# Patient Record
Sex: MALE | Race: WHITE | NOT HISPANIC OR LATINO | Employment: FULL TIME | URBAN - METROPOLITAN AREA
[De-identification: names, ages, dates, MRNs, and addresses within clinical notes are randomized per-mention and may not be internally consistent; named-entity substitution may affect disease eponyms.]

---

## 2017-01-24 ENCOUNTER — ALLSCRIPTS OFFICE VISIT (OUTPATIENT)
Dept: OTHER | Facility: OTHER | Age: 42
End: 2017-01-24

## 2017-01-30 ENCOUNTER — GENERIC CONVERSION - ENCOUNTER (OUTPATIENT)
Dept: OTHER | Facility: OTHER | Age: 42
End: 2017-01-30

## 2017-03-13 ENCOUNTER — ALLSCRIPTS OFFICE VISIT (OUTPATIENT)
Dept: OTHER | Facility: OTHER | Age: 42
End: 2017-03-13

## 2017-03-13 LAB — S PYO AG THROAT QL: NEGATIVE

## 2017-05-11 ENCOUNTER — ALLSCRIPTS OFFICE VISIT (OUTPATIENT)
Dept: OTHER | Facility: OTHER | Age: 42
End: 2017-05-11

## 2017-05-15 ENCOUNTER — ALLSCRIPTS OFFICE VISIT (OUTPATIENT)
Dept: OTHER | Facility: OTHER | Age: 42
End: 2017-05-15

## 2017-05-31 ENCOUNTER — GENERIC CONVERSION - ENCOUNTER (OUTPATIENT)
Dept: OTHER | Facility: OTHER | Age: 42
End: 2017-05-31

## 2017-05-31 LAB
A/G RATIO (HISTORICAL): 1.8 (CALC) (ref 1–2.5)
ALBUMIN SERPL BCP-MCNC: 4.4 G/DL (ref 3.6–5.1)
ALP SERPL-CCNC: 94 U/L (ref 40–115)
ALT SERPL W P-5'-P-CCNC: 12 U/L (ref 9–46)
AST SERPL W P-5'-P-CCNC: 15 U/L (ref 10–40)
BASOPHILS # BLD AUTO: 0.4 %
BASOPHILS # BLD AUTO: 32 CELLS/UL (ref 0–200)
BILIRUB SERPL-MCNC: 0.5 MG/DL (ref 0.2–1.2)
BUN SERPL-MCNC: 19 MG/DL (ref 7–25)
BUN/CREA RATIO (HISTORICAL): ABNORMAL (CALC) (ref 6–22)
CALCIUM (ADJUSTED FOR ALBUMIN) (HISTORICAL): 9.2 MG/DL (CALC) (ref 8.6–10.2)
CALCIUM SERPL-MCNC: 9.2 MG/DL (ref 8.6–10.3)
CHLORIDE SERPL-SCNC: 105 MMOL/L (ref 98–110)
CHOLEST SERPL-MCNC: 218 MG/DL (ref 125–200)
CHOLEST/HDLC SERPL: 5.1 (CALC)
CO2 SERPL-SCNC: 32 MMOL/L (ref 20–31)
CREAT SERPL-MCNC: 0.99 MG/DL (ref 0.6–1.35)
DEPRECATED RDW RBC AUTO: 13.6 % (ref 11–15)
EGFR AFRICAN AMERICAN (HISTORICAL): 108 ML/MIN/1.73M2
EGFR-AMERICAN CALC (HISTORICAL): 94 ML/MIN/1.73M2
EOSINOPHIL # BLD AUTO: 332 CELLS/UL (ref 15–500)
EOSINOPHIL # BLD AUTO: 4.1 %
GAMMA GLOBULIN (HISTORICAL): 2.4 G/DL (CALC) (ref 1.9–3.7)
GLUCOSE (HISTORICAL): 90 MG/DL (ref 65–99)
HCT VFR BLD AUTO: 46.4 % (ref 38.5–50)
HDLC SERPL-MCNC: 43 MG/DL
HGB BLD-MCNC: 15.4 G/DL (ref 13.2–17.1)
LDL CHOLESTEROL (HISTORICAL): 117 MG/DL (CALC)
LYMPHOCYTES # BLD AUTO: 2106 CELLS/UL (ref 850–3900)
LYMPHOCYTES # BLD AUTO: 26 %
MCH RBC QN AUTO: 30.5 PG (ref 27–33)
MCHC RBC AUTO-ENTMCNC: 33.2 G/DL (ref 32–36)
MCV RBC AUTO: 91.8 FL (ref 80–100)
MONOCYTES # BLD AUTO: 575 CELLS/UL (ref 200–950)
MONOCYTES (HISTORICAL): 7.1 %
NEUTROPHILS # BLD AUTO: 5054 CELLS/UL (ref 1500–7800)
NEUTROPHILS # BLD AUTO: 62.4 %
NON-HDL-CHOL (CHOL-HDL) (HISTORICAL): 175 MG/DL (CALC)
PLATELET # BLD AUTO: 208 THOUSAND/UL (ref 140–400)
PMV BLD AUTO: 11.3 FL (ref 7.5–12.5)
POTASSIUM SERPL-SCNC: 4.9 MMOL/L (ref 3.5–5.3)
RBC # BLD AUTO: 5.06 MILLION/UL (ref 4.2–5.8)
SODIUM SERPL-SCNC: 142 MMOL/L (ref 135–146)
TOTAL PROTEIN (HISTORICAL): 6.8 G/DL (ref 6.1–8.1)
TRIGL SERPL-MCNC: 291 MG/DL
WBC # BLD AUTO: 8.1 THOUSAND/UL (ref 3.8–10.8)

## 2017-09-11 ENCOUNTER — CLINICAL SUPPORT (OUTPATIENT)
Dept: OTHER | Facility: OTHER | Age: 42
End: 2017-09-11

## 2017-09-13 ENCOUNTER — GENERIC CONVERSION - ENCOUNTER (OUTPATIENT)
Dept: OTHER | Facility: OTHER | Age: 42
End: 2017-09-13

## 2017-09-20 ENCOUNTER — ALLSCRIPTS OFFICE VISIT (OUTPATIENT)
Dept: RADIOLOGY | Facility: CLINIC | Age: 42
End: 2017-09-20
Payer: COMMERCIAL

## 2017-09-22 ENCOUNTER — GENERIC CONVERSION - ENCOUNTER (OUTPATIENT)
Dept: OTHER | Facility: OTHER | Age: 42
End: 2017-09-22

## 2017-10-11 ENCOUNTER — GENERIC CONVERSION - ENCOUNTER (OUTPATIENT)
Dept: OTHER | Facility: OTHER | Age: 42
End: 2017-10-11

## 2017-10-11 DIAGNOSIS — M54.2 CERVICALGIA: ICD-10-CM

## 2017-10-11 DIAGNOSIS — M48.02 SPINAL STENOSIS OF CERVICAL REGION: ICD-10-CM

## 2017-10-30 ENCOUNTER — APPOINTMENT (OUTPATIENT)
Dept: PHYSICAL THERAPY | Facility: REHABILITATION | Age: 42
End: 2017-10-30
Payer: COMMERCIAL

## 2017-10-30 DIAGNOSIS — M54.2 CERVICALGIA: ICD-10-CM

## 2017-10-30 DIAGNOSIS — M48.02 SPINAL STENOSIS OF CERVICAL REGION: ICD-10-CM

## 2017-10-30 PROCEDURE — G8994 SUB PT/OT GOAL STATUS: HCPCS

## 2017-10-30 PROCEDURE — 97161 PT EVAL LOW COMPLEX 20 MIN: CPT

## 2017-10-30 PROCEDURE — 97140 MANUAL THERAPY 1/> REGIONS: CPT

## 2017-10-30 PROCEDURE — G8993 SUB PT/OT CURRENT STATUS: HCPCS

## 2017-11-02 ENCOUNTER — APPOINTMENT (OUTPATIENT)
Dept: PHYSICAL THERAPY | Facility: OTHER | Age: 42
End: 2017-11-02
Payer: COMMERCIAL

## 2017-11-02 PROCEDURE — 97112 NEUROMUSCULAR REEDUCATION: CPT

## 2017-11-02 PROCEDURE — 97110 THERAPEUTIC EXERCISES: CPT

## 2017-11-02 PROCEDURE — 97140 MANUAL THERAPY 1/> REGIONS: CPT

## 2017-11-06 ENCOUNTER — APPOINTMENT (OUTPATIENT)
Dept: PHYSICAL THERAPY | Facility: REHABILITATION | Age: 42
End: 2017-11-06
Payer: COMMERCIAL

## 2017-11-06 PROCEDURE — 97112 NEUROMUSCULAR REEDUCATION: CPT

## 2017-11-06 PROCEDURE — 97110 THERAPEUTIC EXERCISES: CPT

## 2017-11-06 PROCEDURE — 97140 MANUAL THERAPY 1/> REGIONS: CPT

## 2017-11-06 PROCEDURE — 97012 MECHANICAL TRACTION THERAPY: CPT

## 2017-11-09 ENCOUNTER — APPOINTMENT (OUTPATIENT)
Dept: PHYSICAL THERAPY | Facility: OTHER | Age: 42
End: 2017-11-09
Payer: COMMERCIAL

## 2017-11-09 PROCEDURE — 97140 MANUAL THERAPY 1/> REGIONS: CPT

## 2017-11-09 PROCEDURE — 97110 THERAPEUTIC EXERCISES: CPT

## 2017-11-09 PROCEDURE — 97112 NEUROMUSCULAR REEDUCATION: CPT

## 2017-11-13 ENCOUNTER — APPOINTMENT (OUTPATIENT)
Dept: PHYSICAL THERAPY | Facility: REHABILITATION | Age: 42
End: 2017-11-13
Payer: COMMERCIAL

## 2017-11-13 PROCEDURE — 97140 MANUAL THERAPY 1/> REGIONS: CPT

## 2017-11-16 ENCOUNTER — APPOINTMENT (OUTPATIENT)
Dept: PHYSICAL THERAPY | Facility: OTHER | Age: 42
End: 2017-11-16
Payer: COMMERCIAL

## 2017-11-16 PROCEDURE — 97112 NEUROMUSCULAR REEDUCATION: CPT

## 2017-11-16 PROCEDURE — 97140 MANUAL THERAPY 1/> REGIONS: CPT

## 2017-11-16 PROCEDURE — 97110 THERAPEUTIC EXERCISES: CPT

## 2017-11-20 ENCOUNTER — APPOINTMENT (OUTPATIENT)
Dept: PHYSICAL THERAPY | Facility: OTHER | Age: 42
End: 2017-11-20
Payer: COMMERCIAL

## 2017-11-27 ENCOUNTER — APPOINTMENT (OUTPATIENT)
Dept: PHYSICAL THERAPY | Facility: REHABILITATION | Age: 42
End: 2017-11-27
Payer: COMMERCIAL

## 2017-11-27 PROCEDURE — 97140 MANUAL THERAPY 1/> REGIONS: CPT

## 2017-11-27 PROCEDURE — 97110 THERAPEUTIC EXERCISES: CPT

## 2017-11-30 ENCOUNTER — APPOINTMENT (OUTPATIENT)
Dept: PHYSICAL THERAPY | Facility: OTHER | Age: 42
End: 2017-11-30
Payer: COMMERCIAL

## 2017-11-30 PROCEDURE — 97112 NEUROMUSCULAR REEDUCATION: CPT

## 2017-11-30 PROCEDURE — 97110 THERAPEUTIC EXERCISES: CPT

## 2017-11-30 PROCEDURE — 97140 MANUAL THERAPY 1/> REGIONS: CPT

## 2017-12-11 ENCOUNTER — ALLSCRIPTS OFFICE VISIT (OUTPATIENT)
Dept: OTHER | Facility: OTHER | Age: 42
End: 2017-12-11

## 2017-12-14 ENCOUNTER — APPOINTMENT (OUTPATIENT)
Dept: PHYSICAL THERAPY | Facility: REHABILITATION | Age: 42
End: 2017-12-14
Payer: COMMERCIAL

## 2017-12-18 ENCOUNTER — APPOINTMENT (OUTPATIENT)
Dept: PHYSICAL THERAPY | Facility: REHABILITATION | Age: 42
End: 2017-12-18
Payer: COMMERCIAL

## 2017-12-18 PROCEDURE — 97110 THERAPEUTIC EXERCISES: CPT

## 2017-12-18 PROCEDURE — 97140 MANUAL THERAPY 1/> REGIONS: CPT

## 2017-12-21 ENCOUNTER — APPOINTMENT (OUTPATIENT)
Dept: PHYSICAL THERAPY | Facility: OTHER | Age: 42
End: 2017-12-21
Payer: COMMERCIAL

## 2018-01-08 ENCOUNTER — APPOINTMENT (OUTPATIENT)
Dept: PHYSICAL THERAPY | Facility: REHABILITATION | Age: 43
End: 2018-01-08
Payer: COMMERCIAL

## 2018-01-10 NOTE — MISCELLANEOUS
Message   Recorded as Task   Date: 09/12/2017 10:16 AM, Created By: Jennifer Jackson   Task Name: Call Back   Assigned To: [facility] sched,Team   Regarding Patient: Trevor Morillo, Status: In Progress   Comment:    Clair Patino - 12 Sep 2017 10:16 AM     TASK CREATED  [facility] Call center- Bran Lozano from [facility] called stating that Rx for Storm Reasoner is not covered under patients insurance   also stated that the Pharmacy is non par w/the patients insurance  Stated that there is a generic brand chlorzoxazon that is covered by the patients insurance  Also stated -[phone] would have to fill the Rx   any questions [phone]   Ophelia Spring - 12 Sep 2017 10:20 AM     TASK EDITED  Can you please reorder generic to new pharmacy? Thanks! Zeke Maribel - 12 Sep 2017 3:28 PM     TASK REPLIED TO: Previously Assigned To Zeke López                      Prescription sent to pharmacy   Leslie Bowling - 12 Sep 2017 3:38 PM     TASK EDITED  [facility] on home/cell # for pt to C/B, C/B # and office hrs provided  Clair Patino - 13 Sep 2017 2:06 PM     TASK EDITED  [address] [facility] Call center- patient returned call   c/b [phone]   Christine Corona - 13 Sep 2017 3:16 PM     TASK EDITED  s/w pt, advised of above  Pt verbalized understanding and appreciation  Pt states he does not think he will have a  for his 9:47 pm appt on 9/20  Requesting a sooner appt time  Advised pt, will forward this request to the   Expect a cb  Pt verbalized understanding and appreciation  Erick Wagner - 13 Sep 2017 3:58 PM     TASK IN PROGRESS   Erick Wagner - 13 Sep 2017 3:58 PM     TASK EDITED  Called pt, rescheduled pt for earlier appt in day, now arriving at 8:45am         Active Problems    1  Cervical radiculopathy (723 4) (M54 12)   2  Cervical stenosis of spine (723 0) (M48 02)   3  Cervicalgia (723 1) (M54 2)   4  Chronic bilateral low back pain without sciatica (724 2,338 29) (M54 5,G89 29)   5   Chronic right-sided thoracic back pain (724 1,338 29) (M54 6,G89 29)   6  Cigarette nicotine dependence without complication (352 7) (X15 976)   7  Environmental and seasonal allergies (477 8) (J30 89)   8  GERD without esophagitis (530 81) (K21 9)   9  Hyperlipidemia (272 4) (E78 5)   10  Mild hypercholesterolemia (272 0) (E78 00)   11  Myofascial pain syndrome (729 1) (M79 1)   12  Need for diphtheria-tetanus-pertussis (Tdap) vaccine, adult/adolescent (V06 1) (Z23)   13  Need for influenza vaccination (V04 81) (Z23)   14  Screening cholesterol level (V77 91) (Z13 220)   15  Seasonal allergies (477 9) (J30 2)   16  Sinus headache (784 0) (R51)    Current Meds   1  Chlorzoxazone 500 MG Oral Tablet; TAKE 1 TABLET EVERY 6 TO 8 HOURS AS   NEEDED; Therapy: 74Nep5437 to (Evaluate:96Yjw1752)  Requested for: 27Xho4700; Last   Rx:96Ryl4094 Ordered   2  Diclofenac Potassium 50 MG Oral Tablet; Take 1 tablet bid with meals when necessary; Therapy: 06WDF3512 to (Evaluate:89Yrf5024)  Requested for: 85Qgq5658; Last   Rx:86Fvh0206 Ordered   3  Pantoprazole Sodium 40 MG Oral Tablet Delayed Release; take 1 tablet by mouth once   daily; Therapy: 23Miu9379 to (Evaluate:78Bsy1689)  Requested for: 47OTG6277; Last   Rx:07Jfe4177 Ordered   4  ZyrTEC Allergy 10 MG Oral Tablet; take 1 tablet daily prn; Therapy: 35KHM8078 to (Evaluate:08Cot5149); Last Rx:40Vwm1065 Ordered    Allergies    1   No Known Drug Allergies    Signatures   Electronically signed by : Shahana Duarte, ; Sep 13 2017  3:58PM EST                       (Author)

## 2018-01-10 NOTE — MISCELLANEOUS
Message   Recorded as Task   Date: 07/21/2016 04:25 PM, Created By: Wes Morales   Task Name: Follow Up   Assigned To: [facility],Team   Regarding Patient: Irvin Hutton, Status: In Progress   Comment:    Brad Waite - 21 Jul 2016 4:25 PM     TASK CREATED  Please inform patient of his xray results of thoracic spine  Unremarkable thoracic spine   Danish Jo - 22 Jul 2016 9:24 AM     TASK EDITED  Attempted to contact pt  LMOM informing pt of above  Advised pt to call back with any further questions/concern  Ok to leave message on machine per release of info consent  Active Problems    1  Cervicalgia (723 1) (M54 2)   2  Chronic bilateral low back pain without sciatica (724 2,338 29) (M54 5,G89 29)   3  Chronic right-sided thoracic back pain (724 1,338 29) (M54 6,G89 29)   4  Cigarette nicotine dependence without complication (398 9) (E03 501)   5  GERD without esophagitis (530 81) (K21 9)   6  Mild hypercholesterolemia (272 0) (E78 0)   7  Myofascial pain syndrome (729 1) (M79 1)   8  Screening cholesterol level (V77 91) (Z13 220)   9  Seasonal allergies (477 9) (J30 2)    Current Meds   1  Nasacort Allergy 24HR 55 MCG/ACT Nasal Aerosol; 2 SPRAYS IN EACH NOSTRIL   ONCE DAILY PRN; Therapy: 93Ulu0811 to Recorded   2  Pantoprazole Sodium 40 MG Oral Tablet Delayed Release; take 1 tablet by mouth once   daily; Therapy: 10Gye4573 to (Tuyet Side)  Requested for: 17UZF5715; Last   Rx:67Exj7745 Ordered    Allergies    1   No Known Drug Allergies    Signatures   Electronically signed by : Judy Martinez, ; Jul 22 2016  9:25AM EST                       (Author)

## 2018-01-11 ENCOUNTER — APPOINTMENT (OUTPATIENT)
Dept: PHYSICAL THERAPY | Facility: REHABILITATION | Age: 43
End: 2018-01-11
Payer: COMMERCIAL

## 2018-01-11 PROCEDURE — 97140 MANUAL THERAPY 1/> REGIONS: CPT

## 2018-01-11 PROCEDURE — 97112 NEUROMUSCULAR REEDUCATION: CPT

## 2018-01-11 PROCEDURE — 97110 THERAPEUTIC EXERCISES: CPT

## 2018-01-11 NOTE — RESULT NOTES
Message   Recorded as Task   Date: 09/22/2017 12:49 PM, Created By: System   Task Name: Financial Auth   Assigned To: Bushra Degree   Regarding Patient: Glenda Bain, Status: In Progress   Comment:    System - 22 Sep 2017 12:49 PM     MRI CERVICAL SPINE WO CONTRAST requires Financial Whitney Stai - 28 Sep 5096 2:89 AM     TASK EDITED  Patient request Reji Maximo - 28 Sep 5226 7:80 AM     TASK EDITED  Mailed script to patient   [facility] - 29 Sep 8467 67:72 AM     TASK EDITED  Submitted request through Rad MD - Pending ref # [id number] - Clinicals faxed     Shannon Carcamo - 24 Oct 2017 8:29 AM     TASK EDITED  Addressed in different task  -MRI not approved unless pt has had PT or Chiro  -PT script mailed to pt

## 2018-01-11 NOTE — RESULT NOTES
Verified Results  (Q) CBC (INCLUDES DIFF/PLT) (REFL) [id number] 11:23AM Too Izaguirre   REPORT COMMENT:  REQ ON HOLD  FASTING:YES AN UPDATE OR CORRECTION HAS BEEN MADE TO NAME     Test Name Result Flag Reference   WHITE BLOOD CELL COUNT 8 1 Thousand/uL  3 8-10 8   RED BLOOD CELL COUNT 5 06 Million/uL  4 20-5 80   HEMOGLOBIN 15 4 g/dL  13 2-17 1   HEMATOCRIT 46 4 %  38 5-50 0   MCV 91 8 fL  80 0-100 0   MCH 30 5 pg  27 0-33 0   MCHC 33 2 g/dL  32 0-36 0   RDW 13 6 %  11 0-15 0   PLATELET COUNT 973 Thousand/uL  140-400   MPV 11 3 fL  7 5-12 5   ABSOLUTE NEUTROPHILS 5054 cells/uL  9454-5244   ABSOLUTE LYMPHOCYTES 2106 cells/uL  850-3900   ABSOLUTE MONOCYTES 575 cells/uL  200-950   ABSOLUTE EOSINOPHILS 332 cells/uL     ABSOLUTE BASOPHILS 32 cells/uL  0-200   NEUTROPHILS 62 4 %     LYMPHOCYTES 26 0 %     MONOCYTES 7 1 %     EOSINOPHILS 4 1 %     BASOPHILS 0 4 %       (Q) COMPREHENSIVE METABOLIC PNL W/ADJUSTED CALCIUM 77KTR3400 11:23AM Too Izaguirre     Test Name Result Flag Reference   GLUCOSE 90 mg/dL  65-99   Fasting reference interval   UREA NITROGEN (BUN) 19 mg/dL  7-25   CREATININE 0 99 mg/dL  0 60-1 35   eGFR NON-AFR   AMERICAN 94 mL/min/1 73m2  > OR = 60   eGFR AFRICAN AMERICAN 108 mL/min/1 73m2  > OR = 60   BUN/CREATININE RATIO   3-96   NOT APPLICABLE (calc)   SODIUM 142 mmol/L  135-146   POTASSIUM 4 9 mmol/L  3 5-5 3   CHLORIDE 105 mmol/L     CARBON DIOXIDE 32 mmol/L H 20-31   CALCIUM 9 2 mg/dL  8 6-10 3   CALCIUM (ADJUSTED FOR$ALBUMIN) 9 2 mg/dL (calc)  8 6-10 2   PROTEIN, TOTAL 6 8 g/dL  6 1-8 1   ALBUMIN 4 4 g/dL  3 6-5 1   GLOBULIN 2 4 g/dL (calc)  1 9-3 7   ALBUMIN/GLOBULIN RATIO 1 8 (calc)  1 0-2 5   BILIRUBIN, TOTAL 0 5 mg/dL  0 2-1 2   ALKALINE PHOSPHATASE 94 U/L     AST 15 U/L  10-40   ALT 12 U/L  9-46     (1) LIPID PANEL, FASTING 61IWG8294 11:23AM Too Izaguirre     Test Name Result Flag Reference   CHOLESTEROL, TOTAL 218 mg/dL H 125-200   HDL CHOLESTEROL 43 mg/dL  > OR = 40   TRIGLICERIDES 882 mg/dL H <150   LDL-CHOLESTEROL 117 mg/dL (calc)  <130   Desirable range <100 mg/dL for patients with CHD or  diabetes and <70 mg/dL for diabetic patients with  known heart disease  CHOL/HDLC RATIO 5 1 (calc) H < OR = 5 0   NON HDL CHOLESTEROL 175 mg/dL (calc) H    Target for non-HDL cholesterol is 30 mg/dL higher than   LDL cholesterol target  Plan  Hyperlipidemia    · (1) LIPID PANEL, FASTING; Status:Active;  Requested for:27Nov2017;

## 2018-01-12 VITALS
WEIGHT: 171.13 LBS | HEIGHT: 69 IN | TEMPERATURE: 97.5 F | HEART RATE: 78 BPM | RESPIRATION RATE: 16 BRPM | BODY MASS INDEX: 25.35 KG/M2 | SYSTOLIC BLOOD PRESSURE: 122 MMHG | DIASTOLIC BLOOD PRESSURE: 76 MMHG

## 2018-01-12 NOTE — PROGRESS NOTES
Assessment    1  Myofascial pain syndrome (729 1) (M79 1)   2  Cervicalgia (723 1) (M54 2)   3  Cervical stenosis of spine (723 0) (M48 02)   4  Cervical radiculopathy (723 4) (M54 12)    Plan  Cervical radiculopathy    · Diclofenac Potassium 50 MG Oral Tablet; Take 1 tablet bid with meals when  necessary   Rx By: Keily Gordon; Dispense: 30 Days ; #:60 Tablet; Refill: 2; For: Cervical radiculopathy; KELVIN = N; Verified Transmission to [facility]ARMACY #[id number] Last Updated By: System, [employer]; 9/11/2017 2:33:00 PM  Cervical radiculopathy, Cervical stenosis of spine, Cervicalgia    · Injection Diagnostic/Therapeutic Not Neurolytic Epidural/Subarachnoid Cervical or  Thoracic; Status:Complete;   Done: 96OAX6443 02:24PM   Perform:[facility]; Order Comments:SHEILA with Dr Jennifer Hernandez; Due:64Vkc7278; Ordered; For:Cervical radiculopathy, Cervical stenosis of spine, Cervicalgia; Ordered By:Mariaa Adame;   · 1 Shelly Padilla MD, Yary Pinzon  (Neurosurgery) Co-Management  *please evaluate this  patient for neck pain with left upper extremity radiculopathy  Status: Canceled   Ordered; For: Cervical radiculopathy, Cervical stenosis of spine, Cervicalgia; Ordered By: Keily Gordon Performed:  Due: 10NSA8696  Care Summary provided  : Yes  Cervical stenosis of spine, Cervicalgia    · 1 - Juan Francisco Moreira MD, [facility] CIN[facility] Neurosurgery Co-Management  *Please evaluate this patient for  possible surgery neck pain with left upper extremity radiculopathy  Status: Active   Requested for: 29Wwt3281   Ordered; For: Cervical stenosis of spine, Cervicalgia; Ordered By: Keily Gordon Performed:  Due: 85PPK9842  Care Summary provided  : Yes      This is a 50-year-old male patient presents the office for follow-up visit today  The patient is currently being treated for myofascial pain syndrome, cervicalgia, cervical radiculopathy and cervical stenosis of the spine   The patient is status post cervical epidural steroid injection #2 on 12/7/17 by Dr Jennifer Hernandez and reports that he had approximately 7 months of pain relief from this injection  The patient reports that the first injection was longer lasting than the second and states that he would like to repeat this injection at this time, however, he ended up having to pay $1300 out of pocket for his second injection because his insurance did not cover it  The patient reports that he would also like a surgical consultation at this time to be evaluated for any kind of surgical intervention may relieve his cervicalgia and cervical radiculopathy in the left upper extremity  The patient's radicular symptoms into his left upper extremity present in no specific dermatomal fashion  He also reports that his neck pain will radiate into his bilateral trapezii muscles  The patient reports that he was taking diclofenac 50 mg twice a day when necessary for his pain symptoms, however, he has not needed this medication because he had such significant pain relief from this injection  The patient reports that he did use the Lorzone 750 mg 3 times a day when necessary samples for his myofascial pain and muscle spasm and reports that this medication was extremely effective in reducing his myofascial pain  The plan for this patient is as follows:  1  I will order this patient a repeat cervical epidural steroid injection to address the inflammatory component of this patient's pain symptoms  I encouraged this patient to contact his insurance company with the procedure codes to ensure that this will be covered by his insurance plan  Complete risks and benefits including bleeding, infection, tissue reaction, nerve injury and allergic reaction were discussed  The approach was demonstrated using models and literature was provided  Verbal and written consent was obtained      2  I will renew this patient's diclofenac 50 mg twice a day when necessary for his pain symptoms and encourage this patient take this medication with food and to avoid all other NSAIDs on this medication  3  I will order this patient Lorzone 750 mg 3 times a day when necessary for his myofascial pain and muscle spasm  I did provide this patient with some samples while he was in the office today as well  4  I will provide this patient with a neurosurgery referral to speak with Dr Benigno Redding regarding his cervicalgia and left upper extremity cervical radiculopathy  5  The patient should continue with his home exercise program     6  I will follow-up with this patient in 8 weeks  Discussion/Summary  Patient is able to Self-Care  The treatment plan was reviewed with the patient/guardian  The patient/guardian understands and agrees with the treatment plan   The patient was counseled regarding instructions for management, risk factor reductions, prognosis, patient and family education, impressions, risks and benefits of treatment options and importance of compliance with treatment  total time of encounter was 25 minutes  Chief Complaint    1  Back Pain  Neck and left arm pain  Right shoulder pain  History of Present Illness  This is a 65-year-old male patient presents the office for follow-up visit today  The patient is currently being treated for myofascial pain syndrome, cervicalgia, cervical radiculopathy and cervical stenosis of the spine  The patient is status post cervical epidural steroid injection #2 on 12/7/17 by Dr Deb Lemos and reports that he had approximately 7 months of pain relief from this injection  The patient reports that the first injection was longer lasting than the second and states that he would like to repeat this injection at this time, however, he ended up having to pay $1300 out of pocket for his second injection because his insurance did not cover it   The patient reports that he would also like a surgical consultation at this time to be evaluated for any kind of surgical intervention may relieve his cervicalgia and cervical radiculopathy in the left upper extremity  The patient's radicular symptoms into his left upper extremity present in no specific dermatomal fashion  He also reports that his neck pain will radiate into his bilateral trapezii muscles  The patient reports that he was taking diclofenac 50 mg twice a day when necessary for his pain symptoms, however, he has not needed this medication because he had such significant pain relief from this injection  The patient reports that he did use the Lorzone 750 mg 3 times a day when necessary samples for his myofascial pain and muscle spasm and reports that this medication was extremely effective in reducing his myofascial pain  The patient reports that his pain is worse since his previous visit and rates his pain a 4-5/10 on the numerical pain rating scale  The patient reports that his pain when he is done working at his job at [employer] is a 10/10 on the numerical pain rating scale  The patient reports that his pain is worse in the evening and night and that his pain is constant and describes the pain as dull aching and sharp-like in nature  The patient reports that the amount of pain relief that he is obtaining now with this current medication regimen treatment plan is not enough to make a real difference in his life  The patient reports that 20% of his pain is being relieved with his current medication regimen treatment plan  He denies any bowel or bladder incontinence or saddle anesthesia  I have personally reviewed and/or updated the patient's past medical history, past surgical history, family history, social history, allergies, and vital signs today  Other than as stated above, the patient denies any interval changes in medications, medical condition, mental condition, symptoms, or allergies since last office visit  Gabrielle Cheema presents with complaints of constant episodes of bilateral upper back pain, described as sharp, dull and aching, radiating to the bilateral shoulder        Review of Systems    Constitutional: no fever, no recent weight gain and no recent weight loss  Eyes: no double vision and no blurry vision  Cardiovascular: no chest pain, no palpitations and no lower extremity edema  Respiratory: no complaints of shortness of breath and no wheezing  Musculoskeletal: pain in extremity , but no difficulty walking, no muscle weakness, no joint stiffness, no joint swelling, no limb swelling` and no decreased range of motion  Neurological: no dizziness, no difficulty swallowing, no memory loss, no loss of consciousness and no seizures  Gastrointestinal: no nausea, no vomiting, no constipation and no diarrhea  Genitourinary: no difficulty initiating urine stream, no genital pain and no frequent urination  Integumentary: no complaints of skin rash  Psychiatric: no depression  Endocrine: no excessive thirst, no adrenal disease, no hypothyroidism and no hyperthyroidism  Hematologic/Lymphatic: no tendency for easy bruising and no tendency for easy bleeding  ROS reviewed  Active Problems    1  Cervical radiculopathy (723 4) (M54 12)   2  Cervical stenosis of spine (723 0) (M48 02)   3  Cervicalgia (723 1) (M54 2)   4  Chronic bilateral low back pain without sciatica (724 2,338 29) (M54 5,G89 29)   5  Chronic right-sided thoracic back pain (724 1,338 29) (M54 6,G89 29)   6  Cigarette nicotine dependence without complication (554 9) (J04 846)   7  Environmental and seasonal allergies (477 8) (J30 89)   8  GERD without esophagitis (530 81) (K21 9)   9  Hyperlipidemia (272 4) (E78 5)   10  Mild hypercholesterolemia (272 0) (E78 00)   11  Myofascial pain syndrome (729 1) (M79 1)   12  Need for diphtheria-tetanus-pertussis (Tdap) vaccine, adult/adolescent (V06 1) (Z23)   13  Need for influenza vaccination (V04 81) (Z23)   14  Screening cholesterol level (V77 91) (Z13 220)   15  Seasonal allergies (477 9) (J30 2)   16  Sinus headache (784 0) (R51)    Past Medical History    1  History of Acute pharyngitis, unspecified etiology (462) (J02 9)   2  History of Acute URI (465 9) (J06 9)   3  History of bacterial conjunctivitis (V12 49) (Z86 69)   4  History of eczema (V13 3) (Z87 2)   5  History of migraine (V12 49) (Z86 69)   6  History of streptococcal pharyngitis (V12 09) (Z87 09)    The active problems and past medical history were reviewed and updated today  Surgical History    1  History of Appendectomy    The surgical history was reviewed and updated today  Family History  Mother    1  No pertinent family history    The family history was reviewed and updated today  Social History    · Current every day smoker (305 1) (F17 200)   · No drug use   · Occasional alcohol use  The social history was reviewed and updated today  The social history was reviewed and is unchanged  Current Meds   1  Pantoprazole Sodium 40 MG Oral Tablet Delayed Release; take 1 tablet by mouth once   daily; Therapy: 67Wtp5682 to (Evaluate:35Hlb9479)  Requested for: 91VYX8406; Last   Rx:28Rsi1618 Ordered   2  ZyrTEC Allergy 10 MG Oral Tablet; take 1 tablet daily prn; Therapy: 59RII1805 to (Evaluate:40Tmh9565); Last Rx:14Txu7322 Ordered    The medication list was reviewed and updated today  Allergies    1  No Known Drug Allergies    Vitals  Vital Signs    Recorded: 11Sep2017 02:01PM   Temperature 98 6 F   Heart Rate 71   Systolic 663   Diastolic 70   Weight 216 lb    BMI Calculated 24 87   BSA Calculated 1 9   Pain Scale 5     Physical Exam    Constitutional   General appearance: Well developed, well nourished, alert, in no distress, non-toxic and no overt pain behavior  Eyes   Sclera: anicteric   HEENT   Hearing grossly intact  Neck   Neck: Supple, symmetric, trachea midline, no masses  Pulmonary   Respiratory effort: Even and unlabored  Cardiovascular   Examination of extremities: No edema or pitting edema present  Abdomen   Abdomen: Soft, non-tender, non-distended  Skin   Skin and subcutaneous tissue: Normal without rashes or lesions, well hydrated  Psychiatric   Mood and affect: Mood and affect appropriate  Neurologic   Cranial nerves: Cranial nerves II-XII grossly intact  the muscle tone was normal   Musculoskeletal   Gait and station: Normal     Cervical Spine examination demonstrates  5/5 upper extremity strength in all muscle groups bilaterally  Significant tenderness and muscle spasm noted in bilateral paraspinal muscles as well as bilateral trapezii  Positive Spurling's maneuver to the left  Results/Data  Results Free Text Form [facility]:   Results    I personally reviewed the films/images in the office today  Attending Note    Scribe Attestation Fletcher BURK CRNP am acting as a scribe in the presence of the attending physician while the attending physician examines the patient  Physician Attestation:   Alban Primrose O  personally performed the services described in this documentation as scribed in my presence, and it is both accurate and complete  Future Appointments    Date/Time Provider Specialty Site   05/15/2018 10:00 AM JOSIE Christensen   [facility]   09/20/2017 02:00 PM Schuyler Sutton DO [facility] [place] [facility]     Signatures   Electronically signed by : Cindy Duarte DO; Sep 11 2017  4:45PM EST                       (Author)

## 2018-01-12 NOTE — RESULT NOTES
Message   Recorded as Task   Date: 10/10/2017 03:46 PM, Created By: Jamie Fragoso   Task Name: Call Back   Assigned To: [facility],Team   Regarding Patient: Georgina Dove, Status: Active   Comment:    Jamie Fragoso - 10 Oct 2017 3:46 PM     TASK CREATED  Pt was refered by dr Daniel Butler to see Dr Jaquelin Pollard office but they told patient that he will need a recent MRI of the cervical with in the past 6 months  Patients ins will not approve the mri unless he hashar PT or Chiropractotic- wants to know what he should do next # [phone]   Ophelia Spring - 11 Oct 2017 8:29 AM     TASK EDITED  Do you think you could order PT for the pt? LB to advise  Thanks   Mariaa Adame - 11 Oct 2017 12:06 PM     TASK REPLIED TO: Previously Assigned To Paulina Dhillon  PT is ordered  Thanks  Ophelia Spring - 11 Oct 2017 1:15 PM     TASK EDITED  S/w the pt  and he is aware and script put into mail          Signatures   Electronically signed by : Melly Mortensen, ; Oct 11 2017  1:16PM EST                       (Author)

## 2018-01-13 VITALS
TEMPERATURE: 96.6 F | SYSTOLIC BLOOD PRESSURE: 118 MMHG | DIASTOLIC BLOOD PRESSURE: 80 MMHG | HEART RATE: 60 BPM | HEIGHT: 69 IN | BODY MASS INDEX: 25.62 KG/M2 | WEIGHT: 173 LBS | RESPIRATION RATE: 16 BRPM

## 2018-01-13 VITALS
WEIGHT: 166 LBS | TEMPERATURE: 98.6 F | HEART RATE: 71 BPM | SYSTOLIC BLOOD PRESSURE: 110 MMHG | BODY MASS INDEX: 24.87 KG/M2 | DIASTOLIC BLOOD PRESSURE: 70 MMHG

## 2018-01-13 VITALS
TEMPERATURE: 97.4 F | SYSTOLIC BLOOD PRESSURE: 120 MMHG | HEART RATE: 68 BPM | WEIGHT: 166.13 LBS | BODY MASS INDEX: 24.61 KG/M2 | HEIGHT: 69 IN | RESPIRATION RATE: 16 BRPM | DIASTOLIC BLOOD PRESSURE: 80 MMHG

## 2018-01-13 VITALS
BODY MASS INDEX: 24.89 KG/M2 | SYSTOLIC BLOOD PRESSURE: 132 MMHG | RESPIRATION RATE: 16 BRPM | WEIGHT: 168.03 LBS | TEMPERATURE: 97.6 F | HEIGHT: 69 IN | DIASTOLIC BLOOD PRESSURE: 88 MMHG | HEART RATE: 64 BPM

## 2018-01-13 NOTE — RESULT NOTES
Message   Recorded as Task   Date: 12/14/2016 07:46 AM, Created By: Hipolito Herbert   Task Name: Follow Up   Assigned To: [facility],Team   Regarding Patient: Doris Diego, Status: Active   CommentHumera Koroma - 14 Dec 4451 9:22 AM     TASK CREATED  S/P SHEILA ON 12/7/2016 W/ DR Avi Blevins - F/U SCHEDULED 1/30/17 W/ DR Chayo Chao - 14 Dec 2778 0:89 AM     TASK IN PROGRESS   Galina Navarrete - 14 Dec 1802 4:76 AM     TASK EDITED  1st Attempt     LM ON vm to CB   Hipolito Herbert - 16 Dec 7180 9:13 AM     TASK EDITED  Pt reports 75% relief - he is doing well  Pt will f/u on 1/30/17, he will call closer to the appt time if he does not need f/u     Catarino Gann - 16 Dec 2016 4:46 PM     TASK REPLIED TO: Previously Assigned To [address] [place] [address]   Electronically signed by : Carlos Tejada, ; Dec 19 2016  8:59AM EST                       (Author)

## 2018-01-14 NOTE — PROGRESS NOTES
Chief Complaint  Tdap administered L deltoid lot# [id number] Exp 11/10/2018      Active Problems    1  Acute pharyngitis, unspecified etiology (462) (J02 9)   2  Cervicalgia (723 1) (M54 2)   3  Chronic bilateral low back pain without sciatica (724 2,338 29) (M54 5,G89 29)   4  Chronic right-sided thoracic back pain (724 1,338 29) (M54 6,G89 29)   5  Cigarette nicotine dependence without complication (631 9) (G44 206)   6  GERD without esophagitis (530 81) (K21 9)   7  Mild hypercholesterolemia (272 0) (E78 0)   8  Myofascial pain syndrome (729 1) (M79 1)   9  Need for diphtheria-tetanus-pertussis (Tdap) vaccine, adult/adolescent (V06 1) (Z23)   10  Screening cholesterol level (V77 91) (Z13 220)   11  Seasonal allergies (477 9) (J30 2)    Current Meds   1  Amoxicillin 500 MG Oral Capsule; TAKE 1 CAPSULE 3 TIMES DAILY; Therapy: 11Xng8923 to (Last Rx:46Vxm6570)  Requested for: 05Ble2897 Ordered   2  Nasacort Allergy 24HR 55 MCG/ACT Nasal Aerosol; 2 SPRAYS IN EACH NOSTRIL   ONCE DAILY PRN; Therapy: 74Ypo0411 to Recorded   3  Pantoprazole Sodium 40 MG Oral Tablet Delayed Release; take 1 tablet by mouth once   daily; Therapy: 87Szn2132 to (Oneita Calamity)  Requested for: 33QMI6279; Last   Rx:28Lky2224 Ordered    Allergies    1  No Known Drug Allergies    Plan  Need for diphtheria-tetanus-pertussis (Tdap) vaccine, adult/adolescent    · Tdap (Adacel)    Future Appointments    Date/Time Provider Specialty Site   11/07/2016 09:00 AM JOSIE Jalloh   Family Medicine [address] [place] [address] Drive     Signatures   Electronically signed by : JOSIE Ruvalcaba ; Sep  1 2016  5:18PM EST                       (Author)

## 2018-01-15 ENCOUNTER — APPOINTMENT (OUTPATIENT)
Dept: PHYSICAL THERAPY | Facility: REHABILITATION | Age: 43
End: 2018-01-15
Payer: COMMERCIAL

## 2018-01-15 PROCEDURE — 97140 MANUAL THERAPY 1/> REGIONS: CPT

## 2018-01-15 PROCEDURE — 97110 THERAPEUTIC EXERCISES: CPT

## 2018-01-15 NOTE — MISCELLANEOUS
Message   Recorded as Task   Date: 09/21/2017 03:17 PM, Created By: Frida Miller   Task Name: Miscellaneous   Assigned To: Frida Miller   Regarding Patient: Jeremiah Hernández, Status: In Progress   Comment:    Sonali Shaw - 21 Sep 2017 3:17 PM     TASK CREATED  Caller: Self; Other; [phone] (Home)  Pt called to ask up if he can have and MRI--called Dr Garrison [facility] office and they are req he have one since he hasn't had on in the past 6 months  Carlos Frederickpiper - 22 Sep 2017 12:50 PM     TASK REPLIED TO: Previously Assigned To Ranulfo King                      MRI of cervical spine printed at [facility] for patient to    Frida Miller - 22 Sep 2017 1:26 PM     TASK EDITED  s/w patient once MRI is authorized he is going to either come in for the script or call us and we will fax it to where ever he is having his MRI done  Active Problems    1  Cervical radiculopathy (723 4) (M54 12)   2  Cervical stenosis of spine (723 0) (M48 02)   3  Cervicalgia (723 1) (M54 2)   4  Chronic bilateral low back pain without sciatica (724 2,338 29) (M54 5,G89 29)   5  Chronic right-sided thoracic back pain (724 1,338 29) (M54 6,G89 29)   6  Cigarette nicotine dependence without complication (721 6) (S70 426)   7  Environmental and seasonal allergies (477 8) (J30 89)   8  GERD without esophagitis (530 81) (K21 9)   9  Hyperlipidemia (272 4) (E78 5)   10  Mild hypercholesterolemia (272 0) (E78 00)   11  Myofascial pain syndrome (729 1) (M79 1)   12  Need for diphtheria-tetanus-pertussis (Tdap) vaccine, adult/adolescent (V06 1) (Z23)   13  Need for influenza vaccination (V04 81) (Z23)   14  Screening cholesterol level (V77 91) (Z13 220)   15  Seasonal allergies (477 9) (J30 2)   16  Sinus headache (784 0) (R51)    Current Meds   1  Chlorzoxazone 500 MG Oral Tablet; TAKE 1 TABLET EVERY 6 TO 8 HOURS AS   NEEDED; Therapy: 25Czm3419 to (Evaluate:12Oct2017)  Requested for: 12Sep2017; Last   Rx:12Sep2017 Ordered   2  Diclofenac Potassium 50 MG Oral Tablet; Take 1 tablet bid with meals when necessary; Therapy: 39SOQ1829 to (Evaluate:97Anr7313)  Requested for: 38Dqu2401; Last   Rx:54Ask4954 Ordered   3  Pantoprazole Sodium 40 MG Oral Tablet Delayed Release; take 1 tablet by mouth once   daily; Therapy: 52Sam5153 to (Evaluate:11Mpf2823)  Requested for: 22GED8964; Last   Rx:28Daf5149 Ordered   4  ZyrTEC Allergy 10 MG Oral Tablet; take 1 tablet daily prn; Therapy: 92IZG1655 to (Evaluate:79Gpq9910); Last Rx:79Wfe1352 Ordered    Allergies    1   No Known Drug Allergies    Signatures   Electronically signed by : Linda Marques, ; Sep 22 2017  1:27PM EST                       (Author)

## 2018-01-15 NOTE — MISCELLANEOUS
Signatures   Electronically signed by : Sierra Rogers MD; Jun 6 2016 10:12AM EST                       (Author)

## 2018-01-16 NOTE — RESULT NOTES
Message   Call patiet  Throat culture is positive for Strep  Recommend to complete antibiotic therapy with  Amoxil for 10 days total      Verified Results  (1) THROAT CULTURE (CULTURE, UPPER RESPIRATORY) 33KZT6187 12:00AM [facility] [place]     Test Name Result Flag Reference   CULTURE, THROAT  A    CULTURE, THROAT         MICRO NUMBER:      [id number]    TEST STATUS:       FINAL    SPECIMEN SOURCE:   THROAT    SPECIMEN QUALITY:  ADEQUATE    RESULT:            Light growth of Group A Streptococcus                       Beta-hemolytic Streptococci are predictably                       susceptible to penicillin and other beta-lactams  Susceptibility testing not routinely performed

## 2018-01-18 NOTE — MISCELLANEOUS
Signatures   Electronically signed by : Joseph Henriquez DO; Jan 30 2017 12:37PM EST                       (Author)

## 2018-01-23 NOTE — PROGRESS NOTES
Chief Complaint  Patient here for influenza vaccine  Active Problems    1  Cervical radiculopathy (723 4) (M54 12)   2  Cervical stenosis of spine (723 0) (M48 02)   3  Cervicalgia (723 1) (M54 2)   4  Chronic bilateral low back pain without sciatica (724 2,338 29) (M54 5,G89 29)   5  Chronic right-sided thoracic back pain (724 1,338 29) (M54 6,G89 29)   6  Cigarette nicotine dependence without complication (056 3) (V98 820)   7  Environmental and seasonal allergies (477 8) (J30 89)   8  GERD without esophagitis (530 81) (K21 9)   9  Hyperlipidemia (272 4) (E78 5)   10  Mild hypercholesterolemia (272 0) (E78 00)   11  Myofascial pain syndrome (729 1) (M79 1)   12  Need for diphtheria-tetanus-pertussis (Tdap) vaccine, adult/adolescent (V06 1) (Z23)   13  Need for influenza vaccination (V04 81) (Z23)   14  Screening cholesterol level (V77 91) (Z13 220)   15  Seasonal allergies (477 9) (J30 2)   16  Sinus headache (784 0) (R51)    Current Meds   1  Chlorzoxazone 500 MG Oral Tablet; TAKE 1 TABLET EVERY 6 TO 8 HOURS AS   NEEDED; Therapy: 40Paq9631 to (Evaluate:12Efl9633)  Requested for: 08Uhv1155; Last   Rx:02Phm5616 Ordered   2  Diclofenac Potassium 50 MG Oral Tablet; Take 1 tablet bid with meals when necessary; Therapy: 11IJS0688 to (Evaluate:57Vwz2911)  Requested for: 46Vhe4641; Last   Rx:26Cds6124 Ordered   3  Pantoprazole Sodium 40 MG Oral Tablet Delayed Release; take 1 tablet by mouth once   daily; Therapy: 97Qqk0264 to (Evaluate:78Pfn2991)  Requested for: 98MWN0202; Last   Rx:77Lll1181 Ordered   4  ZyrTEC Allergy 10 MG Oral Tablet; take 1 tablet daily prn; Therapy: 26ZMR3685 to (Evaluate:28Ygh8882); Last Rx:38Mmg8584 Ordered    Allergies    1  No Known Drug Allergies    Plan  Need for influenza vaccination    · Fluzone Quadrivalent 0 5 ML Intramuscular Suspension Prefilled Syringe    Future Appointments    Date/Time Provider Specialty Site   05/15/2018 10:00 AM JOSIE Oliver   Family Medicine [address] [place] [address]     Signatures   Electronically signed by : JOSIE Chen ; Dec 11 2017 12:27PM EST                       (Author)

## 2018-01-29 ENCOUNTER — OFFICE VISIT (OUTPATIENT)
Dept: PHYSICAL THERAPY | Facility: REHABILITATION | Age: 43
End: 2018-01-29
Payer: COMMERCIAL

## 2018-01-29 DIAGNOSIS — M54.12 CERVICAL RADICULOPATHY: Primary | ICD-10-CM

## 2018-01-29 DIAGNOSIS — M54.16 LUMBAR RADICULOPATHY: ICD-10-CM

## 2018-01-29 PROCEDURE — 97140 MANUAL THERAPY 1/> REGIONS: CPT | Performed by: PHYSICAL THERAPIST

## 2018-01-29 PROCEDURE — 97112 NEUROMUSCULAR REEDUCATION: CPT | Performed by: PHYSICAL THERAPIST

## 2018-01-29 PROCEDURE — 97110 THERAPEUTIC EXERCISES: CPT | Performed by: PHYSICAL THERAPIST

## 2018-01-29 NOTE — PROGRESS NOTES
Daily Note     Today's date: 2018  Patient name: Konrad Smith  : 1975  MRN: [id number]  Referring provider: Florecita Daly MD  Dx:   Encounter Diagnoses   Name Primary?  Cervical radiculopathy Yes    Lumbar radiculopathy                   Subjective: good tolerance to progression patient continues to be most challenged with work  He will contact MD about follow up, no N/T continues seems to be more muscular in nature recently  Objective: See treatment diary below  Precautions: none    Daily Treatment Diary  1 on1 PT and PTA entire visit   Manual  1 29 18            ISTM UT and t-s paraspinals   S/L gr 5 mobilization L-S and prone T-S grade 5 ,mobilization/  10min                                                                    Exercise Diary   18            Prayer stretch  10''10            Bridges  20            UBE  3/3            Barrel hugs  5''x10            UT stretch  10''x10            T-S ext chair  5''x10            Foam roll protocol  punches and barhugs 1min each             pball YTI  15each                                                                                                                                                                             Modalities                                                                 Assessment: Tolerated treatment well  Patient demonstrated fatigue post treatment      Plan: Progress note during next visit

## 2018-02-16 RX ORDER — DICLOFENAC POTASSIUM 50 MG/1
1 TABLET, FILM COATED ORAL
COMMUNITY
Start: 2016-11-21 | End: 2018-02-19

## 2018-02-16 RX ORDER — CHLORZOXAZONE 500 MG/1
1 TABLET ORAL
COMMUNITY
Start: 2017-09-12 | End: 2018-02-19

## 2018-02-16 RX ORDER — PANTOPRAZOLE SODIUM 40 MG/1
1 TABLET, DELAYED RELEASE ORAL DAILY
COMMUNITY
Start: 2016-04-18 | End: 2018-03-06 | Stop reason: SDUPTHER

## 2018-02-19 ENCOUNTER — CLINICAL SUPPORT (OUTPATIENT)
Dept: PAIN MEDICINE | Facility: CLINIC | Age: 43
End: 2018-02-19
Payer: COMMERCIAL

## 2018-02-19 VITALS
HEART RATE: 66 BPM | HEIGHT: 68 IN | TEMPERATURE: 98.3 F | WEIGHT: 172.4 LBS | DIASTOLIC BLOOD PRESSURE: 73 MMHG | BODY MASS INDEX: 26.13 KG/M2 | SYSTOLIC BLOOD PRESSURE: 115 MMHG

## 2018-02-19 DIAGNOSIS — M47.812 SPONDYLOSIS OF CERVICAL REGION WITHOUT MYELOPATHY OR RADICULOPATHY: ICD-10-CM

## 2018-02-19 DIAGNOSIS — M48.02 CERVICAL STENOSIS OF SPINE: ICD-10-CM

## 2018-02-19 DIAGNOSIS — M54.2 NECK PAIN: ICD-10-CM

## 2018-02-19 DIAGNOSIS — M79.18 MYOFASCIAL PAIN: ICD-10-CM

## 2018-02-19 DIAGNOSIS — M54.12 CERVICAL RADICULOPATHY: Primary | ICD-10-CM

## 2018-02-19 PROCEDURE — 99214 OFFICE O/P EST MOD 30 MIN: CPT | Performed by: ANESTHESIOLOGY

## 2018-02-19 RX ORDER — DICLOFENAC SODIUM 75 MG/1
75 TABLET, DELAYED RELEASE ORAL 2 TIMES DAILY
Qty: 60 TABLET | Refills: 2 | Status: SHIPPED | OUTPATIENT
Start: 2018-02-19 | End: 2018-04-16

## 2018-02-19 NOTE — PROGRESS NOTES
Assessment:  1  Cervical radiculopathy    2  Spondylosis of cervical region without myelopathy or radiculopathy    3  Cervical stenosis of spine    4  Neck pain    5  Myofascial pain        Plan:  43-year-old male returning for follow-up of neck pain and cervical radiculopathy secondary to cervical stenosis  The patient did have cervical epidural steroid injection x3  The 1st injection was very helpful, however the 2nd 2 injections were not  The patient was referred to Neurosurgery who required an updated MRI of his cervical spine, however his insurance will call Iron him to complete a course of physical therapy which he has been participating in since November without any improvement in his symptoms  He was taking diclofenac 50 mg b i d  p r n  and chlorzoxazone p r n  without any relief  He has discontinued the use of these  He prefers to avoid sedating medications  1   I will order an MRI of his cervical spine  2  He will schedule a consult with Neurosurgery after this is completed  3  I will try and increase in his diclofenac to 75 mg b i d  p r n   4   Will hold off on repeat cervical epidural since the last 2 or an effective  5  I will follow up the patient in 2 months    My impressions and treatment recommendations were discussed in detail with the patient who verbalized understanding and had no further questions  Discharge instructions were provided  I personally saw and examined the patient and I agree with the above discussed plan of care  No orders of the defined types were placed in this encounter      New Medications Ordered This Visit   Medications    pantoprazole (PROTONIX) 40 mg tablet     Sig: Take 1 tablet by mouth daily    Cetirizine HCl (ZYRTEC ALLERGY) 10 MG CAPS     Sig: Take 1 tablet by mouth daily as needed       History of Present Illness:    Jimmy Fuchs is a 36 y o  male with a history of cervical degenerative disc disease and spondylosis, stenosis, and radiculopathy returning for follow-up  The patient is status post cervical epidural steroid injection x3  The 1st injection was very foul full, however this 2nd to injections or not  He has been taking diclofenac and chlorzoxazone, however they were ineffective so he discontinued the use of this  A neurosurgery consult was ordered at last visit who required an updated MRI of the cervical spine, however his insurance required him to complete a course of physical therapy 1st   He has been participating in physical therapy since November without any relief in his symptoms  He denies any bladder or bowel incontinence or saddle anesthesia  The patient states that the pain mostly shoots into his trapezius and shoulder regions and into the scapular regions bilaterally  The patient rates pain as 7/10 on the pain is worse in the evening and at night  The pain is constant and described as dull, aching, and cramping  The pain is worse with bending and twisting his neck, lifting, and coughing/sneezing  The pain is alleviated with relaxation and lying down  I have personally reviewed and/or updated the patient's past medical history, past surgical history, family history, social history, current medications, allergies, and vital signs today  Other than as stated above, the patient denies any interval changes in medications, medical condition, mental condition, symptoms, or allergies since the last office visit  Review of Systems:    Review of Systems   Respiratory: Negative for shortness of breath  Cardiovascular: Negative for chest pain  Gastrointestinal: Negative for constipation, diarrhea, nausea and vomiting  Musculoskeletal: Negative for arthralgias, gait problem, joint swelling and myalgias  Skin: Negative for rash  Neurological: Negative for dizziness, seizures and weakness  All other systems reviewed and are negative  There is no problem list on file for this patient        Past Medical History:   Diagnosis Date    Eczema     Migraine     Streptococcal pharyngitis        Past Surgical History:   Procedure Laterality Date    APPENDECTOMY         No family history on file  Social History     Occupational History    Not on file  Social History Main Topics    Smoking status: Current Every Day Smoker    Smokeless tobacco: Not on file    Alcohol use Yes    Drug use: Unknown    Sexual activity: Not on file       No current outpatient prescriptions on file prior to visit  No current facility-administered medications on file prior to visit  No Known Allergies    Physical Exam:    /73   Pulse 66   Temp 98 3 °F (36 8 °C)   Ht 5' 8" (1 727 m)   Wt 78 2 kg (172 lb 6 4 oz)   BMI 26 21 kg/m²     Constitutional: normal, well developed, well nourished, alert, in no distress and non-toxic and no overt pain behavior  Eyes: anicteric  HEENT: grossly intact  Neck: supple, symmetric, trachea midline and no masses   Pulmonary:even and unlabored  Cardiovascular:No edema or pitting edema present  Skin:Normal without rashes or lesions and well hydrated  Psychiatric:Mood and affect appropriate  Neurologic:Cranial Nerves II-XII grossly intact  Musculoskeletal:normal gait  Bilateral cervical paraspinals and trapezii tender to palpation ropy in texture  Equivocal Spurling's bilaterally  Bilateral upper extremity strength 5/5 in all muscle groups      Imaging  Imaging reviewed

## 2018-02-22 ENCOUNTER — TELEPHONE (OUTPATIENT)
Dept: PAIN MEDICINE | Facility: MEDICAL CENTER | Age: 43
End: 2018-02-22

## 2018-02-22 ENCOUNTER — OFFICE VISIT (OUTPATIENT)
Dept: PHYSICAL THERAPY | Facility: REHABILITATION | Age: 43
End: 2018-02-22
Payer: COMMERCIAL

## 2018-02-22 DIAGNOSIS — M54.16 LUMBAR RADICULOPATHY: Primary | ICD-10-CM

## 2018-02-22 DIAGNOSIS — M54.12 CERVICAL RADICULOPATHY: ICD-10-CM

## 2018-02-22 PROCEDURE — 97140 MANUAL THERAPY 1/> REGIONS: CPT | Performed by: PHYSICAL THERAPIST

## 2018-02-22 PROCEDURE — 97110 THERAPEUTIC EXERCISES: CPT | Performed by: PHYSICAL THERAPIST

## 2018-02-22 PROCEDURE — 97112 NEUROMUSCULAR REEDUCATION: CPT | Performed by: PHYSICAL THERAPIST

## 2018-02-22 NOTE — TELEPHONE ENCOUNTER
[address]  Hallie Woody from [place] called stating auth for MRI is auth for the wrong location ([place])   stated someone needs to call to have it [facility] for [facility]   any questions c/b [phone]

## 2018-02-22 NOTE — PROGRESS NOTES
Daily Note     Today's date: 2018  Patient name: Trena Pantoja  : 1975  MRN: [id number]  Referring provider: Mayte Corbett MD  Dx:   Encounter Diagnoses   Name Primary?  Lumbar radiculopathy Yes    Cervical radiculopathy                   Subjective: good tolerance to progression patient continues to be most challenged with work  MRI ordered for the C-[facility] we discussed his sporadic appt with PT likley part of the lack of progress recently  Objective: See treatment diary below  Precautions: none    Daily Treatment Diary  1 on PT     Manual  1 29 18 21 22           ISTM UT and  S/L gr 5 mobilization L-S and prone T-S grade 5 ,mobilization/  10min mj           Supine T-S gr 5 mobilization   mj                                                        Exercise Diary  1  18 2 22           Prayer stretch  10''10 10''10           Bridges  20 20           UBE  3/3 3/3           Barrel hugs  5''x10 5''x10           UT stretch  10''x10 10''x10           T-S ext chair  5''x10 5''x10           Foam roll protocol  punches and barhugs 1min each  punches and barhugs 1min each            pball YTI  15each  15each            TB rows /lpd   gtb20           TB ER B/L 15x                                                                                                                                                    Modalities                                                                   Assessment: Tolerated treatment well  Patient demonstrated fatigue post treatment      Plan: Progress note during next visit

## 2018-02-23 NOTE — TELEPHONE ENCOUNTER
--Please see previous tasks-- pt needs an open MRI and auth--    S/W pt  Advised pt of the previous tasks  Advised pt a  will be calling once they got the auth  Pt verbalized understanding

## 2018-03-05 NOTE — TELEPHONE ENCOUNTER
[facility]- Patient called back stating he cannot have MRI done and wanted meds  Stated he was told to have an open MRI but was told that there is none at [facility]   wants to know what to do?  C/b [phone]

## 2018-03-06 DIAGNOSIS — K21.9 GASTROESOPHAGEAL REFLUX DISEASE WITHOUT ESOPHAGITIS: Primary | ICD-10-CM

## 2018-03-06 RX ORDER — PANTOPRAZOLE SODIUM 40 MG/1
TABLET, DELAYED RELEASE ORAL
Qty: 90 TABLET | Refills: 3 | Status: SHIPPED | OUTPATIENT
Start: 2018-03-06 | End: 2019-03-01 | Stop reason: SDUPTHER

## 2018-03-13 NOTE — TELEPHONE ENCOUNTER
Pt called back for an update on getting the authorization for an open air MRI  Pt was scheduled at [facility] tomorrow but is calling to cancel that appt  Pt is requesting a call back once he is authorized to go to an open air facility and he will call to schedule the MRI  Pt can be reached at [phone]

## 2018-04-16 ENCOUNTER — CLINICAL SUPPORT (OUTPATIENT)
Dept: PAIN MEDICINE | Facility: CLINIC | Age: 43
End: 2018-04-16
Payer: COMMERCIAL

## 2018-04-16 VITALS
BODY MASS INDEX: 27.1 KG/M2 | HEIGHT: 68 IN | TEMPERATURE: 98.3 F | HEART RATE: 65 BPM | SYSTOLIC BLOOD PRESSURE: 122 MMHG | DIASTOLIC BLOOD PRESSURE: 82 MMHG | WEIGHT: 178.8 LBS

## 2018-04-16 DIAGNOSIS — M54.2 NECK PAIN: ICD-10-CM

## 2018-04-16 DIAGNOSIS — M48.02 CERVICAL STENOSIS OF SPINE: ICD-10-CM

## 2018-04-16 DIAGNOSIS — M54.12 CERVICAL RADICULOPATHY: Primary | ICD-10-CM

## 2018-04-16 DIAGNOSIS — M47.812 SPONDYLOSIS OF CERVICAL REGION WITHOUT MYELOPATHY OR RADICULOPATHY: ICD-10-CM

## 2018-04-16 DIAGNOSIS — M79.18 MYOFASCIAL PAIN: ICD-10-CM

## 2018-04-16 PROCEDURE — 99213 OFFICE O/P EST LOW 20 MIN: CPT | Performed by: ANESTHESIOLOGY

## 2018-04-16 RX ORDER — FEXOFENADINE HCL AND PSEUDOEPHEDRINE HCI 60; 120 MG/1; MG/1
1 TABLET, EXTENDED RELEASE ORAL 2 TIMES DAILY
COMMUNITY
End: 2018-05-21 | Stop reason: ALTCHOICE

## 2018-04-16 NOTE — PROGRESS NOTES
Assessment:  1  Cervical radiculopathy    2  Spondylosis of cervical region without myelopathy or radiculopathy    3  Cervical stenosis of spine    4  Myofascial pain    5  Neck pain        Plan:  44-year-old male returning for follow-up of neck pain and cervical radiculopathy secondary to cervical stenosis  The patient did have cervical epidural steroid injection x3  The 1st injection was very helpful, however the subsequent injections were ineffective  The patient does have a component of myofascial and facet mediated pain which is contributing to his neck pain from cervical spondylosis  The patient was referred to Neurosurgery to review surgical options, however an updated MRI of the cervical spine was required  The MRI was finally approved and he was waiting for the open MRI facility to call him, however I did discuss with the patient that he needs to call the facility to schedule the appointment  The patient misunderstood our instructions  The patient was taking diclofenac 75 mg b i d  p r n  and chlorzoxazone, however the chlorzoxazone was causing too much drowsiness and the patient was recently diagnosed with Ho's esophagus  He therefore discontinued the use of NSAIDs  He would like to avoid sedating medications at this time  1   I provided the patient with a new order for an MRI of cervical spine advised him to call the open MRI facility to have the study done  2  The patient will call to schedule an appointment with neurosurgery once MRI has been completed  3  We will avoid NSAIDs secondary to Ho's esophagus  4  I did offer the patient a trial of gabapentin, however he would like to avoid sedating medications at this time  5  The patient may benefit from cervical medial branch blocks and if he has a favorable result x2 we could proceed with RFA for longer lasting relief, however we will await the results of the cervical MRI as well as Neurosurgery recommendations  6    Patient will continue with home exercise program  7  I will follow up the patient after his neuro surgical eval      My impressions and treatment recommendations were discussed in detail with the patient who verbalized understanding and had no further questions  Discharge instructions were provided  I personally saw and examined the patient and I agree with the above discussed plan of care  No orders of the defined types were placed in this encounter  New Medications Ordered This Visit   Medications    fexofenadine-pseudoephedrine (ALLEGRA-D)  MG per tablet     Sig: Take 1 tablet by mouth 2 (two) times a day       History of Present Illness:    Michelle Quezada is a 36 y o  male returning for follow-up of neck pain that radiates into his shoulder since scapular regions secondary to cervical degenerative disc disease, spondylosis, and stenosis with radiculitis  The patient did have excellent relief from his cervical epidural steroid injection  Unfortunately subsequent injections were ineffective  He denies any radiating arm pain including numbness, paresthesias, or subjective weakness  He denies any bladder or bowel incontinence or balance issues  The patient was taking diclofenac 75 mg b i d  p r n  which was giving approximately 60% relief, however he was recently diagnosed with Ho's esophagus and discontinue this medication  He was also taking chlorzoxazone, however this was causing sedation  He denies any other side effects to the medications other than those listed above  The patient rates his pain as 6/10 on the pain is worse in the evening  The pain is constant and described as dull, aching, and cramping  The pain is worse with bending and twisting his neck and lifting  The pain is alleviated with relaxation  I have personally reviewed and/or updated the patient's past medical history, past surgical history, family history, social history, current medications, allergies, and vital signs today  Other than as stated above, the patient denies any interval changes in medications, medical condition, mental condition, symptoms, or allergies since the last office visit  Review of Systems:    Review of Systems   Respiratory: Negative for shortness of breath  Cardiovascular: Negative for chest pain  Gastrointestinal: Negative for constipation, diarrhea, nausea and vomiting  Musculoskeletal: Negative for arthralgias, gait problem, joint swelling and myalgias  Skin: Negative for rash  Neurological: Negative for dizziness, seizures and weakness  All other systems reviewed and are negative  Patient Active Problem List   Diagnosis    Cervical spondylosis    Cervical stenosis of spine    Neck pain    Myofascial pain    Cervical radiculopathy       Past Medical History:   Diagnosis Date    Eczema     Migraine     Streptococcal pharyngitis        Past Surgical History:   Procedure Laterality Date    APPENDECTOMY         Family History   Problem Relation Age of Onset    No Known Problems Mother        Social History     Occupational History    Not on file  Social History Main Topics    Smoking status: Current Every Day Smoker    Smokeless tobacco: Not on file    Alcohol use Yes      Comment: occasional    Drug use: No    Sexual activity: Not on file       Current Outpatient Prescriptions on File Prior to Visit   Medication Sig    diclofenac (VOLTAREN) 75 mg EC tablet Take 1 tablet (75 mg total) by mouth 2 (two) times a day    pantoprazole (PROTONIX) 40 mg tablet TAKE 1 TABLET BY MOUTH ONCE DAILY    [DISCONTINUED] Cetirizine HCl (ZYRTEC ALLERGY) 10 MG CAPS Take 1 tablet by mouth daily as needed     No current facility-administered medications on file prior to visit          No Known Allergies    Physical Exam:    /82   Pulse 65   Temp 98 3 °F (36 8 °C)   Ht 5' 8" (1 727 m)   Wt 81 1 kg (178 lb 12 8 oz)   BMI 27 19 kg/m²     Constitutional: normal, well developed, well nourished, alert, in no distress and non-toxic and no overt pain behavior  Eyes: anicteric  HEENT: grossly intact  Neck: supple, symmetric, trachea midline and no masses   Pulmonary:even and unlabored  Cardiovascular:No edema or pitting edema present  Skin:Normal without rashes or lesions and well hydrated  Psychiatric:Mood and affect appropriate  Neurologic:Cranial Nerves II-XII grossly intact  Musculoskeletal:normal gait  Bilateral cervical paraspinals and trapezii tender to palpation and ropy in texture  Equivocal Spurling's bilaterally  Bilateral upper extremity strength 5/5 in all muscle groups      Imaging  Imaging reviewed

## 2018-05-20 PROBLEM — J30.89 ENVIRONMENTAL AND SEASONAL ALLERGIES: Status: ACTIVE | Noted: 2017-05-11

## 2018-05-20 PROBLEM — E78.5 HYPERLIPIDEMIA: Status: ACTIVE | Noted: 2017-05-31

## 2018-05-21 ENCOUNTER — OFFICE VISIT (OUTPATIENT)
Dept: FAMILY MEDICINE CLINIC | Facility: CLINIC | Age: 43
End: 2018-05-21
Payer: COMMERCIAL

## 2018-05-21 ENCOUNTER — DOCTOR'S OFFICE (OUTPATIENT)
Dept: URBAN - METROPOLITAN AREA CLINIC 136 | Facility: CLINIC | Age: 43
Setting detail: OPHTHALMOLOGY
End: 2018-05-21
Payer: COMMERCIAL

## 2018-05-21 VITALS
WEIGHT: 174.6 LBS | SYSTOLIC BLOOD PRESSURE: 110 MMHG | HEIGHT: 68 IN | HEART RATE: 60 BPM | DIASTOLIC BLOOD PRESSURE: 68 MMHG | BODY MASS INDEX: 26.46 KG/M2 | RESPIRATION RATE: 20 BRPM | TEMPERATURE: 97.7 F

## 2018-05-21 DIAGNOSIS — K21.9 GERD WITHOUT ESOPHAGITIS: Primary | ICD-10-CM

## 2018-05-21 DIAGNOSIS — H25.13: ICD-10-CM

## 2018-05-21 DIAGNOSIS — M48.02 CERVICAL STENOSIS OF SPINE: ICD-10-CM

## 2018-05-21 DIAGNOSIS — K22.70 BARRETT'S ESOPHAGUS WITHOUT DYSPLASIA: ICD-10-CM

## 2018-05-21 DIAGNOSIS — F17.210 CIGARETTE NICOTINE DEPENDENCE WITHOUT COMPLICATION: ICD-10-CM

## 2018-05-21 DIAGNOSIS — E78.2 MIXED HYPERLIPIDEMIA: ICD-10-CM

## 2018-05-21 DIAGNOSIS — J30.1 SEASONAL ALLERGIC RHINITIS DUE TO POLLEN: ICD-10-CM

## 2018-05-21 DIAGNOSIS — H40.003: ICD-10-CM

## 2018-05-21 PROCEDURE — 99214 OFFICE O/P EST MOD 30 MIN: CPT | Performed by: FAMILY MEDICINE

## 2018-05-21 PROCEDURE — 92133 CPTRZD OPH DX IMG PST SGM ON: CPT | Performed by: OPHTHALMOLOGY

## 2018-05-21 PROCEDURE — 3725F SCREEN DEPRESSION PERFORMED: CPT | Performed by: FAMILY MEDICINE

## 2018-05-21 PROCEDURE — 92004 COMPRE OPH EXAM NEW PT 1/>: CPT | Performed by: OPHTHALMOLOGY

## 2018-05-21 PROCEDURE — 76514 ECHO EXAM OF EYE THICKNESS: CPT | Performed by: OPHTHALMOLOGY

## 2018-05-21 RX ORDER — CETIRIZINE HYDROCHLORIDE 10 MG/1
10 TABLET, CHEWABLE ORAL DAILY
Qty: 30 TABLET | Refills: 3
Start: 2018-05-21 | End: 2020-07-21

## 2018-05-21 ASSESSMENT — REFRACTION_CURRENTRX
OD_OVR_VA: 20/
OS_OVR_VA: 20/
OD_OVR_VA: 20/
OD_CYLINDER: -2.00
OD_OVR_VA: 20/
OS_OVR_VA: 20/
OS_OVR_VA: 20/
OD_VPRISM_DIRECTION: SV
OS_AXIS: 090
OD_SPHERE: -0.50
OD_AXIS: 095
OS_VPRISM_DIRECTION: SV
OS_SPHERE: -0.50
OS_CYLINDER: -1.75

## 2018-05-21 ASSESSMENT — REFRACTION_MANIFEST
OD_VA2: 20/
OD_VA1: 20/
OS_VA1: 20/
OU_VA: 20/
OS_VA2: 20/
OD_VA2: 20/
OS_VA3: 20/
OS_VA2: 20/
OD_VA3: 20/
OD_VA2: 20/
OD_VA1: 20/
OS_VA3: 20/
OD_VA3: 20/
OU_VA: 20/
OS_VA1: 20/
OU_VA: 20/
OD_VA3: 20/
OS_VA2: 20/
OS_VA3: 20/
OS_VA1: 20/
OD_VA1: 20/

## 2018-05-21 ASSESSMENT — VISUAL ACUITY
OD_BCVA: 20/20
OS_BCVA: 20/20

## 2018-05-21 ASSESSMENT — PACHYMETRY
OD_CT_UM: 582
OD_CT_CORRECTION: -3
OS_CT_UM: 553
OS_CT_CORRECTION: -1

## 2018-05-21 ASSESSMENT — CONFRONTATIONAL VISUAL FIELD TEST (CVF)
OD_FINDINGS: FULL
OS_FINDINGS: FULL

## 2018-05-21 ASSESSMENT — REFRACTION_AUTOREFRACTION
OS_CYLINDER: -2.00
OS_AXIS: 095
OD_CYLINDER: -2.50
OS_SPHERE: -0.25
OD_AXIS: 094
OD_SPHERE: -0.50

## 2018-05-21 ASSESSMENT — SPHEQUIV_DERIVED
OD_SPHEQUIV: -1.75
OS_SPHEQUIV: -1.25

## 2018-05-21 NOTE — PROGRESS NOTES
Assessment/Plan:    Ho's esophagus without dysplasia  Patient has chronic GERD  He was diagnosed with Ho's esophagus this year  EGD done by Dr Aung Granados 2 months ago  Dr Aung Granados recommended to continue Pantoprazole 40 mg daily  Repeat EGD in 1 year  GERD without esophagitis  Continue Pantoprazole 40 mg daily  Avoid caffeine, fried, fatty foods  Hyperlipidemia  Discussed a low-cholesterol, low-fat diet with patient  Encouraged regular exercise  Check CMP, lipid panel  Seasonal allergies  Symptoms are stable  Continue Zyrtec 10 mg daily during allergy seasons  Cigarette nicotine dependence without complication  Counseled patient regarding smoking cessation  Strongly encouraged to stop smoking completely  Cervical stenosis of spine  Patient has chronic neck pain secondary to degenerative disc disease of the cervical spine  Follow -up with management Dr Ericka Castro  Schedule follow-up office  visit in 1 year or call sooner with any acute problems  Problem List Items Addressed This Visit        Digestive    GERD without esophagitis - Primary     Continue Pantoprazole 40 mg daily  Avoid caffeine, fried, fatty foods  Ho's esophagus without dysplasia     Patient has chronic GERD  He was diagnosed with Ho's esophagus this year  EGD done by Dr Aung Granados 2 months ago  Dr Aung Granados recommended to continue Pantoprazole 40 mg daily  Repeat EGD in 1 year  Respiratory    Seasonal allergies     Symptoms are stable  Continue Zyrtec 10 mg daily during allergy seasons  Relevant Medications    cetirizine (ZyrTEC) 10 MG chewable tablet       Other    Cervical stenosis of spine     Patient has chronic neck pain secondary to degenerative disc disease of the cervical spine  Follow -up with management Dr Ericka Castro  Cigarette nicotine dependence without complication     Counseled patient regarding smoking cessation      Strongly encouraged to stop smoking completely  Hyperlipidemia     Discussed a low-cholesterol, low-fat diet with patient  Encouraged regular exercise  Check CMP, lipid panel  Relevant Orders    Comprehensive metabolic panel    Lipid panel            Subjective:      Patient ID: Anne Davis is a 36 y o  male  HPI:    Patient is 36 year male with H/o GERD, Hyperlipidemia, seasonal allergies, cervical spinal stenosis  He presents for annual follow-up visit  Reviewed current medications, last blood work results from May 2017  GERD - patient was evaluated by gastroenterologist   Dr Amanda Tenorio  EGD done 2 months ago which showed Ho's esophagus  Dr Elizabeth Duran recommended patient to repeat EGD in 1 year, continue Pantoprazole 40 mg daily  Patient states that he does not have any heartburn when takes Pantoprazole on a daily basis  No nausea, vomiting, diarrhea, constipation  Patient tries to cut back on cigarettes  He smokes only on weekends  Patient has history of cervical spinal stenosis, cervical radiculopathy  C/o neck pain, back pain  He has been followed by pain management Dr King, last seen in February 2018  Seasonal allergies - patient takes Zyrtec 10 mg daily  Symptoms are stable  HPI     The following portions of the patient's history were reviewed and updated as appropriate: allergies, past family history, past medical history, past social history, past surgical history and problem list     Review of Systems   Constitutional: Negative for activity change, appetite change, chills, fatigue and fever  HENT: Positive for congestion (mild)  Negative for dental problem, ear pain, hearing loss, sinus pressure, tinnitus and trouble swallowing  Eyes: Negative  Respiratory: Negative for chest tightness and wheezing  Cardiovascular: Negative for chest pain, palpitations and leg swelling     Gastrointestinal: Negative for abdominal pain, blood in stool, constipation, diarrhea, nausea and vomiting  Denies heartburn   Genitourinary: Negative for difficulty urinating, dysuria, flank pain, genital sores and hematuria  Musculoskeletal: Positive for back pain and neck pain  Negative for gait problem and joint swelling  Skin: Negative for wound  Neurological: Negative for dizziness, syncope, numbness and headaches  Hematological: Negative  Psychiatric/Behavioral: Negative  Objective:      /68   Pulse 60   Temp 97 7 °F (36 5 °C) (Tympanic)   Resp 20   Ht 5' 8" (1 727 m)   Wt 79 2 kg (174 lb 9 6 oz)   BMI 26 55 kg/m²          Physical Exam   Constitutional: He appears well-developed and well-nourished  HENT:   Head: Normocephalic and atraumatic  Right Ear: External ear normal    Left Ear: External ear normal    Nose: Nose normal    Mouth/Throat: Oropharynx is clear and moist    Eyes: Conjunctivae are normal  Pupils are equal, round, and reactive to light  Neck: Neck supple  Cardiovascular: Normal rate, regular rhythm and normal heart sounds  No murmur heard  No carotid bruits BL  No BL LE edema   Pulmonary/Chest: Effort normal and breath sounds normal  He has no wheezes  He has no rales  Abdominal: Soft  Bowel sounds are normal  There is no tenderness  Musculoskeletal: Normal range of motion  He exhibits no edema, tenderness or deformity  Lymphadenopathy:     He has no cervical adenopathy  Skin: Skin is warm and dry  No rash noted  Psychiatric: He has a normal mood and affect  His behavior is normal    Vitals reviewed

## 2018-05-21 NOTE — ASSESSMENT & PLAN NOTE
Discussed a low-cholesterol, low-fat diet with patient  Encouraged regular exercise  Check CMP, lipid panel

## 2018-05-21 NOTE — ASSESSMENT & PLAN NOTE
Patient has chronic GERD  He was diagnosed with Ho's esophagus this year  EGD done by Dr Chalo Dc 2 months ago  Dr Chalo Dc recommended to continue Pantoprazole 40 mg daily  Repeat EGD in 1 year

## 2018-05-21 NOTE — ASSESSMENT & PLAN NOTE
Patient has chronic neck pain secondary to degenerative disc disease of the cervical spine  Follow -up with management Dr Ellie David

## 2018-06-14 ENCOUNTER — TELEPHONE (OUTPATIENT)
Dept: PAIN MEDICINE | Facility: MEDICAL CENTER | Age: 43
End: 2018-06-14

## 2018-06-14 DIAGNOSIS — M48.02 CERVICAL STENOSIS OF SPINE: Primary | ICD-10-CM

## 2018-06-14 DIAGNOSIS — M54.12 CERVICAL RADICULOPATHY: ICD-10-CM

## 2018-06-14 DIAGNOSIS — M48.02 NEURAL FORAMINAL STENOSIS OF CERVICAL SPINE: ICD-10-CM

## 2018-06-14 NOTE — TELEPHONE ENCOUNTER
Patient called stating he had his MRI and was told that once he had his MRI done to call back for Dr Ludin Huitron to give him a referral to Neuro   C/b [phone]

## 2018-08-08 ENCOUNTER — DOCTOR'S OFFICE (OUTPATIENT)
Dept: URBAN - METROPOLITAN AREA CLINIC 136 | Facility: CLINIC | Age: 43
Setting detail: OPHTHALMOLOGY
End: 2018-08-08
Payer: COMMERCIAL

## 2018-08-08 ENCOUNTER — RX ONLY (RX ONLY)
Age: 43
End: 2018-08-08

## 2018-08-08 DIAGNOSIS — H25.13: ICD-10-CM

## 2018-08-08 DIAGNOSIS — H40.013: ICD-10-CM

## 2018-08-08 PROCEDURE — 92250 FUNDUS PHOTOGRAPHY W/I&R: CPT | Performed by: OPHTHALMOLOGY

## 2018-08-08 PROCEDURE — 92014 COMPRE OPH EXAM EST PT 1/>: CPT | Performed by: OPHTHALMOLOGY

## 2018-08-08 PROCEDURE — 76514 ECHO EXAM OF EYE THICKNESS: CPT | Performed by: OPHTHALMOLOGY

## 2018-08-08 PROCEDURE — 92083 EXTENDED VISUAL FIELD XM: CPT | Performed by: OPHTHALMOLOGY

## 2018-08-08 ASSESSMENT — REFRACTION_MANIFEST
OU_VA: 20/
OS_VA1: 20/
OD_VA1: 20/
OD_VA2: 20/
OS_VA3: 20/
OS_VA3: 20/
OS_VA2: 20/
OU_VA: 20/
OD_VA3: 20/
OS_VA3: 20/
OD_VA2: 20/
OD_VA3: 20/
OD_VA1: 20/
OU_VA: 20/
OD_VA3: 20/
OD_VA1: 20/
OD_VA2: 20/
OS_VA2: 20/
OS_VA2: 20/
OS_VA1: 20/
OS_VA1: 20/

## 2018-08-08 ASSESSMENT — CONFRONTATIONAL VISUAL FIELD TEST (CVF)
OS_FINDINGS: FULL
OD_FINDINGS: FULL

## 2018-08-08 ASSESSMENT — REFRACTION_CURRENTRX
OS_SPHERE: -0.50
OS_OVR_VA: 20/
OS_CYLINDER: -1.75
OD_OVR_VA: 20/
OD_SPHERE: -0.50
OS_OVR_VA: 20/
OD_CYLINDER: -2.00
OD_OVR_VA: 20/
OD_OVR_VA: 20/
OD_AXIS: 095
OD_VPRISM_DIRECTION: SV
OS_AXIS: 090
OS_VPRISM_DIRECTION: SV
OS_OVR_VA: 20/

## 2018-08-08 ASSESSMENT — PACHYMETRY
OS_CT_CORRECTION: -1
OD_CT_UM: 547
OS_CT_UM: 555
OD_CT_CORRECTION: 0

## 2018-08-08 ASSESSMENT — REFRACTION_AUTOREFRACTION
OS_AXIS: 097
OD_CYLINDER: -2.00
OS_CYLINDER: -2.00
OD_AXIS: 095
OD_SPHERE: -0.50
OS_SPHERE: -0.50

## 2018-08-08 ASSESSMENT — VISUAL ACUITY
OS_BCVA: 20/20
OD_BCVA: 20/20

## 2018-08-08 ASSESSMENT — SPHEQUIV_DERIVED
OS_SPHEQUIV: -1.5
OD_SPHEQUIV: -1.5

## 2018-08-23 ENCOUNTER — TELEPHONE (OUTPATIENT)
Dept: NEUROSURGERY | Facility: CLINIC | Age: 43
End: 2018-08-23

## 2018-08-23 NOTE — TELEPHONE ENCOUNTER
LMOM in regards to apt on Monday , dr Severino Mahoney will be in the OR need to reschedule apt, left my ext to call me back       Said that if we can reschedule for tomorrow festus is booked out till oct or can reschedule with Tello Greenberg next week

## 2018-08-27 ENCOUNTER — OFFICE VISIT (OUTPATIENT)
Dept: NEUROSURGERY | Facility: CLINIC | Age: 43
End: 2018-08-27
Payer: COMMERCIAL

## 2018-08-27 VITALS
SYSTOLIC BLOOD PRESSURE: 128 MMHG | HEIGHT: 68 IN | DIASTOLIC BLOOD PRESSURE: 81 MMHG | WEIGHT: 172 LBS | BODY MASS INDEX: 26.07 KG/M2 | TEMPERATURE: 97.9 F | HEART RATE: 62 BPM | RESPIRATION RATE: 16 BRPM

## 2018-08-27 DIAGNOSIS — M48.02 NEURAL FORAMINAL STENOSIS OF CERVICAL SPINE: ICD-10-CM

## 2018-08-27 DIAGNOSIS — M48.02 CERVICAL STENOSIS OF SPINE: ICD-10-CM

## 2018-08-27 DIAGNOSIS — M54.12 CERVICAL RADICULOPATHY: ICD-10-CM

## 2018-08-27 PROCEDURE — 99203 OFFICE O/P NEW LOW 30 MIN: CPT | Performed by: NEUROLOGICAL SURGERY

## 2018-08-27 NOTE — PROGRESS NOTES
Assessment/Plan:    No problem-specific Assessment & Plan notes found for this encounter  Patient with cervical radiculopathy C7-T1 L>R, persistent sx despite PT and injections  MRI suboptimal for surgical planning  Will attempt MRI under anesthesia  Would like to f/u in 3 months prior to decision making for surgical intervention  If progressing to surgery will perform an anterior cervical diskectomy and fusion at C6-T1  If C7-T1 not possible through anterior approach will perform posterior C7-T1 laminectomy and bilateral foraminotomies  Diagnoses and all orders for this visit:    Cervical radiculopathy  -     Ambulatory referral to Neurosurgery  -     MRI cervical spine without contrast; Future    Cervical stenosis of spine  -     Ambulatory referral to Neurosurgery    Neural foraminal stenosis of cervical spine  -     Ambulatory referral to Neurosurgery  -     MRI cervical spine without contrast; Future          Subjective:      Patient ID: Johnnie Rodriguez is a 36 y o  male  36year old male with 2 5 yr hx of severe neck and LUE and R shoulder pain  Constant with intermittent flares  Had some improvement in sx of pain and hypoaesthesia with nerve root injection by Dr King at C7-T1 on the left and right  Has tried PT as well  Has improved sx but still has residual shoulder pain on the right and left as well as neck pain  No myelopathic signs  No b/b issues  He is right handed  He works for [employer] at this time  He expresses wishes to delay any surgical intervention until after December at this time  The following portions of the patient's history were reviewed and updated as appropriate: allergies, current medications, past family history, past medical history, past social history, past surgical history and problem list     Review of Systems   Constitutional: Negative for chills, fatigue and fever  HENT: Negative  Eyes: Negative for pain and visual disturbance     Respiratory: Negative for cough, shortness of breath and wheezing  Cardiovascular: Negative for chest pain and palpitations  Gastrointestinal: Negative for abdominal pain and nausea  Endocrine: Negative  Musculoskeletal: Positive for arthralgias, back pain, neck pain and neck stiffness  Negative for gait problem  Allergic/Immunologic: Negative  Neurological: Negative for dizziness, speech difficulty, weakness, numbness and headaches  Hematological: Negative  Psychiatric/Behavioral: Negative  Objective:      /81 (BP Location: Left arm, Patient Position: Sitting, Cuff Size: Standard)   Pulse 62   Temp 97 9 °F (36 6 °C) (Tympanic)   Resp 16   Ht 5' 8" (1 727 m)   Wt 78 kg (172 lb)   BMI 26 15 kg/m²          Physical Exam   Constitutional: He is oriented to person, place, and time  He appears well-developed and well-nourished  No distress  HENT:   Head: Normocephalic and atraumatic  Eyes: Conjunctivae and EOM are normal  Pupils are equal, round, and reactive to light  Neck: Normal range of motion  Neck supple  No tracheal deviation present  No thyromegaly present  Cardiovascular: Normal rate, regular rhythm and normal heart sounds  Pulmonary/Chest: Effort normal and breath sounds normal    Abdominal: Soft  Bowel sounds are normal    Musculoskeletal: Normal range of motion  Neurological: He is alert and oriented to person, place, and time  He has normal strength and normal reflexes  No cranial nerve deficit or sensory deficit  He exhibits normal muscle tone  GCS eye subscore is 4  GCS verbal subscore is 5  GCS motor subscore is 6  Skin: He is not diaphoretic

## 2018-10-25 ENCOUNTER — IMMUNIZATION (OUTPATIENT)
Dept: FAMILY MEDICINE CLINIC | Facility: CLINIC | Age: 43
End: 2018-10-25
Payer: COMMERCIAL

## 2018-10-25 DIAGNOSIS — Z23 ENCOUNTER FOR IMMUNIZATION: ICD-10-CM

## 2018-10-25 LAB
ALBUMIN SERPL-MCNC: 4.5 G/DL (ref 3.6–5.1)
ALBUMIN/GLOB SERPL: 1.9 (CALC) (ref 1–2.5)
ALP SERPL-CCNC: 87 U/L (ref 40–115)
ALT SERPL-CCNC: 27 U/L (ref 9–46)
AST SERPL-CCNC: 20 U/L (ref 10–40)
BILIRUB SERPL-MCNC: 0.9 MG/DL (ref 0.2–1.2)
BUN SERPL-MCNC: 17 MG/DL (ref 7–25)
BUN/CREAT SERPL: NORMAL (CALC) (ref 6–22)
CALCIUM SERPL-MCNC: 9.2 MG/DL (ref 8.6–10.3)
CHLORIDE SERPL-SCNC: 102 MMOL/L (ref 98–110)
CHOLEST SERPL-MCNC: 239 MG/DL
CHOLEST/HDLC SERPL: 5.3 (CALC)
CO2 SERPL-SCNC: 27 MMOL/L (ref 20–32)
CREAT SERPL-MCNC: 0.96 MG/DL (ref 0.6–1.35)
GLOBULIN SER CALC-MCNC: 2.4 G/DL (CALC) (ref 1.9–3.7)
GLUCOSE SERPL-MCNC: 82 MG/DL (ref 65–99)
HDLC SERPL-MCNC: 45 MG/DL
LDLC SERPL CALC-MCNC: 152 MG/DL (CALC)
NONHDLC SERPL-MCNC: 194 MG/DL (CALC)
POTASSIUM SERPL-SCNC: 3.6 MMOL/L (ref 3.5–5.3)
PROT SERPL-MCNC: 6.9 G/DL (ref 6.1–8.1)
SL AMB EGFR AFRICAN AMERICAN: 111 ML/MIN/1.73M2
SL AMB EGFR NON AFRICAN AMERICAN: 96 ML/MIN/1.73M2
SODIUM SERPL-SCNC: 138 MMOL/L (ref 135–146)
TRIGL SERPL-MCNC: 245 MG/DL

## 2018-10-25 PROCEDURE — 90471 IMMUNIZATION ADMIN: CPT

## 2018-10-25 PROCEDURE — 90686 IIV4 VACC NO PRSV 0.5 ML IM: CPT

## 2018-10-27 DIAGNOSIS — E78.2 MIXED HYPERLIPIDEMIA: Primary | ICD-10-CM

## 2018-11-02 ENCOUNTER — TELEPHONE (OUTPATIENT)
Dept: PAIN MEDICINE | Facility: MEDICAL CENTER | Age: 43
End: 2018-11-02

## 2018-11-02 NOTE — TELEPHONE ENCOUNTER
Pt is calling stating that he does not want to take any more medication and would like to know if Dr Chente Rios can refer him out to a doctor that prescribes medical marijuana   Pt can be reached at [phone]

## 2018-11-02 NOTE — TELEPHONE ENCOUNTER
The patient can either come in or we can mail a list of medical marijuana providers in the area to the patient

## 2018-11-05 NOTE — TELEPHONE ENCOUNTER
The patient does not need to be seen in the office for a visit    I meant, he can just come in and  the list of providers or we can mail it to him

## 2018-11-05 NOTE — TELEPHONE ENCOUNTER
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

## 2018-11-07 ENCOUNTER — TELEPHONE (OUTPATIENT)
Dept: PAIN MEDICINE | Facility: CLINIC | Age: 43
End: 2018-11-07

## 2019-01-04 ENCOUNTER — OFFICE VISIT (OUTPATIENT)
Dept: FAMILY MEDICINE CLINIC | Facility: CLINIC | Age: 44
End: 2019-01-04
Payer: COMMERCIAL

## 2019-01-04 VITALS
BODY MASS INDEX: 26.34 KG/M2 | HEART RATE: 68 BPM | DIASTOLIC BLOOD PRESSURE: 80 MMHG | RESPIRATION RATE: 16 BRPM | TEMPERATURE: 98.3 F | WEIGHT: 173.8 LBS | HEIGHT: 68 IN | SYSTOLIC BLOOD PRESSURE: 120 MMHG

## 2019-01-04 DIAGNOSIS — J01.90 ACUTE NON-RECURRENT SINUSITIS, UNSPECIFIED LOCATION: Primary | ICD-10-CM

## 2019-01-04 PROCEDURE — 99213 OFFICE O/P EST LOW 20 MIN: CPT | Performed by: NURSE PRACTITIONER

## 2019-01-04 RX ORDER — AMOXICILLIN 875 MG/1
875 TABLET, COATED ORAL 2 TIMES DAILY
Qty: 14 TABLET | Refills: 0 | Status: SHIPPED | OUTPATIENT
Start: 2019-01-04 | End: 2019-01-11

## 2019-01-04 NOTE — PROGRESS NOTES
Chief Complaint   Patient presents with    Sinus Problem     Assessment/Plan:    Acute sinusitis-  To start Amoxicillin  Increase PO fluids and rest   Warm salt water gargles, throat lozenges  Saline nasal spray or Flonase OTC prn  Declined a cough suppressant today  OTC Tylenol prn, max of 3g/24 hours  Call office if symptoms worsen or persist     Nicotine dependence- advised to completely stop smoking  He declined any assistance with this today     There are no diagnoses linked to this encounter  Subjective:      Patient ID: Shayy Fabian is a 39 y o  male  HPI     Pt presents by himself today for an acute visit   C/o nasal congestion which started 2 days ago  Feels much worse this morning   +sore throat   +sinus pressure   +dry cough, productive in the AM  No fevers or chills  No SOB or wheezing  No N/V/D, appetite is normal     Current every day smoker, smokes 1/2 ppd   Denies H/O asthma or COPD    The following portions of the patient's history were reviewed and updated as appropriate: allergies, current medications, past medical history, past social history and problem list     Review of Systems   Constitutional: Positive for fatigue  Negative for activity change, appetite change, chills, diaphoresis, fever and unexpected weight change  HENT: Positive for congestion, postnasal drip, sinus pressure and sore throat  Negative for ear discharge, ear pain, hearing loss, rhinorrhea, sinus pain, tinnitus, trouble swallowing and voice change  Eyes: Negative for discharge, itching and visual disturbance  Respiratory: Positive for cough  Negative for chest tightness, shortness of breath and wheezing  Cardiovascular: Negative for chest pain, palpitations and leg swelling  Gastrointestinal: Negative for abdominal pain, constipation, diarrhea and nausea  Musculoskeletal: Negative for arthralgias and myalgias  Skin: Negative for color change, pallor, rash and wound     Neurological: Negative for dizziness, weakness and headaches  Hematological: Negative for adenopathy  Objective:      /80   Pulse 68   Temp 98 3 °F (36 8 °C) (Oral)   Resp 16   Ht 5' 8" (1 727 m)   Wt 78 8 kg (173 lb 12 8 oz)   BMI 26 43 kg/m²          Physical Exam   Constitutional: He is oriented to person, place, and time  He appears well-developed and well-nourished  No distress  HENT:   Head: Normocephalic and atraumatic  Right Ear: Hearing, tympanic membrane, external ear and ear canal normal    Left Ear: Hearing, tympanic membrane, external ear and ear canal normal    Nose: Mucosal edema (erythematous) present  Right sinus exhibits frontal sinus tenderness  Right sinus exhibits no maxillary sinus tenderness  Left sinus exhibits frontal sinus tenderness  Left sinus exhibits no maxillary sinus tenderness  Mouth/Throat: Mucous membranes are normal  Posterior oropharyngeal erythema (PND) present  No oropharyngeal exudate  Eyes: Pupils are equal, round, and reactive to light  Conjunctivae are normal    Neck: Normal range of motion  Neck supple  Cardiovascular: Normal rate, regular rhythm and normal heart sounds  No murmur heard  Pulmonary/Chest: Effort normal and breath sounds normal  No respiratory distress  He has no wheezes  He has no rales  He exhibits no tenderness  Abdominal: Soft  Bowel sounds are normal  He exhibits no distension  There is no tenderness  Musculoskeletal: Normal range of motion  Lymphadenopathy:     He has no cervical adenopathy  Neurological: He is alert and oriented to person, place, and time  Skin: Skin is warm and dry  He is not diaphoretic  Psychiatric: He has a normal mood and affect

## 2019-02-11 ENCOUNTER — TELEPHONE (OUTPATIENT)
Dept: PAIN MEDICINE | Facility: CLINIC | Age: 44
End: 2019-02-11

## 2019-02-11 DIAGNOSIS — G89.29 CHRONIC LOW BACK PAIN, UNSPECIFIED BACK PAIN LATERALITY, WITH SCIATICA PRESENCE UNSPECIFIED: Primary | ICD-10-CM

## 2019-02-11 DIAGNOSIS — M54.5 CHRONIC LOW BACK PAIN, UNSPECIFIED BACK PAIN LATERALITY, WITH SCIATICA PRESENCE UNSPECIFIED: Primary | ICD-10-CM

## 2019-02-11 NOTE — TELEPHONE ENCOUNTER
S/w pt, he is getting an MRI ordered by Doreen King under anesthesia in order to see neuro sx and he is at the lab needing to get a BUN/Creatinine to access kidney levels prior  Asking for order to be faxed to # below

## 2019-02-11 NOTE — TELEPHONE ENCOUNTER
S/w pt, is waiting in [facility] to get blood work needed for MRI scheduled 2/27/19   Pt is requesting order to be faxed ASAP, please call pt either way    Fax # for Gregory Cifuentes [phone]

## 2019-02-12 LAB
BUN SERPL-MCNC: 17 MG/DL (ref 7–25)
CREAT SERPL-MCNC: 0.91 MG/DL (ref 0.6–1.35)
SL AMB EGFR AFRICAN AMERICAN: 118 ML/MIN/1.73M2
SL AMB EGFR NON AFRICAN AMERICAN: 102 ML/MIN/1.73M2

## 2019-02-19 ENCOUNTER — TELEPHONE (OUTPATIENT)
Dept: FAMILY MEDICINE CLINIC | Facility: CLINIC | Age: 44
End: 2019-02-19

## 2019-02-19 ENCOUNTER — TELEPHONE (OUTPATIENT)
Dept: PAIN MEDICINE | Facility: CLINIC | Age: 44
End: 2019-02-19

## 2019-02-19 ENCOUNTER — OFFICE VISIT (OUTPATIENT)
Dept: FAMILY MEDICINE CLINIC | Facility: CLINIC | Age: 44
End: 2019-02-19
Payer: COMMERCIAL

## 2019-02-19 VITALS
SYSTOLIC BLOOD PRESSURE: 120 MMHG | OXYGEN SATURATION: 98 % | BODY MASS INDEX: 27.1 KG/M2 | HEART RATE: 64 BPM | DIASTOLIC BLOOD PRESSURE: 78 MMHG | RESPIRATION RATE: 16 BRPM | TEMPERATURE: 98.1 F | HEIGHT: 68 IN | WEIGHT: 178.8 LBS

## 2019-02-19 DIAGNOSIS — M54.2 CHRONIC NECK AND BACK PAIN: ICD-10-CM

## 2019-02-19 DIAGNOSIS — F17.210 CIGARETTE NICOTINE DEPENDENCE WITHOUT COMPLICATION: ICD-10-CM

## 2019-02-19 DIAGNOSIS — M48.02 NEURAL FORAMINAL STENOSIS OF CERVICAL SPINE: ICD-10-CM

## 2019-02-19 DIAGNOSIS — K22.70 BARRETT'S ESOPHAGUS WITHOUT DYSPLASIA: ICD-10-CM

## 2019-02-19 DIAGNOSIS — M48.02 CERVICAL STENOSIS OF SPINE: ICD-10-CM

## 2019-02-19 DIAGNOSIS — G89.29 CHRONIC NECK AND BACK PAIN: ICD-10-CM

## 2019-02-19 DIAGNOSIS — M54.12 CERVICAL RADICULOPATHY: ICD-10-CM

## 2019-02-19 DIAGNOSIS — Z00.00 WELL ADULT EXAM: Primary | ICD-10-CM

## 2019-02-19 DIAGNOSIS — K21.9 GERD WITHOUT ESOPHAGITIS: ICD-10-CM

## 2019-02-19 DIAGNOSIS — M54.9 CHRONIC NECK AND BACK PAIN: ICD-10-CM

## 2019-02-19 PROBLEM — Z01.818 PREPROCEDURAL GENERAL PHYSICAL EXAMINATION: Status: ACTIVE | Noted: 2019-02-19

## 2019-02-19 PROCEDURE — 99396 PREV VISIT EST AGE 40-64: CPT | Performed by: FAMILY MEDICINE

## 2019-02-19 NOTE — TELEPHONE ENCOUNTER
Mya Cosby from [facility] contacted call center stated  patient is schedule for a physical today to clear patient for general anesthesia during an MRI  She will like to know if there is  a form they need to send back, and to see if he needs an EKG as well ?                     Call back# [phone]  Fax# [phone]

## 2019-02-19 NOTE — TELEPHONE ENCOUNTER
Patient was told he needs a physical form for a MRI being done on 02/27  Does he need to be seen? Please advise

## 2019-02-19 NOTE — TELEPHONE ENCOUNTER
Laura Call, [facility] phoned to state she is faxing the physical forms - we can complete these or they can use the H&P documented in patient's chart today  Also, they patient does not need an EKG

## 2019-02-19 NOTE — PROGRESS NOTES
Chief Complaint   Patient presents with    Physical Exam     H&P for MRI w/ General Anesthesia 2/27/19     Health Maintenance   Topic Date Due    BMI: Followup Plan  10/06/1992    Pneumococcal PPSV23 Medium Risk Adult (1 of 1 - PPSV23) 10/06/1993    Depression Screening PHQ  05/21/2019    BMI: Adult  02/19/2020    DTaP,Tdap,and Td Vaccines (2 - Td) 09/01/2026    INFLUENZA VACCINE  Completed    HEPATITIS B VACCINES  Aged Out     Assessment/Plan:    Ho's esophagus without dysplasia  Patient has chronic GERD  He was diagnosed with Ho's esophagus last year  EGD done by Dr Jim Gamble in March 2018 showed Ho's esophagus  Continue Pantoprazole 40 mg daily  Call gastroenterologist Dr Jim Gamble to schedule follow-up EGD  Cigarette nicotine dependence without complication  Strongly encouraged patient to quit smoking  Well adult exam  Patient is scheduled for MRI of the cervical spine with general anesthesia on 2/27/19  Cardiopulmonary status is stable  Patient is medically stable to proceed with MRI as scheduled  Diagnoses and all orders for this visit:    Well adult exam    Cervical stenosis of spine    Chronic neck and back pain    Neural foraminal stenosis of cervical spine    Cervical radiculopathy    GERD without esophagitis    Ho's esophagus without dysplasia    Cigarette nicotine dependence without complication    Other orders  -     NON FORMULARY; MEDICAL MARIJUANA          Subjective:      Patient ID: Dalia Mitchell is a 39 y o  male  HPI     Patient presents for physical exam, medical clearance for MRI cervical spine with general anesthesia scheduled on 2/27/19  Patient has chronic neck and back pain, cervical spinal stenosis, cervical radiculopathy  He was evaluated in 8/18 by neurosurgeon Dr Melyssa Arevalo  who requested MRI cervical spine under general anesthesia for surgical planning  Patient was seen by pain management Dr Isa Duarte last year      Patient states that he stopped  taking all pain medications due to GI side effects  Patient has GERD, Ho's esophagus  He was seen by gastroenterologist Dr Claribel Guo last year, had EGD done in 3/18 which showed Ho's esophagus  Currently taking Pantoprazole 40 mg daily  Denies abdominal pain, heartburn, constipation, diarrhea  Patient is a current smoker  He smokes 3-10 cigarettes daily  Drinks alcohol occasionally  Patient was seen by Dr Tierra Perez in [facility] who  prescribed medical marijuana  No prior H/o allergies to anesthesia drugs  Patient had blood work done on 2/11/19  BUN 17, creatinine 0 91  The following portions of the patient's history were reviewed and updated as appropriate: allergies, past family history, past medical history, past social history, past surgical history and problem list     Review of Systems   Constitutional: Negative for activity change, appetite change, chills, fatigue and fever  HENT: Negative for congestion, dental problem, ear pain, hearing loss, sore throat, tinnitus and trouble swallowing  Eyes: Negative for pain, discharge, redness, itching and visual disturbance  Respiratory: Negative for cough, chest tightness, shortness of breath and wheezing  Cardiovascular: Negative for chest pain, palpitations and leg swelling  Gastrointestinal: Negative for abdominal pain, blood in stool, constipation, diarrhea, nausea and vomiting  Denies heartburn   Genitourinary: Negative for difficulty urinating, dysuria, flank pain, frequency and hematuria  Musculoskeletal: Positive for back pain (upper back pain ) and neck pain (chronic neck pain radiating to upper back, shoulders, arms)  Negative for gait problem, joint swelling and myalgias  Neurological: Negative for dizziness, syncope, numbness and headaches  Hematological: Negative  Psychiatric/Behavioral: Negative            Objective:      /78 (BP Location: Left arm, Patient Position: Sitting, Cuff Size: Adult)   Pulse 64   Temp 98 1 °F (36 7 °C) (Tympanic)   Resp 16   Ht 5' 8" (1 727 m)   Wt 81 1 kg (178 lb 12 8 oz)   SpO2 98%   BMI 27 19 kg/m²          Physical Exam   Constitutional: He is oriented to person, place, and time  He appears well-developed and well-nourished  HENT:   Head: Normocephalic  Right Ear: External ear normal    Left Ear: External ear normal    Mouth/Throat: Oropharynx is clear and moist    Eyes: Pupils are equal, round, and reactive to light  Conjunctivae are normal    Neck: Neck supple  Cardiovascular: Normal rate, regular rhythm and normal heart sounds  No murmur heard  No carotid bruits BL  No BL LE  edema   Pulmonary/Chest: Effort normal and breath sounds normal    Abdominal: Soft  Bowel sounds are normal  There is no tenderness  Musculoskeletal:   Neck : decreased ROM with lateral rotation  No spinal or paraspinal tenderness  Lymphadenopathy:     He has no cervical adenopathy  Neurological: He is alert and oriented to person, place, and time  A cranial nerve deficit is present  Coordination normal    Skin: Skin is warm and dry  No rash noted  Psychiatric: He has a normal mood and affect  Nursing note and vitals reviewed

## 2019-02-19 NOTE — TELEPHONE ENCOUNTER
I called Dr Corina Draper office, and they will get back to us prior to the appointment today with those answers

## 2019-02-19 NOTE — TELEPHONE ENCOUNTER
Lázaro Falcon, [facility] phoned to state she is faxing the physical forms - we can complete these or they can use the H&P documented in patient's chart today  Also, they patient does not need an EKG

## 2019-02-19 NOTE — TELEPHONE ENCOUNTER
S/w procedure scheduling for MRI anesthesia, she said she is going to fax the form to us at [facility], I will call for direct fax for PCP office  She said an EKG is not needed unless PCP thinks the patient needs it

## 2019-02-19 NOTE — ASSESSMENT & PLAN NOTE
Patient has chronic GERD  He was diagnosed with Ho's esophagus last year  EGD done by Dr Ciro Lazo in March 2018 showed Ho's esophagus  Continue Pantoprazole 40 mg daily  Call gastroenterologist Dr Ciro Lazo to schedule follow-up EGD

## 2019-02-19 NOTE — ASSESSMENT & PLAN NOTE
Patient is scheduled for MRI of the cervical spine with general anesthesia on 2/27/19  Cardiopulmonary status is stable  Patient is medically stable to proceed with MRI as scheduled

## 2019-02-19 NOTE — TELEPHONE ENCOUNTER
I scheduled patient for a physical today to clear patient for general anesthesia during an MRI  Calling Dr Cummings Sin office to see if they have a form we need to send back, and to see if he needs an EKG as well

## 2019-02-25 ENCOUNTER — ANESTHESIA EVENT (OUTPATIENT)
Dept: RADIOLOGY | Facility: HOSPITAL | Age: 44
End: 2019-02-25

## 2019-02-26 PROBLEM — Z00.00 WELL ADULT EXAM: Status: ACTIVE | Noted: 2019-02-26

## 2019-02-27 ENCOUNTER — HOSPITAL ENCOUNTER (OUTPATIENT)
Dept: RADIOLOGY | Facility: HOSPITAL | Age: 44
Discharge: HOME | End: 2019-02-27
Attending: ANESTHESIOLOGY
Payer: COMMERCIAL

## 2019-02-27 ENCOUNTER — ANESTHESIA (OUTPATIENT)
Dept: RADIOLOGY | Facility: HOSPITAL | Age: 44
End: 2019-02-27

## 2019-02-27 VITALS
WEIGHT: 170 LBS | RESPIRATION RATE: 20 BRPM | HEART RATE: 62 BPM | OXYGEN SATURATION: 100 % | BODY MASS INDEX: 25.18 KG/M2 | HEIGHT: 69 IN | DIASTOLIC BLOOD PRESSURE: 79 MMHG | SYSTOLIC BLOOD PRESSURE: 122 MMHG | TEMPERATURE: 99.3 F

## 2019-02-27 DIAGNOSIS — M54.12 CERVICAL RADICULOPATHY: ICD-10-CM

## 2019-02-27 PROCEDURE — 72141 MRI NECK SPINE W/O DYE: CPT

## 2019-02-27 RX ORDER — PROPOFOL 10 MG/ML
INJECTION, EMULSION INTRAVENOUS AS NEEDED
Status: DISCONTINUED | OUTPATIENT
Start: 2019-02-27 | End: 2019-02-27 | Stop reason: SURG

## 2019-02-27 RX ORDER — ONDANSETRON 2 MG/ML
INJECTION INTRAMUSCULAR; INTRAVENOUS AS NEEDED
Status: DISCONTINUED | OUTPATIENT
Start: 2019-02-27 | End: 2019-02-27 | Stop reason: SURG

## 2019-02-27 RX ORDER — SODIUM CHLORIDE, SODIUM LACTATE, POTASSIUM CHLORIDE, CALCIUM CHLORIDE 600; 310; 30; 20 MG/100ML; MG/100ML; MG/100ML; MG/100ML
20 INJECTION, SOLUTION INTRAVENOUS CONTINUOUS
Status: DISCONTINUED | OUTPATIENT
Start: 2019-02-27 | End: 2019-02-28 | Stop reason: HOSPADM

## 2019-02-27 RX ORDER — DEXAMETHASONE SODIUM PHOSPHATE 4 MG/ML
INJECTION, SOLUTION INTRA-ARTICULAR; INTRALESIONAL; INTRAMUSCULAR; INTRAVENOUS; SOFT TISSUE AS NEEDED
Status: DISCONTINUED | OUTPATIENT
Start: 2019-02-27 | End: 2019-02-27 | Stop reason: SURG

## 2019-02-27 RX ADMIN — PROPOFOL 200 MG: 10 INJECTION, EMULSION INTRAVENOUS at 12:37

## 2019-02-27 RX ADMIN — DEXAMETHASONE SODIUM PHOSPHATE 4 MG: 4 INJECTION, SOLUTION INTRAMUSCULAR; INTRAVENOUS at 12:37

## 2019-02-27 RX ADMIN — ONDANSETRON 4 MG: 2 INJECTION INTRAMUSCULAR; INTRAVENOUS at 12:37

## 2019-02-27 RX ADMIN — SODIUM CHLORIDE, SODIUM LACTATE, POTASSIUM CHLORIDE, AND CALCIUM CHLORIDE 20 ML/HR: .6; .31; .03; .02 INJECTION, SOLUTION INTRAVENOUS at 11:58

## 2019-02-27 NOTE — ANESTHESIA PREPROCEDURE EVALUATION
Review of Systems/Medical History  Patient summary reviewed  Chart reviewed  No history of anesthetic complications     Cardiovascular  Exercise tolerance (METS): >4,  Hyperlipidemia,    Pulmonary  Smoker cigarette smoker  , Tobacco cessation counseling given ,        GI/Hepatic    GERD well controlled,             Endo/Other     GYN       Hematology   Musculoskeletal  Back pain , cervical pain,   Arthritis     Neurology    Headaches,    Psychology           Physical Exam    Airway    Mallampati score: II  TM Distance: >3 FB  Neck ROM: full     Dental   No notable dental hx     Cardiovascular      Pulmonary      Other Findings        Anesthesia Plan  ASA Score- 2     Anesthesia Type- general with ASA Monitors  Additional Monitors:   Airway Plan: LMA  Plan Factors- Patient instructed to abstain from smoking on day of procedure  Patient did not smoke on day of surgery  Induction- intravenous  Postoperative Plan-     Informed Consent- Anesthetic plan and risks discussed with patient  I personally reviewed this patient with the CRNA  Discussed and agreed on the Anesthesia Plan with the CRNA  Edmund Davis

## 2019-02-27 NOTE — PROGRESS NOTES
Pt awake and alert  VS stable  Pt has no questions at this time  Pt notified of transfer to [facility], called [facility] spoke with [place]

## 2019-02-27 NOTE — ANESTHESIA POSTPROCEDURE EVALUATION
Post-Op Assessment Note    CV Status:  Stable  Pain Score: 0    Pain management: adequate     Mental Status:  Alert and awake   Hydration Status:  Euvolemic and stable   PONV Controlled:  None   Airway Patency:  Patent   Post Op Vitals Reviewed: Yes      Staff: Anesthesiologist           BP      Temp      Pulse     Resp      SpO2

## 2019-02-27 NOTE — DISCHARGE INSTRUCTIONS
Magnetic Resonance Imaging   WHAT YOU NEED TO KNOW:   Magnetic resonance imaging (MRI) is a test that uses magnetic fields and radio waves to take pictures inside of your body  An MRI is used to see blood vessels, tissue, muscles, and bones  It can also show organs, such as your heart, lungs, or liver  An MRI can help your healthcare provider diagnose or treat a medical condition  It does not use radiation  DISCHARGE INSTRUCTIONS:   Drink liquids as directed:  Liquids will help flush the contrast dye out of your body  Ask how much liquid to drink after your MRI, and which liquids to drink  Follow up with your healthcare provider as directed:  Write down your questions so you remember to ask them during your visits  Contact your healthcare provider if:   · You have questions or concerns about your condition or care  Seek care immediately or call 911 if:  You have any signs of an allergic reaction to the contrast dye, such as:  · Trouble breathing    · Dizziness or fainting    · Swelling of your mouth or face    · Nausea or vomiting    · Sudden decrease in urination    · A rash, itching, or swollen skin  © 2017 [facility] Information is for End User's use only and may not be sold, redistributed or otherwise used for commercial purposes  All illustrations and images included in CareNotes® are the copyrighted property of A D A M [employer] Inc  or Richard Hahn  The above information is an  only  It is not intended as medical advice for individual conditions or treatments  Talk to your doctor, nurse or pharmacist before following any medical regimen to see if it is safe and effective for you

## 2019-03-01 DIAGNOSIS — K21.9 GASTROESOPHAGEAL REFLUX DISEASE WITHOUT ESOPHAGITIS: ICD-10-CM

## 2019-03-01 RX ORDER — PANTOPRAZOLE SODIUM 40 MG/1
TABLET, DELAYED RELEASE ORAL
Qty: 90 TABLET | Refills: 3 | Status: SHIPPED | OUTPATIENT
Start: 2019-03-01 | End: 2020-03-11

## 2019-03-25 ENCOUNTER — DOCTOR'S OFFICE (OUTPATIENT)
Dept: URBAN - METROPOLITAN AREA CLINIC 136 | Facility: CLINIC | Age: 44
Setting detail: OPHTHALMOLOGY
End: 2019-03-25
Payer: COMMERCIAL

## 2019-03-25 DIAGNOSIS — H25.13: ICD-10-CM

## 2019-03-25 DIAGNOSIS — H40.023: ICD-10-CM

## 2019-03-25 PROCEDURE — 92133 CPTRZD OPH DX IMG PST SGM ON: CPT | Performed by: OPHTHALMOLOGY

## 2019-03-25 PROCEDURE — 92014 COMPRE OPH EXAM EST PT 1/>: CPT | Performed by: OPHTHALMOLOGY

## 2019-03-25 ASSESSMENT — SPHEQUIV_DERIVED
OS_SPHEQUIV: -1.375
OD_SPHEQUIV: -1.375

## 2019-03-25 ASSESSMENT — CONFRONTATIONAL VISUAL FIELD TEST (CVF)
OD_FINDINGS: FULL
OS_FINDINGS: FULL

## 2019-03-25 ASSESSMENT — REFRACTION_CURRENTRX
OS_OVR_VA: 20/
OD_OVR_VA: 20/
OD_SPHERE: -0.50
OD_AXIS: 095
OD_VPRISM_DIRECTION: SV
OS_CYLINDER: -1.75
OS_SPHERE: -0.50
OS_OVR_VA: 20/
OD_OVR_VA: 20/
OD_OVR_VA: 20/
OS_OVR_VA: 20/
OS_VPRISM_DIRECTION: SV
OD_CYLINDER: -2.00
OS_AXIS: 090

## 2019-03-25 ASSESSMENT — REFRACTION_AUTOREFRACTION
OD_AXIS: 090
OS_SPHERE: -0.50
OS_AXIS: 097
OS_CYLINDER: -1.75
OD_CYLINDER: -2.25
OD_SPHERE: -0.25

## 2019-03-25 ASSESSMENT — REFRACTION_MANIFEST
OD_VA3: 20/
OD_VA1: 20/
OD_VA2: 20/
OD_VA3: 20/
OS_VA3: 20/
OS_VA2: 20/
OS_VA3: 20/
OU_VA: 20/
OS_VA1: 20/
OD_VA1: 20/
OS_VA2: 20/
OS_VA1: 20/
OD_VA2: 20/
OU_VA: 20/

## 2019-03-25 ASSESSMENT — VISUAL ACUITY
OD_BCVA: 20/20
OS_BCVA: 20/20

## 2019-04-08 NOTE — TELEPHONE ENCOUNTER
Noted and okay for PPD placement and follow-up reading.   Pt is calling because Dr King had ordered him to have an mri done and he can not get that done because of being  Claustrophobic he was calling to see if something can be prescribed to him   Please call pt back at [phone]

## 2019-04-11 ENCOUNTER — TELEPHONE (OUTPATIENT)
Dept: PAIN MEDICINE | Facility: CLINIC | Age: 44
End: 2019-04-11

## 2019-05-03 ENCOUNTER — OFFICE VISIT (OUTPATIENT)
Dept: NEUROSURGERY | Facility: CLINIC | Age: 44
End: 2019-05-03
Payer: COMMERCIAL

## 2019-05-03 VITALS
BODY MASS INDEX: 25.77 KG/M2 | RESPIRATION RATE: 16 BRPM | WEIGHT: 174 LBS | HEIGHT: 69 IN | SYSTOLIC BLOOD PRESSURE: 126 MMHG | HEART RATE: 60 BPM | DIASTOLIC BLOOD PRESSURE: 90 MMHG | TEMPERATURE: 98 F

## 2019-05-03 DIAGNOSIS — M54.12 CERVICAL RADICULOPATHY: Primary | ICD-10-CM

## 2019-05-03 PROCEDURE — 99214 OFFICE O/P EST MOD 30 MIN: CPT | Performed by: NEUROLOGICAL SURGERY

## 2019-05-03 RX ORDER — LATANOPROST 50 UG/ML
SOLUTION/ DROPS OPHTHALMIC
Refills: 3 | COMMUNITY
Start: 2019-04-15

## 2019-07-30 ENCOUNTER — DOCTOR'S OFFICE (OUTPATIENT)
Dept: URBAN - METROPOLITAN AREA CLINIC 136 | Facility: CLINIC | Age: 44
Setting detail: OPHTHALMOLOGY
End: 2019-07-30
Payer: COMMERCIAL

## 2019-07-30 DIAGNOSIS — H25.13: ICD-10-CM

## 2019-07-30 DIAGNOSIS — H40.023: ICD-10-CM

## 2019-07-30 PROCEDURE — 92133 CPTRZD OPH DX IMG PST SGM ON: CPT | Performed by: OPHTHALMOLOGY

## 2019-07-30 PROCEDURE — 92014 COMPRE OPH EXAM EST PT 1/>: CPT | Performed by: OPHTHALMOLOGY

## 2019-07-30 ASSESSMENT — REFRACTION_MANIFEST
OS_VA3: 20/
OD_VA2: 20/
OS_VA1: 20/
OS_VA1: 20/
OU_VA: 20/
OS_VA2: 20/
OU_VA: 20/
OS_VA3: 20/
OS_VA2: 20/
OD_VA3: 20/
OD_VA2: 20/
OD_VA1: 20/
OD_VA3: 20/
OD_VA1: 20/

## 2019-07-30 ASSESSMENT — REFRACTION_CURRENTRX
OD_OVR_VA: 20/
OS_OVR_VA: 20/
OD_CYLINDER: -2.00
OD_OVR_VA: 20/
OS_SPHERE: -0.50
OS_CYLINDER: -1.75
OS_OVR_VA: 20/
OD_SPHERE: -0.50
OD_AXIS: 095
OD_VPRISM_DIRECTION: SV
OS_OVR_VA: 20/
OD_OVR_VA: 20/
OS_AXIS: 090
OS_VPRISM_DIRECTION: SV

## 2019-07-30 ASSESSMENT — REFRACTION_AUTOREFRACTION
OS_SPHERE: -0.75
OD_AXIS: 091
OD_SPHERE: +0.25
OS_CYLINDER: -2.00
OD_CYLINDER: -2.75
OS_AXIS: 102

## 2019-07-30 ASSESSMENT — SPHEQUIV_DERIVED
OS_SPHEQUIV: -1.75
OD_SPHEQUIV: -1.125

## 2019-07-30 ASSESSMENT — VISUAL ACUITY
OD_BCVA: 20/20
OS_BCVA: 20/20

## 2019-07-30 ASSESSMENT — CONFRONTATIONAL VISUAL FIELD TEST (CVF)
OS_FINDINGS: FULL
OD_FINDINGS: FULL

## 2019-08-21 ENCOUNTER — OFFICE VISIT (OUTPATIENT)
Dept: FAMILY MEDICINE CLINIC | Facility: CLINIC | Age: 44
End: 2019-08-21
Payer: COMMERCIAL

## 2019-08-21 VITALS
RESPIRATION RATE: 15 BRPM | HEIGHT: 69 IN | HEART RATE: 60 BPM | WEIGHT: 174 LBS | TEMPERATURE: 98.3 F | DIASTOLIC BLOOD PRESSURE: 78 MMHG | SYSTOLIC BLOOD PRESSURE: 118 MMHG | BODY MASS INDEX: 25.77 KG/M2

## 2019-08-21 DIAGNOSIS — J06.9 ACUTE UPPER RESPIRATORY INFECTION: Primary | ICD-10-CM

## 2019-08-21 DIAGNOSIS — J20.8 ACUTE BRONCHITIS DUE TO OTHER SPECIFIED ORGANISMS: ICD-10-CM

## 2019-08-21 DIAGNOSIS — E78.2 MIXED HYPERLIPIDEMIA: ICD-10-CM

## 2019-08-21 PROCEDURE — 3008F BODY MASS INDEX DOCD: CPT | Performed by: FAMILY MEDICINE

## 2019-08-21 PROCEDURE — 99214 OFFICE O/P EST MOD 30 MIN: CPT | Performed by: FAMILY MEDICINE

## 2019-08-21 RX ORDER — DEXTROMETHORPHAN HYDROBROMIDE AND PROMETHAZINE HYDROCHLORIDE 15; 6.25 MG/5ML; MG/5ML
5 SOLUTION ORAL 4 TIMES DAILY PRN
Qty: 240 ML | Refills: 0 | Status: SHIPPED | OUTPATIENT
Start: 2019-08-21 | End: 2020-07-21

## 2019-08-21 RX ORDER — AZITHROMYCIN 250 MG/1
TABLET, FILM COATED ORAL
Qty: 6 TABLET | Refills: 0 | Status: SHIPPED | OUTPATIENT
Start: 2019-08-21 | End: 2019-08-25

## 2019-08-21 NOTE — PROGRESS NOTES
Chief Complaint   Patient presents with    Cough     Gotten worse since lastnight   Nasal Congestion     Health Maintenance   Topic Date Due    Pneumococcal Vaccine: Pediatrics (0 to 5 Years) and At-Risk Patients (6 to 59 Years) (1 of 1 - PPSV23) 10/06/1980    BMI: Followup Plan  10/06/1992    Depression Screening PHQ  05/21/2019    INFLUENZA VACCINE  10/21/2019 (Originally 7/1/2019)    BMI: Adult  05/03/2020    DTaP,Tdap,and Td Vaccines (2 - Td) 09/01/2026    Pneumococcal Vaccine: 65+ Years (1 of 2 - PCV13) 10/06/2039    HEPATITIS B VACCINES  Aged Out     Assessment/Plan:    Patient presents with acute URI, acute bronchitis  Will start Zithromax for 5 days, Promethazine DM cough syrup PRN for cough  Rcommended to increase fluid intake  Take Tylenol PRN  Call office if symptoms persist worsen  Encouraged smoking cessation  Hyperlipidemia  Recommended patient to follow a low-cholesterol, low-fat diet  Check lipid panel, CMP, TSH level next month  Schedule follow-up office visit in 9/19 to review blood test results  I have spent 25 minutes with Patient  today in which greater than 50% of this time was spent in counseling/coordination of care regarding Risks and benefits of tx options, Intructions for management, Patient and family education, Importance of tx compliance, Risk factor reductions and Impressions  Diagnoses and all orders for this visit:    Acute upper respiratory infection  -     Promethazine-DM (PHENERGAN-DM) 6 25-15 mg/5 mL oral syrup; Take 5 mL by mouth 4 (four) times a day as needed for cough    Acute bronchitis due to other specified organisms  -     azithromycin (ZITHROMAX) 250 mg tablet; Take 2 tablets today then 1 tablet daily x 4 days  -     Promethazine-DM (PHENERGAN-DM) 6 25-15 mg/5 mL oral syrup; Take 5 mL by mouth 4 (four) times a day as needed for cough    Mixed hyperlipidemia  -     Comprehensive metabolic panel; Future  -     Lipid panel;  Future  - TSH, 3rd generation with Free T4 reflex; Future          Subjective:      Patient ID: Vijay Mahoney is a 39 y o  male  HPI     Patient presents to the office c/o nasal congestion, non- productive cough, scratchy throat for the last few days  Symptoms worsened last night  Patient denies fever, chills  No chest tightness or wheezing  No prior history of asthma  Patient is a current smoker  He tries to quit smoking  Patient has H/o Hyperlipidemia  Last blood done in 10/18  Cholesterol was 239, HDL 45, triglycerides 245,   Patient would like to recheck blood test     The following portions of the patient's history were reviewed and updated as appropriate: allergies, current medications, past medical history, past social history, past surgical history and problem list     Review of Systems   Constitutional: Positive for fatigue  Negative for activity change, appetite change, chills and fever  HENT: Positive for congestion and sore throat  Negative for ear pain, hearing loss, mouth sores, nosebleeds, postnasal drip and trouble swallowing  Eyes: Negative for pain, discharge, redness, itching and visual disturbance  Respiratory: Positive for cough  Negative for chest tightness, shortness of breath and wheezing  Cardiovascular: Negative for chest pain, palpitations and leg swelling  Gastrointestinal: Negative for abdominal pain, diarrhea, nausea and vomiting  Genitourinary: Negative for difficulty urinating, dysuria, flank pain, frequency and hematuria  Musculoskeletal: Negative for arthralgias and joint swelling  Skin: Negative for rash and wound  Neurological: Positive for headaches (mild)  Negative for dizziness  Hematological: Negative            Objective:      /78 (BP Location: Left arm, Patient Position: Sitting, Cuff Size: Adult)   Pulse 60   Temp 98 3 °F (36 8 °C) (Tympanic)   Resp 15   Ht 5' 9" (1 753 m)   Wt 78 9 kg (174 lb)   BMI 25 70 kg/m² Physical Exam   Constitutional: He appears well-developed and well-nourished  HENT:   Head: Normocephalic and atraumatic  Right Ear: External ear normal    Left Ear: External ear normal    Nose: Nose normal    Mild pharyngeal erythema   Eyes: Pupils are equal, round, and reactive to light  Conjunctivae are normal    Cardiovascular: Normal rate, regular rhythm and normal heart sounds  No murmur heard  No BL LE edema   Pulmonary/Chest: Effort normal    Coarse breath sounds BL   Abdominal: Soft  Bowel sounds are normal  There is no tenderness  Musculoskeletal: Normal range of motion  He exhibits no edema, tenderness or deformity  Skin: Skin is warm and dry  No rash noted  Nursing note and vitals reviewed

## 2019-08-21 NOTE — ASSESSMENT & PLAN NOTE
Recommended patient to follow a low-cholesterol, low-fat diet  Check lipid panel, CMP, TSH level next month  Schedule follow-up office visit in 9/19 to review blood test results

## 2019-09-04 DIAGNOSIS — E78.2 MIXED HYPERLIPIDEMIA: Primary | ICD-10-CM

## 2019-09-04 LAB
ALBUMIN SERPL-MCNC: 4.5 G/DL (ref 3.6–5.1)
ALBUMIN/GLOB SERPL: 1.9 (CALC) (ref 1–2.5)
ALP SERPL-CCNC: 99 U/L (ref 40–115)
ALT SERPL-CCNC: 16 U/L (ref 9–46)
AST SERPL-CCNC: 15 U/L (ref 10–40)
BILIRUB SERPL-MCNC: 0.4 MG/DL (ref 0.2–1.2)
BUN SERPL-MCNC: 18 MG/DL (ref 7–25)
BUN/CREAT SERPL: NORMAL (CALC) (ref 6–22)
CALCIUM SERPL-MCNC: 9.4 MG/DL (ref 8.6–10.3)
CHLORIDE SERPL-SCNC: 103 MMOL/L (ref 98–110)
CHOLEST SERPL-MCNC: 212 MG/DL
CHOLEST/HDLC SERPL: 4.9 (CALC)
CO2 SERPL-SCNC: 32 MMOL/L (ref 20–32)
CREAT SERPL-MCNC: 1.08 MG/DL (ref 0.6–1.35)
GLOBULIN SER CALC-MCNC: 2.4 G/DL (CALC) (ref 1.9–3.7)
GLUCOSE SERPL-MCNC: 94 MG/DL (ref 65–99)
HDLC SERPL-MCNC: 43 MG/DL
LDLC SERPL CALC-MCNC: 131 MG/DL (CALC)
NONHDLC SERPL-MCNC: 169 MG/DL (CALC)
POTASSIUM SERPL-SCNC: 5.1 MMOL/L (ref 3.5–5.3)
PROT SERPL-MCNC: 6.9 G/DL (ref 6.1–8.1)
SL AMB EGFR AFRICAN AMERICAN: 96 ML/MIN/1.73M2
SL AMB EGFR NON AFRICAN AMERICAN: 83 ML/MIN/1.73M2
SODIUM SERPL-SCNC: 140 MMOL/L (ref 135–146)
TRIGL SERPL-MCNC: 249 MG/DL
TSH SERPL-ACNC: 1.26 MIU/L (ref 0.4–4.5)

## 2019-10-03 ENCOUNTER — CLINICAL SUPPORT (OUTPATIENT)
Dept: PAIN MEDICINE | Facility: CLINIC | Age: 44
End: 2019-10-03
Payer: COMMERCIAL

## 2019-10-03 VITALS
TEMPERATURE: 97.7 F | BODY MASS INDEX: 25.92 KG/M2 | WEIGHT: 175 LBS | HEIGHT: 69 IN | DIASTOLIC BLOOD PRESSURE: 82 MMHG | SYSTOLIC BLOOD PRESSURE: 121 MMHG | HEART RATE: 59 BPM

## 2019-10-03 DIAGNOSIS — M79.18 MYOFASCIAL PAIN SYNDROME: ICD-10-CM

## 2019-10-03 DIAGNOSIS — M47.812 SPONDYLOSIS OF CERVICAL SPINE WITHOUT MYELOPATHY: Primary | ICD-10-CM

## 2019-10-03 DIAGNOSIS — M54.2 NECK PAIN: ICD-10-CM

## 2019-10-03 DIAGNOSIS — M48.02 NEURAL FORAMINAL STENOSIS OF CERVICAL SPINE: ICD-10-CM

## 2019-10-03 PROCEDURE — 99214 OFFICE O/P EST MOD 30 MIN: CPT | Performed by: ANESTHESIOLOGY

## 2019-10-03 RX ORDER — TIZANIDINE 4 MG/1
4 TABLET ORAL EVERY 8 HOURS PRN
Qty: 90 TABLET | Refills: 1 | Status: SHIPPED | OUTPATIENT
Start: 2019-10-03 | End: 2020-07-21

## 2019-10-03 NOTE — PROGRESS NOTES
Assessment  1  Spondylosis of cervical spine without myelopathy    2  Myofascial pain syndrome    3  Neural foraminal stenosis of cervical spine    4  Neck pain        Plan  26-year-old male returning for follow-up of neck pain and cervical radiculopathy secondary to spondylosis and foraminal stenosis  The patient states that the radicular symptoms in his left arm have nearly resolved, however he is left with a significant amount of persistent neck pain which seems to be myofascial and facet mediated nature  The patient has had cervical epidural steroid injections in the past without any relief  He is currently utilizing medical marijuana with mild to moderate relief  He has tried muscle relaxants and NSAIDs in the past without much relief  He has also tried physical therapy without relief  1  I will schedule the patient for left C4, C5, and C6 medial branch blocks to address the facet mediated component of his neck pain  If the patient has a favorable result x2 we could proceed with RFA for longer lasting relief  The diagnostic nature of these blocks were discussed in detail using diagrams and models with the patient and he was also provided literature regarding medial branch blocks and rhizotomy to review  The patient wished to proceed  2  I will prescribe a trial of tizanidine 4 mg q 8 hours p r n  For his myofascial pain  3  We will avoid NSAIDs secondary to marijuana use  4  I will follow up the patient pending results of medial branch blocks       Complete risks and benefits including bleeding, infection, tissue reaction, nerve injury and allergic reaction were discussed  The approach was demonstrated using models and literature was provided  Verbal and written consent was obtained  My impressions and treatment recommendations were discussed in detail with the patient who verbalized understanding and had no further questions  Discharge instructions were provided   I personally saw and examined the patient and I agree with the above discussed plan of care  No orders of the defined types were placed in this encounter  No orders of the defined types were placed in this encounter  History of Present Illness    Junior Ann is a 39 y o  male returning for follow-up of neck pain and cervical radiculopathy secondary to spondylosis and foraminal stenosis  The patient states that the radicular symptoms in his left arm have nearly resolved, however he is left with a significant amount of persistent neck pain  He denies any bladder or bowel incontinence or balance issues  He denies any right upper extremity symptoms  The patient rates his pain an 8/10 and the pain is worse in the morning  The pain is constant and described sharp and cramping  The pain is worse with exercise and bending his neck  The pain is alleviated with relaxation  The patient has had cervical epidural steroid injections in the past without any relief  He is currently utilizing medical marijuana with mild to moderate relief  He has tried muscle relaxants and NSAIDs in the past without much relief  He has also tried physical therapy without relief  I have personally reviewed and/or updated the patient's past medical history, past surgical history, family history, social history, current medications, allergies, and vital signs today  Other than as stated above, the patient denies any interval changes in medications, medical condition, mental condition, symptoms, or allergies since the last office visit  Review of Systems   Constitutional: Negative for fever and unexpected weight change  HENT: Negative for trouble swallowing  Eyes: Negative for visual disturbance  Respiratory: Negative for shortness of breath and wheezing  Cardiovascular: Negative for chest pain and palpitations  Gastrointestinal: Negative for constipation, diarrhea, nausea and vomiting     Endocrine: Negative for cold intolerance, heat intolerance and polydipsia  Genitourinary: Negative for difficulty urinating and frequency  Musculoskeletal: Negative for arthralgias, gait problem, joint swelling and myalgias  Decreased ROM, pain in extremity   Skin: Negative for rash  Neurological: Negative for dizziness, seizures, syncope, weakness and headaches  Hematological: Does not bruise/bleed easily  Psychiatric/Behavioral: Negative for dysphoric mood  All other systems reviewed and are negative  Patient Active Problem List   Diagnosis    Cervical spondylosis    Cervical stenosis of spine    Chronic neck and back pain    Myofascial pain syndrome    Cervical radiculopathy    Chronic bilateral low back pain without sciatica    Chronic right-sided thoracic back pain    Cigarette nicotine dependence without complication    Environmental and seasonal allergies    GERD without esophagitis    Hyperlipidemia    Neural foraminal stenosis of cervical spine    Seasonal allergies    Ho's esophagus without dysplasia    Preprocedural general physical examination    Well adult exam    Acute upper respiratory infection    Acute bronchitis due to other specified organisms       Past Medical History:   Diagnosis Date    Allergic     Ho's esophagus     Cervical disc disorder     Eczema     GERD (gastroesophageal reflux disease)     Hyperlipidemia     Migraine     Streptococcal pharyngitis        Past Surgical History:   Procedure Laterality Date    APPENDECTOMY         Family History   Problem Relation Age of Onset    No Known Problems Mother        Social History     Occupational History    Not on file   Tobacco Use    Smoking status: Current Every Day Smoker     Packs/day: 0 25    Smokeless tobacco: Never Used   Substance and Sexual Activity    Alcohol use: Yes     Alcohol/week: 6 0 standard drinks     Types: 6 Cans of beer per week     Comment: occasional    Drug use: Yes     Types:  Other, Marijuana Comment: medical marijuana    Sexual activity: Not on file       Current Outpatient Medications on File Prior to Visit   Medication Sig    cetirizine (ZyrTEC) 10 MG chewable tablet Chew 1 tablet (10 mg total) daily    latanoprost (XALATAN) 0 005 % ophthalmic solution INSTILL 1 DROP INTO BOTH EYES AT BEDTIME, STOCK SIZE 2 5ML    NON FORMULARY MEDICAL MARIJUANA    pantoprazole (PROTONIX) 40 mg tablet TAKE 1 TABLET BY MOUTH ONCE DAILY    Promethazine-DM (PHENERGAN-DM) 6 25-15 mg/5 mL oral syrup Take 5 mL by mouth 4 (four) times a day as needed for cough (Patient not taking: Reported on 10/3/2019)     No current facility-administered medications on file prior to visit  No Known Allergies    Physical Exam    Temp 97 7 °F (36 5 °C) (Oral)   Ht 5' 9" (1 753 m)   Wt 79 4 kg (175 lb)   BMI 25 84 kg/m²     Constitutional: normal, well developed, well nourished, alert, in no distress and non-toxic and no overt pain behavior  Eyes: anicteric  HEENT: grossly intact  Neck: supple, symmetric, trachea midline and no masses   Pulmonary:even and unlabored  Cardiovascular:No edema or pitting edema present  Skin:Normal without rashes or lesions and well hydrated  Psychiatric:Mood and affect appropriate  Neurologic:Cranial Nerves II-XII grossly intact  Musculoskeletal:normal gait  Bilateral cervical paraspinals tender to palpation and ropy in texture  Bilateral upper extremity strength 5/5 in all muscle groups  Sensation intact to light touch in C5 through T1 dermatomes bilaterally  Negative Spurling's bilaterally  Imaging        PACS Images      Show images for MRI cervical spine wo contrast   Study Result     MRI CERVICAL SPINE WITHOUT CONTRAST     INDICATION: [id number]: Radiculopathy, cervical region     Neck pain     COMPARISON:  None      TECHNIQUE:  Sagittal T1, sagittal T2, sagittal inversion recovery, axial T2, axial  2D merge  patient prepared for and monitored during this exam by anesthesia personnel      IMAGE QUALITY:  Diagnostic     FINDINGS:     ALIGNMENT:  Normal alignment of the cervical spine  No compression fracture  No subluxation  No scoliosis  Minor loss of disc height C4-5, C5-6 and C6-C7      MARROW SIGNAL:  Normal marrow signal is identified within the visualized bony structures  No discrete marrow lesion      CERVICAL AND VISUALIZED THORACIC CORD:  Normal signal within the visualized cord      PREVERTEBRAL AND PARASPINAL SOFT TISSUES:  Normal      VISUALIZED POSTERIOR FOSSA:  The visualized posterior fossa demonstrates no abnormal signal      CERVICAL DISC SPACES:     C2-C3:  Normal      C3-C4:  Normal      C4-C5:  Minimal facet arthrosis     C5-C6:  Slight reduction disc height, circumferential bulge with small marginal osteophytes  Minor bilateral uncinate arthrosis  Minor right foraminal stenosis      C6-C7:  Decreased disc height, circumferential bulge, minor bilateral facet and uncinate arthrosis    Moderate bilateral foraminal stenosis      C7-T1:  Normal      UPPER THORACIC DISC SPACES:  Normal      IMPRESSION:     Minor, noncompressive degenerative changes of the cervical spine most pronounced at C5-C6 and C6-C7               Workstation performed: [id number]

## 2019-10-16 ENCOUNTER — HOSPITAL ENCOUNTER (OUTPATIENT)
Dept: RADIOLOGY | Facility: CLINIC | Age: 44
Discharge: HOME | End: 2019-10-16
Attending: ANESTHESIOLOGY | Admitting: ANESTHESIOLOGY
Payer: COMMERCIAL

## 2019-10-16 VITALS
RESPIRATION RATE: 18 BRPM | TEMPERATURE: 97.7 F | SYSTOLIC BLOOD PRESSURE: 128 MMHG | OXYGEN SATURATION: 99 % | HEART RATE: 64 BPM | DIASTOLIC BLOOD PRESSURE: 91 MMHG

## 2019-10-16 DIAGNOSIS — M47.812 SPONDYLOSIS OF CERVICAL SPINE WITHOUT MYELOPATHY: ICD-10-CM

## 2019-10-16 PROCEDURE — 64490 INJ PARAVERT F JNT C/T 1 LEV: CPT | Performed by: ANESTHESIOLOGY

## 2019-10-16 PROCEDURE — 64491 INJ PARAVERT F JNT C/T 2 LEV: CPT | Performed by: ANESTHESIOLOGY

## 2019-10-16 RX ORDER — LIDOCAINE HYDROCHLORIDE 10 MG/ML
5 INJECTION, SOLUTION EPIDURAL; INFILTRATION; INTRACAUDAL; PERINEURAL ONCE
Status: COMPLETED | OUTPATIENT
Start: 2019-10-16 | End: 2019-10-16

## 2019-10-16 RX ADMIN — LIDOCAINE HYDROCHLORIDE 2 ML: 20 INJECTION, SOLUTION EPIDURAL; INFILTRATION; INTRACAUDAL; PERINEURAL at 11:12

## 2019-10-16 RX ADMIN — LIDOCAINE HYDROCHLORIDE 5 ML: 10 INJECTION, SOLUTION EPIDURAL; INFILTRATION; INTRACAUDAL; PERINEURAL at 11:12

## 2019-10-16 NOTE — DISCHARGE INSTRUCTIONS

## 2019-10-16 NOTE — H&P
History of Present Illness: The patient is a 38 y o  male who presents with complaints of neck pain      Patient Active Problem List   Diagnosis    Spondylosis of cervical spine without myelopathy    Cervical stenosis of spine    Chronic neck and back pain    Myofascial pain syndrome    Cervical radiculopathy    Chronic bilateral low back pain without sciatica    Chronic right-sided thoracic back pain    Cigarette nicotine dependence without complication    Environmental and seasonal allergies    GERD without esophagitis    Hyperlipidemia    Neural foraminal stenosis of cervical spine    Seasonal allergies    Ho's esophagus without dysplasia    Preprocedural general physical examination    Well adult exam    Acute upper respiratory infection    Acute bronchitis due to other specified organisms    Neck pain       Past Medical History:   Diagnosis Date    Allergic     Ho's esophagus     Cervical disc disorder     Eczema     GERD (gastroesophageal reflux disease)     Hyperlipidemia     Migraine     Streptococcal pharyngitis        Past Surgical History:   Procedure Laterality Date    APPENDECTOMY           Current Outpatient Medications:     cetirizine (ZyrTEC) 10 MG chewable tablet, Chew 1 tablet (10 mg total) daily, Disp: 30 tablet, Rfl: 3    latanoprost (XALATAN) 0 005 % ophthalmic solution, INSTILL 1 DROP INTO BOTH EYES AT BEDTIME, STOCK SIZE 2 5ML, Disp: , Rfl: 3    NON FORMULARY, MEDICAL MARIJUANA, Disp: , Rfl:     pantoprazole (PROTONIX) 40 mg tablet, TAKE 1 TABLET BY MOUTH ONCE DAILY, Disp: 90 tablet, Rfl: 3    Promethazine-DM (PHENERGAN-DM) 6 25-15 mg/5 mL oral syrup, Take 5 mL by mouth 4 (four) times a day as needed for cough (Patient not taking: Reported on 10/3/2019), Disp: 240 mL, Rfl: 0    tiZANidine (ZANAFLEX) 4 mg tablet, Take 1 tablet (4 mg total) by mouth every 8 (eight) hours as needed for muscle spasms, Disp: 90 tablet, Rfl: 1    No Known Allergies    Physical Exam:   Vitals:    10/16/19 1042   BP: 119/76   Pulse: 84   Resp: 20   Temp: 97 7 °F (36 5 °C)   SpO2: 98%     General: Awake, Alert, Oriented x 3, Mood and affect appropriate  Respiratory: Respirations even and unlabored  Cardiovascular: Peripheral pulses intact; no edema  Musculoskeletal Exam:  Bilateral cervical paraspinals tender to palpation more so on the right than the left  ASA Score: 2    Patient/Chart Verification  Patient ID Verified: Verbal  ID Band Applied: No  Consents Confirmed: Procedural  H&P( within 30 days) Verified: To be obtained in the Pre-Procedure area  Interval H&P(within 24 hr) Complete (required for Outpatients and Surgery Admit only): To be obtained in the Pre-Procedure area  Allergies Reviewed: Yes  Anticoag/NSAID held?: No  Currently on antibiotics?: No  Pre-op Lab/Test Results Available: N/A    Assessment:   1  Spondylosis of cervical spine without myelopathy        Plan:  Right C4, C5, and C6 medial branch block 1

## 2019-10-28 ENCOUNTER — TELEPHONE (OUTPATIENT)
Dept: RADIOLOGY | Facility: CLINIC | Age: 44
End: 2019-10-28

## 2019-10-28 NOTE — TELEPHONE ENCOUNTER
Wed 11/06/19 arr at 09:45  Pt had a flu shot over a week ago  Went over pre procedure instructions, NPO 1 hr prior, if sick or on abx needs to call to rs, wear loose, comf clothing- no buttons/zippers, needs , went over MBB instructions  Pt verbalized understanding

## 2019-10-28 NOTE — TELEPHONE ENCOUNTER
Per Dr Korin Hugo, pt had favorable response to: RT C4, C5, C6 MBB#1 (CHANGED FROM LT SIDE BY ATILIO ON 10/16/16)  OK to schedule: RT MBB#2  Date of last MBB: 10/16/19

## 2019-11-06 ENCOUNTER — TELEPHONE (OUTPATIENT)
Dept: RADIOLOGY | Facility: CLINIC | Age: 44
End: 2019-11-06

## 2019-11-06 ENCOUNTER — HOSPITAL ENCOUNTER (OUTPATIENT)
Dept: RADIOLOGY | Facility: CLINIC | Age: 44
Discharge: HOME | End: 2019-11-06
Admitting: ANESTHESIOLOGY
Payer: COMMERCIAL

## 2019-11-06 VITALS
DIASTOLIC BLOOD PRESSURE: 93 MMHG | RESPIRATION RATE: 20 BRPM | SYSTOLIC BLOOD PRESSURE: 138 MMHG | TEMPERATURE: 98 F | OXYGEN SATURATION: 99 % | HEART RATE: 64 BPM

## 2019-11-06 DIAGNOSIS — M47.812 CERVICAL SPONDYLOSIS WITHOUT MYELOPATHY: ICD-10-CM

## 2019-11-06 PROCEDURE — 64490 INJ PARAVERT F JNT C/T 1 LEV: CPT | Performed by: ANESTHESIOLOGY

## 2019-11-06 PROCEDURE — 64491 INJ PARAVERT F JNT C/T 2 LEV: CPT | Performed by: ANESTHESIOLOGY

## 2019-11-06 RX ORDER — LIDOCAINE HYDROCHLORIDE 10 MG/ML
5 INJECTION, SOLUTION EPIDURAL; INFILTRATION; INTRACAUDAL; PERINEURAL ONCE
Status: COMPLETED | OUTPATIENT
Start: 2019-11-06 | End: 2019-11-06

## 2019-11-06 RX ORDER — BUPIVACAINE HYDROCHLORIDE 5 MG/ML
30 INJECTION, SOLUTION EPIDURAL; INTRACAUDAL ONCE
Status: COMPLETED | OUTPATIENT
Start: 2019-11-06 | End: 2019-11-06

## 2019-11-06 RX ADMIN — LIDOCAINE HYDROCHLORIDE 5 ML: 10 INJECTION, SOLUTION EPIDURAL; INFILTRATION; INTRACAUDAL; PERINEURAL at 10:12

## 2019-11-06 RX ADMIN — BUPIVACAINE HYDROCHLORIDE 1.5 ML: 5 INJECTION, SOLUTION EPIDURAL; INTRACAUDAL at 10:17

## 2019-11-06 NOTE — DISCHARGE INSTRUCTIONS

## 2019-11-06 NOTE — H&P
History of Present Illness: The patient is a 38 y o  male who presents with complaints of neck pain      Patient Active Problem List   Diagnosis    Spondylosis of cervical spine without myelopathy    Cervical stenosis of spine    Chronic neck and back pain    Myofascial pain syndrome    Cervical radiculopathy    Chronic bilateral low back pain without sciatica    Chronic right-sided thoracic back pain    Cigarette nicotine dependence without complication    Environmental and seasonal allergies    GERD without esophagitis    Hyperlipidemia    Neural foraminal stenosis of cervical spine    Seasonal allergies    Ho's esophagus without dysplasia    Preprocedural general physical examination    Well adult exam    Acute upper respiratory infection    Acute bronchitis due to other specified organisms    Neck pain       Past Medical History:   Diagnosis Date    Allergic     Ho's esophagus     Cervical disc disorder     Eczema     GERD (gastroesophageal reflux disease)     Hyperlipidemia     Migraine     Streptococcal pharyngitis        Past Surgical History:   Procedure Laterality Date    APPENDECTOMY           Current Outpatient Medications:     cetirizine (ZyrTEC) 10 MG chewable tablet, Chew 1 tablet (10 mg total) daily, Disp: 30 tablet, Rfl: 3    latanoprost (XALATAN) 0 005 % ophthalmic solution, INSTILL 1 DROP INTO BOTH EYES AT BEDTIME, STOCK SIZE 2 5ML, Disp: , Rfl: 3    NON FORMULARY, MEDICAL MARIJUANA, Disp: , Rfl:     pantoprazole (PROTONIX) 40 mg tablet, TAKE 1 TABLET BY MOUTH ONCE DAILY, Disp: 90 tablet, Rfl: 3    Promethazine-DM (PHENERGAN-DM) 6 25-15 mg/5 mL oral syrup, Take 5 mL by mouth 4 (four) times a day as needed for cough (Patient not taking: Reported on 10/3/2019), Disp: 240 mL, Rfl: 0    tiZANidine (ZANAFLEX) 4 mg tablet, Take 1 tablet (4 mg total) by mouth every 8 (eight) hours as needed for muscle spasms, Disp: 90 tablet, Rfl: 1    No Known Allergies    Physical Exam:   Vitals:    11/06/19 0944   BP: 117/82   Pulse: 63   Resp: 20   Temp: 98 °F (36 7 °C)   SpO2: 97%     General: Awake, Alert, Oriented x 3, Mood and affect appropriate  Respiratory: Respirations even and unlabored  Cardiovascular: Peripheral pulses intact; no edema  Musculoskeletal Exam:  Right cervical paraspinals tender to palpation  ASA Score: 2    Patient/Chart Verification  Patient ID Verified: Verbal  ID Band Applied: No  Consents Confirmed: Procedural  H&P( within 30 days) Verified: To be obtained in the Pre-Procedure area  Interval H&P(within 24 hr) Complete (required for Outpatients and Surgery Admit only): To be obtained in the Pre-Procedure area  Allergies Reviewed: Yes  Anticoag/NSAID held?: No  Currently on antibiotics?: No  Pre-op Lab/Test Results Available: N/A    Assessment:   1   Cervical spondylosis without myelopathy        Plan: RT C4, C5, C6 MBB#2

## 2019-11-11 NOTE — TELEPHONE ENCOUNTER
Per Dr Foy Saint, pt had favorable response to: RT C4, C5, C6 MBB#2  OK to schedule: RT C4-6 RFA  Date of last MBB: 11/06/19

## 2019-11-12 NOTE — TELEPHONE ENCOUNTER
Called pt, scheduled RT C4, C5, C6 RFA on Wed 11/27/19 arr at 15:10  Pt already had flu shot more than 1 week ago  Went over pre procedure instructions, NPO 1 hr prior, if sick or on abx needs to call to rs, wear loose, comf clothing like Tshirt- no jewelry, needs   Pt verbalized understanding

## 2019-11-27 ENCOUNTER — TELEPHONE (OUTPATIENT)
Dept: RADIOLOGY | Facility: CLINIC | Age: 44
End: 2019-11-27

## 2019-11-27 ENCOUNTER — HOSPITAL ENCOUNTER (OUTPATIENT)
Dept: RADIOLOGY | Facility: CLINIC | Age: 44
Discharge: HOME | End: 2019-11-27
Payer: COMMERCIAL

## 2019-11-27 VITALS
DIASTOLIC BLOOD PRESSURE: 87 MMHG | RESPIRATION RATE: 16 BRPM | HEART RATE: 61 BPM | TEMPERATURE: 98.3 F | SYSTOLIC BLOOD PRESSURE: 142 MMHG | OXYGEN SATURATION: 98 %

## 2019-11-27 DIAGNOSIS — M47.812 CERVICAL SPONDYLOSIS WITHOUT MYELOPATHY: ICD-10-CM

## 2019-11-27 PROCEDURE — 64633 DESTROY CERV/THOR FACET JNT: CPT | Performed by: ANESTHESIOLOGY

## 2019-11-27 PROCEDURE — 64634 DESTROY C/TH FACET JNT ADDL: CPT | Performed by: ANESTHESIOLOGY

## 2019-11-27 RX ORDER — LIDOCAINE HYDROCHLORIDE 10 MG/ML
5 INJECTION, SOLUTION EPIDURAL; INFILTRATION; INTRACAUDAL; PERINEURAL ONCE
Status: COMPLETED | OUTPATIENT
Start: 2019-11-27 | End: 2019-11-27

## 2019-11-27 RX ORDER — BUPIVACAINE HYDROCHLORIDE 5 MG/ML
30 INJECTION, SOLUTION EPIDURAL; INTRACAUDAL ONCE
Status: COMPLETED | OUTPATIENT
Start: 2019-11-27 | End: 2019-11-27

## 2019-11-27 RX ADMIN — LIDOCAINE HYDROCHLORIDE 3 ML: 20 INJECTION, SOLUTION EPIDURAL; INFILTRATION; INTRACAUDAL; PERINEURAL at 15:38

## 2019-11-27 RX ADMIN — LIDOCAINE HYDROCHLORIDE 5 ML: 10 INJECTION, SOLUTION EPIDURAL; INFILTRATION; INTRACAUDAL; PERINEURAL at 15:31

## 2019-11-27 RX ADMIN — LIDOCAINE HYDROCHLORIDE 1 ML: 10 INJECTION, SOLUTION EPIDURAL; INFILTRATION; INTRACAUDAL; PERINEURAL at 15:31

## 2019-11-27 RX ADMIN — BUPIVACAINE HYDROCHLORIDE 3 ML: 5 INJECTION, SOLUTION EPIDURAL; INTRACAUDAL at 15:41

## 2019-11-27 NOTE — H&P
History of Present Illness: The patient is a 38 y o  male who presents with complaints of neck pain      Patient Active Problem List   Diagnosis    Cervical spondylosis without myelopathy    Cervical stenosis of spine    Chronic neck and back pain    Myofascial pain syndrome    Cervical radiculopathy    Chronic bilateral low back pain without sciatica    Chronic right-sided thoracic back pain    Cigarette nicotine dependence without complication    Environmental and seasonal allergies    GERD without esophagitis    Hyperlipidemia    Neural foraminal stenosis of cervical spine    Seasonal allergies    Ho's esophagus without dysplasia    Preprocedural general physical examination    Well adult exam    Acute upper respiratory infection    Acute bronchitis due to other specified organisms    Neck pain       Past Medical History:   Diagnosis Date    Allergic     Ho's esophagus     Cervical disc disorder     Eczema     GERD (gastroesophageal reflux disease)     Hyperlipidemia     Migraine     Streptococcal pharyngitis        Past Surgical History:   Procedure Laterality Date    APPENDECTOMY           Current Outpatient Medications:     cetirizine (ZyrTEC) 10 MG chewable tablet, Chew 1 tablet (10 mg total) daily, Disp: 30 tablet, Rfl: 3    latanoprost (XALATAN) 0 005 % ophthalmic solution, INSTILL 1 DROP INTO BOTH EYES AT BEDTIME, STOCK SIZE 2 5ML, Disp: , Rfl: 3    NON FORMULARY, MEDICAL MARIJUANA, Disp: , Rfl:     pantoprazole (PROTONIX) 40 mg tablet, TAKE 1 TABLET BY MOUTH ONCE DAILY, Disp: 90 tablet, Rfl: 3    Promethazine-DM (PHENERGAN-DM) 6 25-15 mg/5 mL oral syrup, Take 5 mL by mouth 4 (four) times a day as needed for cough (Patient not taking: Reported on 10/3/2019), Disp: 240 mL, Rfl: 0    tiZANidine (ZANAFLEX) 4 mg tablet, Take 1 tablet (4 mg total) by mouth every 8 (eight) hours as needed for muscle spasms, Disp: 90 tablet, Rfl: 1    No Known Allergies    Physical Exam: Vitals:    11/27/19 1507   BP: 121/80   Pulse: 61   Resp: 18   Temp: 98 3 °F (36 8 °C)   SpO2: 96%     General: Awake, Alert, Oriented x 3, Mood and affect appropriate  Respiratory: Respirations even and unlabored  Cardiovascular: Peripheral pulses intact; no edema  Musculoskeletal Exam:  Right cervical paraspinals tender to palpation  ASA Score: 2    Patient/Chart Verification  Patient ID Verified: Verbal  ID Band Applied: No  Consents Confirmed: Procedural, To be obtained in the Pre-Procedure area  H&P( within 30 days) Verified: To be obtained in the Pre-Procedure area  Allergies Reviewed: Yes  Anticoag/NSAID held?: No  Currently on antibiotics?: No    Assessment:   1   Cervical spondylosis without myelopathy        Plan: RT C4, C5, C6 RFA

## 2019-11-27 NOTE — DISCHARGE INSTR - LAB

## 2019-11-29 NOTE — TELEPHONE ENCOUNTER
RONALDO patient, states he is doing great! Some soreness noted at the procedure site  Patient used some ice for pain relief  Denies any redness, drainage, swelling, sun-burning sensation, headaches, fever >100  Next ovs with [address] on 1/8/2020

## 2020-01-08 ENCOUNTER — OFFICE VISIT (OUTPATIENT)
Dept: PAIN MEDICINE | Facility: CLINIC | Age: 45
End: 2020-01-08
Payer: COMMERCIAL

## 2020-01-08 VITALS
DIASTOLIC BLOOD PRESSURE: 82 MMHG | WEIGHT: 175 LBS | HEART RATE: 58 BPM | SYSTOLIC BLOOD PRESSURE: 122 MMHG | BODY MASS INDEX: 25.92 KG/M2 | HEIGHT: 69 IN

## 2020-01-08 DIAGNOSIS — M47.812 CERVICAL SPONDYLOSIS WITHOUT MYELOPATHY: Primary | ICD-10-CM

## 2020-01-08 PROCEDURE — 99213 OFFICE O/P EST LOW 20 MIN: CPT | Performed by: NURSE PRACTITIONER

## 2020-01-08 NOTE — PROGRESS NOTES
Assessment:  1  Cervical spondylosis without myelopathy        Plan:  1  Patient states he has noticed a drastic improvement of his right-sided neck pain from right C4-6 RFA completed on November 27, 2019  He does not feel he is experiencing severe enough left-sided neck pain to move forward with left C3-6 medial branch blocks at this time, however understands he can always call the office if his pain worsens to schedule this procedure in the future  We can repeat right C4-6 RFA every 6-9 months as needed  2  The patient is no longer taking tizanidine as he does not feel this medication is needed  We will discontinue use at this time  3  Patient will continue with his home exercise program  4  I will avoid opioid medications secondary to marijuana use  5  At this point, the patient will follow up on an as-needed basis  The patient was advised to contact the office should their symptoms worsen in the interim  The patient was agreeable and verbalized an understanding  [employer] software was used to dictate this note  It may contain errors with dictating incorrect words or incorrect spelling  Please contact the provider directly with any questions  History of Present Illness: The patient is a 38 y o  male last seen on 10/3/19 who presents for a follow up office visit in regards to chronic neck pain secondary to cervical spondylosis and stenosis  The patient denies any radicular symptoms into the upper extremities, bowel or bladder incontinence, or balance issues  He is status post right C4-6 RFA completed on November 27, 2019 with Dr Alexis Triana has reported a significant improvement of his neck pain from the procedure  He is experiencing minimal left-sided neck pain at this time  He states he is no longer taking any medication since the pain has resolved  The patient currently rates his pain a 2/10 on the numeric pain rating scale    He states his pain is occasional in nature and follows no particular pattern throughout the day  Characterizes the pain as dull aching  I have personally reviewed and/or updated the patient's past medical history, past surgical history, family history, social history, current medications, allergies, and vital signs today  Review of Systems:    Review of Systems   Respiratory: Negative for shortness of breath  Cardiovascular: Negative for chest pain  Gastrointestinal: Negative for constipation, diarrhea, nausea and vomiting  Musculoskeletal: Negative for arthralgias, gait problem, joint swelling and myalgias  Skin: Negative for rash  Neurological: Negative for dizziness, seizures and weakness  All other systems reviewed and are negative  Past Medical History:   Diagnosis Date    Allergic     Ho's esophagus     Cervical disc disorder     Eczema     GERD (gastroesophageal reflux disease)     Hyperlipidemia     Migraine     Streptococcal pharyngitis        Past Surgical History:   Procedure Laterality Date    APPENDECTOMY         Family History   Problem Relation Age of Onset    No Known Problems Mother        Social History     Occupational History    Not on file   Tobacco Use    Smoking status: Current Every Day Smoker     Packs/day: 0 25    Smokeless tobacco: Never Used   Substance and Sexual Activity    Alcohol use: Yes     Alcohol/week: 6 0 standard drinks     Types: 6 Cans of beer per week     Comment: occasional    Drug use: Yes     Types:  Other, Marijuana     Comment: medical marijuana    Sexual activity: Not on file         Current Outpatient Medications:     NON FORMULARY, MEDICAL MARIJUANA, Disp: , Rfl:     pantoprazole (PROTONIX) 40 mg tablet, TAKE 1 TABLET BY MOUTH ONCE DAILY, Disp: 90 tablet, Rfl: 3    cetirizine (ZyrTEC) 10 MG chewable tablet, Chew 1 tablet (10 mg total) daily (Patient not taking: Reported on 1/8/2020), Disp: 30 tablet, Rfl: 3    latanoprost (XALATAN) 0 005 % ophthalmic solution, INSTILL 1 DROP INTO BOTH EYES AT BEDTIME, STOCK SIZE 2 5ML, Disp: , Rfl: 3    Promethazine-DM (PHENERGAN-DM) 6 25-15 mg/5 mL oral syrup, Take 5 mL by mouth 4 (four) times a day as needed for cough (Patient not taking: Reported on 10/3/2019), Disp: 240 mL, Rfl: 0    tiZANidine (ZANAFLEX) 4 mg tablet, Take 1 tablet (4 mg total) by mouth every 8 (eight) hours as needed for muscle spasms (Patient not taking: Reported on 1/8/2020), Disp: 90 tablet, Rfl: 1    No Known Allergies    Physical Exam:    /82   Pulse 58   Ht 5' 9" (1 753 m)   Wt 79 4 kg (175 lb)   BMI 25 84 kg/m²     Constitutional:normal, well developed, well nourished, alert, in no distress and non-toxic and no overt pain behavior  Eyes:anicteric  HEENT:grossly intact  Neck:supple, symmetric, trachea midline and no masses   Pulmonary:even and unlabored  Cardiovascular:No edema or pitting edema present  Skin:Normal without rashes or lesions and well hydrated  Psychiatric:Mood and affect appropriate  Neurologic:Cranial Nerves II-XII grossly intact  Musculoskeletal:normal gait      Imaging  No orders to display   MRI CERVICAL SPINE WITHOUT CONTRAST     INDICATION: M54 12: Radiculopathy, cervical region  Neck pain     COMPARISON:  None      TECHNIQUE:  Sagittal T1, sagittal T2, sagittal inversion recovery, axial T2, axial  2D merge  patient prepared for and monitored during this exam by anesthesia personnel      IMAGE QUALITY:  Diagnostic     FINDINGS:     ALIGNMENT:  Normal alignment of the cervical spine  No compression fracture  No subluxation  No scoliosis  Minor loss of disc height C4-5, C5-6 and C6-C7      MARROW SIGNAL:  Normal marrow signal is identified within the visualized bony structures    No discrete marrow lesion      CERVICAL AND VISUALIZED THORACIC CORD:  Normal signal within the visualized cord      PREVERTEBRAL AND PARASPINAL SOFT TISSUES:  Normal      VISUALIZED POSTERIOR FOSSA:  The visualized posterior fossa demonstrates no abnormal signal      CERVICAL DISC SPACES:     C2-C3:  Normal      C3-C4:  Normal      C4-C5:  Minimal facet arthrosis     C5-C6:  Slight reduction disc height, circumferential bulge with small marginal osteophytes  Minor bilateral uncinate arthrosis  Minor right foraminal stenosis      C6-C7:  Decreased disc height, circumferential bulge, minor bilateral facet and uncinate arthrosis  Moderate bilateral foraminal stenosis      C7-T1:  Normal      UPPER THORACIC DISC SPACES:  Normal      IMPRESSION:     Minor, noncompressive degenerative changes of the cervical spine most pronounced at C5-C6 and C6-C7  No orders of the defined types were placed in this encounter

## 2020-02-04 ENCOUNTER — DOCTOR'S OFFICE (OUTPATIENT)
Dept: URBAN - METROPOLITAN AREA CLINIC 136 | Facility: CLINIC | Age: 45
Setting detail: OPHTHALMOLOGY
End: 2020-02-04
Payer: COMMERCIAL

## 2020-02-04 DIAGNOSIS — H40.023: ICD-10-CM

## 2020-02-04 DIAGNOSIS — H40.033: ICD-10-CM

## 2020-02-04 DIAGNOSIS — H25.13: ICD-10-CM

## 2020-02-04 PROCEDURE — 92083 EXTENDED VISUAL FIELD XM: CPT | Performed by: OPHTHALMOLOGY

## 2020-02-04 PROCEDURE — 92012 INTRM OPH EXAM EST PATIENT: CPT | Performed by: OPHTHALMOLOGY

## 2020-02-04 ASSESSMENT — REFRACTION_CURRENTRX
OD_VPRISM_DIRECTION: SV
OS_CYLINDER: -1.75
OS_VPRISM_DIRECTION: SV
OD_OVR_VA: 20/
OD_CYLINDER: -2.00
OS_OVR_VA: 20/
OS_AXIS: 090
OD_AXIS: 095
OS_SPHERE: -0.50
OD_SPHERE: -0.50

## 2020-02-04 ASSESSMENT — VISUAL ACUITY
OD_BCVA: 20/20
OS_BCVA: 20/20

## 2020-02-04 ASSESSMENT — CONFRONTATIONAL VISUAL FIELD TEST (CVF)
OS_FINDINGS: FULL
OD_FINDINGS: FULL

## 2020-02-04 ASSESSMENT — REFRACTION_AUTOREFRACTION
OD_SPHERE: +0.25
OS_CYLINDER: -2.00
OS_SPHERE: -0.75
OD_AXIS: 091
OS_AXIS: 102
OD_CYLINDER: -2.75

## 2020-02-04 ASSESSMENT — SPHEQUIV_DERIVED
OS_SPHEQUIV: -1.75
OD_SPHEQUIV: -1.125

## 2020-02-10 ENCOUNTER — DOCTOR'S OFFICE (OUTPATIENT)
Dept: URBAN - METROPOLITAN AREA CLINIC 136 | Facility: CLINIC | Age: 45
Setting detail: OPHTHALMOLOGY
End: 2020-02-10
Payer: COMMERCIAL

## 2020-02-10 DIAGNOSIS — H25.13: ICD-10-CM

## 2020-02-10 PROCEDURE — 76519 ECHO EXAM OF EYE: CPT | Performed by: OPHTHALMOLOGY

## 2020-03-03 LAB
CHOLEST SERPL-MCNC: 215 MG/DL
CHOLEST/HDLC SERPL: 4.6 (CALC)
HDLC SERPL-MCNC: 47 MG/DL
LDLC SERPL CALC-MCNC: 140 MG/DL (CALC)
NONHDLC SERPL-MCNC: 168 MG/DL (CALC)
TRIGL SERPL-MCNC: 149 MG/DL

## 2020-03-11 DIAGNOSIS — K21.9 GASTROESOPHAGEAL REFLUX DISEASE WITHOUT ESOPHAGITIS: ICD-10-CM

## 2020-03-11 RX ORDER — PANTOPRAZOLE SODIUM 40 MG/1
TABLET, DELAYED RELEASE ORAL
Qty: 90 TABLET | Refills: 3 | Status: SHIPPED | OUTPATIENT
Start: 2020-03-11 | End: 2021-03-10

## 2020-03-11 NOTE — TELEPHONE ENCOUNTER
Requested medication(s) are due for refill today: Yes  Patient has already received a courtesy refill: No  Other reason request has been forwarded to provider: Medication Refill

## 2020-07-21 ENCOUNTER — OFFICE VISIT (OUTPATIENT)
Dept: FAMILY MEDICINE CLINIC | Facility: CLINIC | Age: 45
End: 2020-07-21
Payer: COMMERCIAL

## 2020-07-21 ENCOUNTER — APPOINTMENT (OUTPATIENT)
Dept: RADIOLOGY | Age: 45
End: 2020-07-21
Payer: COMMERCIAL

## 2020-07-21 VITALS
BODY MASS INDEX: 25.62 KG/M2 | WEIGHT: 173 LBS | HEART RATE: 60 BPM | HEIGHT: 69 IN | RESPIRATION RATE: 16 BRPM | DIASTOLIC BLOOD PRESSURE: 60 MMHG | TEMPERATURE: 98.2 F | SYSTOLIC BLOOD PRESSURE: 120 MMHG

## 2020-07-21 DIAGNOSIS — R22.31 LUMP ON FINGER, RIGHT: Primary | ICD-10-CM

## 2020-07-21 DIAGNOSIS — R22.31 LUMP ON FINGER, RIGHT: ICD-10-CM

## 2020-07-21 PROCEDURE — 73140 X-RAY EXAM OF FINGER(S): CPT

## 2020-07-21 PROCEDURE — 4004F PT TOBACCO SCREEN RCVD TLK: CPT | Performed by: NURSE PRACTITIONER

## 2020-07-21 PROCEDURE — 3008F BODY MASS INDEX DOCD: CPT | Performed by: NURSE PRACTITIONER

## 2020-07-21 PROCEDURE — 99213 OFFICE O/P EST LOW 20 MIN: CPT | Performed by: NURSE PRACTITIONER

## 2020-07-21 NOTE — PROGRESS NOTES
Chief Complaint   Patient presents with    Mass     right hand middle finger     Health Maintenance   Topic Date Due    Pneumococcal Vaccine: Pediatrics (0 to 5 Years) and At-Risk Patients (6 to 59 Years) (1 of 1 - PPSV23) 10/06/1980    HIV Screening  10/06/1989    BMI: Followup Plan  10/06/1992    Depression Screening PHQ  05/21/2019    Annual Physical  02/19/2020    Influenza Vaccine  07/01/2020    BMI: Adult  07/21/2021    DTaP,Tdap,and Td Vaccines (2 - Td) 09/01/2026    Pneumococcal Vaccine: 65+ Years (1 of 2 - PCV13) 10/06/2039    HIB Vaccine  Aged Out    Hepatitis B Vaccine  Aged Out    IPV Vaccine  Aged Out    Hepatitis A Vaccine  Aged Out    Meningococcal ACWY Vaccine  Aged Out    HPV Vaccine  Aged Out     Assessment/Plan:    Lump of the right finger- ? Lipoma vs tendon abnormality  He did briefly have pain in this hand several days prior to feeling the lump after opening his truck door  Not having pain at this time, full ROM  Xray of the finger ordered and pt was referred to our Hand specialists for further evaluation     Diagnoses and all orders for this visit:    Lump on finger, right  -     XR finger right third digit-middle; Future  -     Ambulatory referral to Hand Surgery; Future          Subjective:      Patient ID: Martina Basilio is a 38 y o  male  HPI    Pt presents by himself today for an acute visit   C/O a "lump" in his right middle finger for the past 5-6 days  He thinks he may have opened his truck door several days prior to this and felt a small pain in his finger but didn't think anything of it at the time  No changes from first noticing the lump  No trouble with moving his finger or hand    No hand weakness       The following portions of the patient's history were reviewed and updated as appropriate: allergies, current medications, past family history, past medical history, past social history, past surgical history and problem list     Review of Systems Constitutional: Negative for chills, fatigue and fever  Respiratory: Negative for cough, shortness of breath and wheezing  Cardiovascular: Negative for chest pain, palpitations and leg swelling  Gastrointestinal: Negative for abdominal pain, blood in stool, constipation, diarrhea and nausea  Genitourinary: Negative for dysuria  Musculoskeletal: Negative for arthralgias and myalgias  As noted in HPI   Skin: Negative for rash and wound  Neurological: Negative for dizziness, weakness, numbness and headaches  Psychiatric/Behavioral: Negative for sleep disturbance and suicidal ideas  Objective:      /60   Pulse 60   Temp 98 2 °F (36 8 °C) (Oral)   Resp 16   Ht 5' 9" (1 753 m)   Wt 78 5 kg (173 lb)   BMI 25 55 kg/m²          Physical Exam   Constitutional: He is oriented to person, place, and time  He appears well-developed and well-nourished  No distress  HENT:   Head: Normocephalic and atraumatic  Eyes: Pupils are equal, round, and reactive to light  Conjunctivae are normal    Cardiovascular: Normal rate, regular rhythm and normal heart sounds  No murmur heard  Pulmonary/Chest: Effort normal and breath sounds normal  No respiratory distress  He has no wheezes  Musculoskeletal: Normal range of motion  There is a palpable mass on the lateral aspect of the right 3rd digit, below the MCP joint  Mass is firm and stationary  Nontender to palpation    Neurological: He is alert and oriented to person, place, and time  Skin: Skin is warm and dry  No rash noted  He is not diaphoretic  No pallor  Psychiatric: He has a normal mood and affect   His behavior is normal  Judgment and thought content normal

## 2020-07-23 ENCOUNTER — OFFICE VISIT (OUTPATIENT)
Dept: OBGYN CLINIC | Facility: HOSPITAL | Age: 45
End: 2020-07-23
Payer: COMMERCIAL

## 2020-07-23 ENCOUNTER — TRANSCRIBE ORDERS (OUTPATIENT)
Dept: ADMINISTRATIVE | Facility: HOSPITAL | Age: 45
End: 2020-07-23

## 2020-07-23 VITALS
SYSTOLIC BLOOD PRESSURE: 133 MMHG | DIASTOLIC BLOOD PRESSURE: 89 MMHG | HEIGHT: 69 IN | WEIGHT: 174.3 LBS | BODY MASS INDEX: 25.82 KG/M2 | HEART RATE: 53 BPM

## 2020-07-23 DIAGNOSIS — R22.31 FINGER MASS, RIGHT: Primary | ICD-10-CM

## 2020-07-23 DIAGNOSIS — R22.31 FINGER MASS, RIGHT: ICD-10-CM

## 2020-07-23 PROCEDURE — 4004F PT TOBACCO SCREEN RCVD TLK: CPT | Performed by: ORTHOPAEDIC SURGERY

## 2020-07-23 PROCEDURE — 3008F BODY MASS INDEX DOCD: CPT | Performed by: ORTHOPAEDIC SURGERY

## 2020-07-23 PROCEDURE — 99203 OFFICE O/P NEW LOW 30 MIN: CPT | Performed by: ORTHOPAEDIC SURGERY

## 2020-07-23 NOTE — PROGRESS NOTES
Assessment/Plan   Diagnoses and all orders for this visit:    Finger mass, right  -     Cyst vs mass  -     US to determine cyst vs mass - aspirate if cystic  -     Follow up with Dr Charisma Cartagena after US          Subjective   Patient ID: Jacob Farr is a 38 y o  male  Vitals:    07/23/20 0920   BP: 133/89   Pulse: (!) 48     43yo male comes in for an evaluation of his right long finger  About a week ago, he noticed a lump on the ulnar side of the proximal phalanx  This is not painful  No specific injury  He saw his PCP and had an xray done  Today, he has no pain, numbness, or swelling  No fevers, chills, or systemic symptoms  The following portions of the patient's history were reviewed and updated as appropriate: allergies, current medications, past family history, past medical history, past social history, past surgical history and problem list     Review of Systems  Ortho Exam  Past Medical History:   Diagnosis Date    Allergic     Ho's esophagus     Cervical disc disorder     Eczema     GERD (gastroesophageal reflux disease)     Hyperlipidemia     Migraine     Streptococcal pharyngitis      Past Surgical History:   Procedure Laterality Date    APPENDECTOMY       Family History   Problem Relation Age of Onset    No Known Problems Mother      Social History     Occupational History    Not on file   Tobacco Use    Smoking status: Current Every Day Smoker     Packs/day: 0 25    Smokeless tobacco: Never Used   Substance and Sexual Activity    Alcohol use: Yes     Alcohol/week: 6 0 standard drinks     Types: 6 Cans of beer per week     Comment: occasional    Drug use: Yes     Types: Other, Marijuana     Comment: medical marijuana    Sexual activity: Not on file       Review of Systems   Constitutional: Negative  HENT: Negative  Eyes: Negative  Respiratory: Negative  Cardiovascular: Negative  Gastrointestinal: Negative  Endocrine: Negative  Genitourinary: Negative  Musculoskeletal: As below      Allergic/Immunologic: Negative  Neurological: Negative  Hematological: Negative  Psychiatric/Behavioral: Negative  Objective   Physical Exam    · Constitutional: Awake, Alert, Oriented  · Eyes: EOMI  · Psych: Mood and affect appropriate  · Heart: regular rate and rhythm  · Lungs: No audible wheezing  · Abdomen: soft  · Lymph: no lymphedema   right long finger:  - Appearance   No swelling, discoloration, deformity, erythema or ecchymosis  There is a mass on the proximal end of the proximal phalanx, ulnar side  - Palpation  o ~2cm mass  Soft  Mobile  Non-tender   - ROM  o Full extension  Full fist   No contracture  - Motor  o  5/5, wrist extension 5/5, and wrist flexion 5/5, interossi 5/5  - Special Tests  o normal sensation of hand and arm  - NVI distally    I have personally reviewed pertinent films in PACS and my interpretation is The mass is not visible on xray  No bony abnormality  Nahomy Ness

## 2020-07-27 ENCOUNTER — TELEPHONE (OUTPATIENT)
Dept: FAMILY MEDICINE CLINIC | Facility: CLINIC | Age: 45
End: 2020-07-27

## 2020-07-31 ENCOUNTER — HOSPITAL ENCOUNTER (OUTPATIENT)
Dept: RADIOLOGY | Facility: HOSPITAL | Age: 45
Discharge: HOME | End: 2020-07-31
Admitting: RADIOLOGY
Payer: COMMERCIAL

## 2020-07-31 DIAGNOSIS — R22.31 FINGER MASS, RIGHT: ICD-10-CM

## 2020-07-31 PROCEDURE — 20604 DRAIN/INJ JOINT/BURSA W/US: CPT

## 2020-07-31 RX ORDER — LIDOCAINE HYDROCHLORIDE 10 MG/ML
1 INJECTION, SOLUTION EPIDURAL; INFILTRATION; INTRACAUDAL; PERINEURAL ONCE
Status: COMPLETED | OUTPATIENT
Start: 2020-07-31 | End: 2020-07-31

## 2020-07-31 RX ADMIN — LIDOCAINE HYDROCHLORIDE 1 ML: 10 INJECTION, SOLUTION EPIDURAL; INFILTRATION; INTRACAUDAL; PERINEURAL at 09:10

## 2020-08-05 ENCOUNTER — TELEPHONE (OUTPATIENT)
Dept: OBGYN CLINIC | Facility: MEDICAL CENTER | Age: 45
End: 2020-08-05

## 2020-09-09 ENCOUNTER — DOCTOR'S OFFICE (OUTPATIENT)
Dept: URBAN - METROPOLITAN AREA CLINIC 136 | Facility: CLINIC | Age: 45
Setting detail: OPHTHALMOLOGY
End: 2020-09-09
Payer: COMMERCIAL

## 2020-09-09 DIAGNOSIS — H25.13: ICD-10-CM

## 2020-09-09 DIAGNOSIS — H40.033: ICD-10-CM

## 2020-09-09 DIAGNOSIS — H40.023: ICD-10-CM

## 2020-09-09 PROCEDURE — 92014 COMPRE OPH EXAM EST PT 1/>: CPT | Performed by: OPHTHALMOLOGY

## 2020-09-09 PROCEDURE — 92132 CPTRZD OPH DX IMG ANT SGM: CPT | Performed by: OPHTHALMOLOGY

## 2020-09-09 ASSESSMENT — CONFRONTATIONAL VISUAL FIELD TEST (CVF)
OD_FINDINGS: FULL
OS_FINDINGS: FULL

## 2020-09-10 ASSESSMENT — REFRACTION_CURRENTRX
OD_SPHERE: -0.50
OS_CYLINDER: -1.75
OD_OVR_VA: 20/
OD_VPRISM_DIRECTION: SV
OS_AXIS: 090
OS_VPRISM_DIRECTION: SV
OD_CYLINDER: -2.00
OD_AXIS: 095
OS_SPHERE: -0.50
OS_OVR_VA: 20/

## 2020-09-10 ASSESSMENT — REFRACTION_AUTOREFRACTION
OD_SPHERE: +0.25
OD_AXIS: 091
OS_AXIS: 102
OD_CYLINDER: -2.75
OS_CYLINDER: -2.00
OS_SPHERE: -0.75

## 2020-09-10 ASSESSMENT — SPHEQUIV_DERIVED
OS_SPHEQUIV: -1.75
OD_SPHEQUIV: -1.125

## 2020-09-10 ASSESSMENT — VISUAL ACUITY
OS_BCVA: 20/20-1
OD_BCVA: 20/20-1

## 2020-09-11 ENCOUNTER — OFFICE VISIT (OUTPATIENT)
Dept: OBGYN CLINIC | Facility: MEDICAL CENTER | Age: 45
End: 2020-09-11
Payer: COMMERCIAL

## 2020-09-11 VITALS
SYSTOLIC BLOOD PRESSURE: 133 MMHG | TEMPERATURE: 98.6 F | DIASTOLIC BLOOD PRESSURE: 98 MMHG | BODY MASS INDEX: 26.22 KG/M2 | HEIGHT: 69 IN | HEART RATE: 64 BPM | WEIGHT: 177 LBS

## 2020-09-11 DIAGNOSIS — R22.31 FINGER MASS, RIGHT: Primary | ICD-10-CM

## 2020-09-11 PROCEDURE — 4004F PT TOBACCO SCREEN RCVD TLK: CPT | Performed by: ORTHOPAEDIC SURGERY

## 2020-09-11 PROCEDURE — 99214 OFFICE O/P EST MOD 30 MIN: CPT | Performed by: ORTHOPAEDIC SURGERY

## 2020-12-14 ENCOUNTER — TELEMEDICINE (OUTPATIENT)
Dept: FAMILY MEDICINE CLINIC | Facility: CLINIC | Age: 45
End: 2020-12-14
Payer: COMMERCIAL

## 2020-12-14 ENCOUNTER — TELEPHONE (OUTPATIENT)
Dept: FAMILY MEDICINE CLINIC | Facility: CLINIC | Age: 45
End: 2020-12-14

## 2020-12-14 DIAGNOSIS — J06.9 ACUTE UPPER RESPIRATORY INFECTION: ICD-10-CM

## 2020-12-14 DIAGNOSIS — J06.9 ACUTE UPPER RESPIRATORY INFECTION: Primary | ICD-10-CM

## 2020-12-14 PROBLEM — J20.8 ACUTE BRONCHITIS DUE TO OTHER SPECIFIED ORGANISMS: Status: RESOLVED | Noted: 2019-08-21 | Resolved: 2020-12-14

## 2020-12-14 PROCEDURE — 99213 OFFICE O/P EST LOW 20 MIN: CPT | Performed by: FAMILY MEDICINE

## 2020-12-14 PROCEDURE — U0003 INFECTIOUS AGENT DETECTION BY NUCLEIC ACID (DNA OR RNA); SEVERE ACUTE RESPIRATORY SYNDROME CORONAVIRUS 2 (SARS-COV-2) (CORONAVIRUS DISEASE [COVID-19]), AMPLIFIED PROBE TECHNIQUE, MAKING USE OF HIGH THROUGHPUT TECHNOLOGIES AS DESCRIBED BY CMS-2020-01-R: HCPCS | Performed by: FAMILY MEDICINE

## 2020-12-14 PROCEDURE — 4004F PT TOBACCO SCREEN RCVD TLK: CPT | Performed by: FAMILY MEDICINE

## 2020-12-15 DIAGNOSIS — J01.00 ACUTE MAXILLARY SINUSITIS, RECURRENCE NOT SPECIFIED: Primary | ICD-10-CM

## 2020-12-15 LAB — SARS-COV-2 RNA SPEC QL NAA+PROBE: NOT DETECTED

## 2020-12-15 RX ORDER — AMOXICILLIN 875 MG/1
875 TABLET, COATED ORAL 2 TIMES DAILY
Qty: 14 TABLET | Refills: 0 | Status: SHIPPED | OUTPATIENT
Start: 2020-12-15 | End: 2020-12-22

## 2020-12-15 NOTE — RESULT ENCOUNTER NOTE
I reviewed the results with patient  He is requesting an antibiotic as he believes it is actually a sinus infection  Please advise?

## 2021-01-20 ENCOUNTER — DOCTOR'S OFFICE (OUTPATIENT)
Dept: URBAN - METROPOLITAN AREA CLINIC 136 | Facility: CLINIC | Age: 46
Setting detail: OPHTHALMOLOGY
End: 2021-01-20
Payer: COMMERCIAL

## 2021-01-20 DIAGNOSIS — H25.13: ICD-10-CM

## 2021-01-20 DIAGNOSIS — H40.033: ICD-10-CM

## 2021-01-20 DIAGNOSIS — H40.023: ICD-10-CM

## 2021-01-20 PROCEDURE — 92014 COMPRE OPH EXAM EST PT 1/>: CPT | Performed by: OPHTHALMOLOGY

## 2021-01-20 ASSESSMENT — PACHYMETRY
OS_CT_UM: 553
OS_CT_CORRECTION: -1
OD_CT_CORRECTION: 0
OD_CT_UM: 545

## 2021-01-20 ASSESSMENT — CONFRONTATIONAL VISUAL FIELD TEST (CVF)
OS_FINDINGS: FULL
OD_FINDINGS: FULL

## 2021-01-20 ASSESSMENT — TONOMETRY
OD_IOP_MMHG: 14
OS_IOP_MMHG: 13

## 2021-01-22 ENCOUNTER — OFFICE VISIT (OUTPATIENT)
Dept: FAMILY MEDICINE CLINIC | Facility: CLINIC | Age: 46
End: 2021-01-22
Payer: COMMERCIAL

## 2021-01-22 VITALS
DIASTOLIC BLOOD PRESSURE: 78 MMHG | HEIGHT: 69 IN | TEMPERATURE: 97 F | BODY MASS INDEX: 26.66 KG/M2 | SYSTOLIC BLOOD PRESSURE: 132 MMHG | WEIGHT: 180 LBS | OXYGEN SATURATION: 97 % | RESPIRATION RATE: 14 BRPM | HEART RATE: 76 BPM

## 2021-01-22 DIAGNOSIS — Z00.00 WELL ADULT EXAM: Primary | ICD-10-CM

## 2021-01-22 DIAGNOSIS — J30.2 SEASONAL ALLERGIES: ICD-10-CM

## 2021-01-22 DIAGNOSIS — E78.2 MIXED HYPERLIPIDEMIA: ICD-10-CM

## 2021-01-22 DIAGNOSIS — Z23 ENCOUNTER FOR IMMUNIZATION: ICD-10-CM

## 2021-01-22 DIAGNOSIS — F17.210 CIGARETTE NICOTINE DEPENDENCE WITHOUT COMPLICATION: ICD-10-CM

## 2021-01-22 DIAGNOSIS — K21.9 GERD WITHOUT ESOPHAGITIS: ICD-10-CM

## 2021-01-22 DIAGNOSIS — K22.70 BARRETT'S ESOPHAGUS WITHOUT DYSPLASIA: ICD-10-CM

## 2021-01-22 DIAGNOSIS — L91.8 INFLAMED SKIN TAG: ICD-10-CM

## 2021-01-22 DIAGNOSIS — Z11.4 SCREENING FOR HIV (HUMAN IMMUNODEFICIENCY VIRUS): ICD-10-CM

## 2021-01-22 PROBLEM — J06.9 ACUTE UPPER RESPIRATORY INFECTION: Status: RESOLVED | Noted: 2019-08-21 | Resolved: 2021-01-22

## 2021-01-22 PROCEDURE — 90471 IMMUNIZATION ADMIN: CPT

## 2021-01-22 PROCEDURE — 99396 PREV VISIT EST AGE 40-64: CPT | Performed by: FAMILY MEDICINE

## 2021-01-22 PROCEDURE — 90732 PPSV23 VACC 2 YRS+ SUBQ/IM: CPT

## 2021-01-22 PROCEDURE — 4004F PT TOBACCO SCREEN RCVD TLK: CPT | Performed by: FAMILY MEDICINE

## 2021-01-22 PROCEDURE — 3725F SCREEN DEPRESSION PERFORMED: CPT | Performed by: FAMILY MEDICINE

## 2021-01-22 PROCEDURE — 3008F BODY MASS INDEX DOCD: CPT | Performed by: FAMILY MEDICINE

## 2021-01-22 NOTE — PROGRESS NOTES
Chief Complaint   Patient presents with    Physical Exam     Annual Physical    Follow-up     6 month follow up     Health Maintenance   Topic Date Due    HIV Screening  10/06/1989    Annual Physical  02/19/2020    BMI: Followup Plan  12/14/2021    Depression Screening PHQ  01/22/2022    BMI: Adult  01/22/2022    DTaP,Tdap,and Td Vaccines (2 - Td) 09/01/2026    Pneumococcal Vaccine: Pediatrics (0 to 5 Years) and At-Risk Patients (6 to 59 Years)  Completed    Influenza Vaccine  Completed    HIB Vaccine  Aged Out    Hepatitis B Vaccine  Aged Out    IPV Vaccine  Aged Out    Hepatitis A Vaccine  Aged Out    Meningococcal ACWY Vaccine  Aged Out    HPV Vaccine  Aged Out     BMI Counseling: Body mass index is 26 58 kg/m²  The BMI is above normal  Nutrition recommendations include encouraging healthy choices of fruits and vegetables, decreasing fast food intake, consuming healthier snacks, limiting drinks that contain sugar, moderation in carbohydrate intake, increasing intake of lean protein, reducing intake of saturated and trans fat and reducing intake of cholesterol  Exercise recommendations include exercising 3-5 times per week  No pharmacotherapy was ordered  Assessment/Plan:    Well adult exam  Recommended to follow a well-balanced diet, regular exercise  Check labs  Follow-up with a dendist regularly  Pneumovax administered today  Ho's esophagus without dysplasia  EGD done by Dr Dolly Leung in March 2018 showed Ho's esophagus  Continue Pantoprazole 40 mg daily  Schedule follow-up visit with gastroenterology  GERD without esophagitis  Symptoms controlled on Pantoprazole 40 mg daily  Seasonal allergies  Take Zyrtec 10 mg daily during allergy seasons  Hyperlipidemia  Follow a low-cholesterol, low-fat diet, regular exercise  Check lipid panel  Cigarette nicotine dependence without complication  Counseled on smoking cessation      Inflamed skin tag  Recommended to schedule skin tag removal       Schedule follow-up office visit in 6 months  Diagnoses and all orders for this visit:    Well adult exam  -     CBC and differential; Future    Ho's esophagus without dysplasia    GERD without esophagitis    Encounter for immunization  -     PNEUMOCOCCAL POLYSACCHARIDE VACCINE 23-VALENT =>1YO SQ IM    Seasonal allergies    Cigarette nicotine dependence without complication    Mixed hyperlipidemia  -     Comprehensive metabolic panel; Future  -     Lipid panel; Future    Screening for HIV (human immunodeficiency virus)  -     HIV 1/2 Antigen/Antibody (4th Generation) w Reflex [facility]; Future    Inflamed skin tag          Subjective:      Patient ID: Rossana Lind is a 54 y o  male  HPI      Patient presents for annual physical exam       PMHx: GERD, Ho's esophagus, Hyperlipidemia, seasonal allergies, chronic neck pain, cervical spinal stenosis  Patient works full-time  He does a lot of walking at work  Denies chest pain, shortness of breath, dizziness  Patient does not exercise at home  GERD/ Ho's esophagus - followed by gastroenterologist Dr Husam Laura,  had EGD done in March 2018  Currently taking Pantoprazole 40 mg daily  Reports no heartburn, constipation, diarrhea, abdominal pain  C/o inflamed skin tag in left axilla  Patient is a current smoker  Smokes a few cigarettes daily  Drinks alcohol occasionally  Last blood test done in 2019  Patient had flu shot in October 2020, Tdap in 9/2016  Denies family history of colon cancer or prostate cancer  The following portions of the patient's history were reviewed and updated as appropriate: allergies, past family history, past medical history, past social history, past surgical history and problem list     Review of Systems   Constitutional: Negative for activity change, appetite change, chills, fatigue and fever     HENT: Negative for congestion, ear pain, hearing loss, mouth sores, nosebleeds, sore throat, tinnitus and trouble swallowing  Eyes: Negative for pain, discharge, redness, itching and visual disturbance  Respiratory: Negative for cough, chest tightness, shortness of breath and wheezing  Cardiovascular: Negative for chest pain, palpitations and leg swelling  Gastrointestinal: Negative for abdominal pain, blood in stool, constipation, diarrhea, nausea and vomiting  Denies heartburn   Genitourinary: Negative for difficulty urinating, dysuria, flank pain, frequency and hematuria  Musculoskeletal: Positive for neck pain (chronic)  Negative for arthralgias, back pain, gait problem, joint swelling and myalgias  Skin: Negative for rash  Inflamed skin tag in left axilla   Neurological: Negative for dizziness, syncope and headaches  Hematological: Negative  Psychiatric/Behavioral: Negative for dysphoric mood and sleep disturbance  The patient is not nervous/anxious  Objective:      /78 (BP Location: Left arm, Patient Position: Sitting, Cuff Size: Adult)   Pulse 76   Temp (!) 97 °F (36 1 °C) (Tympanic)   Resp 14   Ht 5' 9" (1 753 m)   Wt 81 6 kg (180 lb)   SpO2 97%   BMI 26 58 kg/m²          Physical Exam  Vitals signs and nursing note reviewed  Constitutional:       Appearance: Normal appearance  HENT:      Head: Normocephalic and atraumatic  Right Ear: Tympanic membrane and external ear normal       Left Ear: External ear normal    Eyes:      Conjunctiva/sclera: Conjunctivae normal       Pupils: Pupils are equal, round, and reactive to light  Neck:      Musculoskeletal: Normal range of motion and neck supple  No muscular tenderness  Vascular: No carotid bruit  Cardiovascular:      Rate and Rhythm: Normal rate and regular rhythm  Heart sounds: No murmur  Pulmonary:      Effort: Pulmonary effort is normal       Breath sounds: Normal breath sounds     Abdominal:      General: Bowel sounds are normal  There is no distension  Palpations: Abdomen is soft  Tenderness: There is no abdominal tenderness  Musculoskeletal: Normal range of motion  General: No swelling, tenderness or deformity  Right lower leg: No edema  Left lower leg: No edema  Lymphadenopathy:      Cervical: No cervical adenopathy  Skin:     General: Skin is warm and dry  Findings: No rash  Comments: Inflamed skin tag in left axilla   Neurological:      General: No focal deficit present  Mental Status: He is alert  Cranial Nerves: No cranial nerve deficit        Coordination: Coordination normal    Psychiatric:         Mood and Affect: Mood normal

## 2021-01-22 NOTE — ASSESSMENT & PLAN NOTE
EGD done by Dr Sunita Roth in March 2018 showed Ho's esophagus  Continue Pantoprazole 40 mg daily  Schedule follow-up visit with gastroenterology

## 2021-01-22 NOTE — ASSESSMENT & PLAN NOTE
Recommended to follow a well-balanced diet, regular exercise  Check labs  Follow-up with a dendist regularly  Pneumovax administered today

## 2021-01-27 ASSESSMENT — SPHEQUIV_DERIVED
OS_SPHEQUIV: -1.75
OD_SPHEQUIV: -1.125

## 2021-01-27 ASSESSMENT — REFRACTION_CURRENTRX
OD_OVR_VA: 20/
OS_CYLINDER: -1.75
OS_VPRISM_DIRECTION: SV
OD_SPHERE: -0.50
OS_AXIS: 090
OS_OVR_VA: 20/
OD_VPRISM_DIRECTION: SV
OD_AXIS: 095
OD_CYLINDER: -2.00
OS_SPHERE: -0.50

## 2021-01-27 ASSESSMENT — REFRACTION_AUTOREFRACTION
OD_AXIS: 091
OS_AXIS: 102
OD_CYLINDER: -2.75
OS_SPHERE: -0.75
OD_SPHERE: +0.25
OS_CYLINDER: -2.00

## 2021-01-27 ASSESSMENT — VISUAL ACUITY
OD_BCVA: 20/20
OS_BCVA: 20/20

## 2021-02-03 ENCOUNTER — PROCEDURE VISIT (OUTPATIENT)
Dept: FAMILY MEDICINE CLINIC | Facility: CLINIC | Age: 46
End: 2021-02-03
Payer: COMMERCIAL

## 2021-02-03 VITALS
SYSTOLIC BLOOD PRESSURE: 120 MMHG | OXYGEN SATURATION: 97 % | TEMPERATURE: 98 F | BODY MASS INDEX: 26.66 KG/M2 | RESPIRATION RATE: 14 BRPM | DIASTOLIC BLOOD PRESSURE: 82 MMHG | WEIGHT: 180 LBS | HEART RATE: 78 BPM | HEIGHT: 69 IN

## 2021-02-03 DIAGNOSIS — L91.8 INFLAMED SKIN TAG: Primary | ICD-10-CM

## 2021-02-03 PROCEDURE — 11200 RMVL SKIN TAGS UP TO&INC 15: CPT | Performed by: FAMILY MEDICINE

## 2021-02-03 PROCEDURE — 99213 OFFICE O/P EST LOW 20 MIN: CPT | Performed by: FAMILY MEDICINE

## 2021-02-03 PROCEDURE — 88304 TISSUE EXAM BY PATHOLOGIST: CPT | Performed by: PATHOLOGY

## 2021-02-03 PROCEDURE — 3008F BODY MASS INDEX DOCD: CPT | Performed by: FAMILY MEDICINE

## 2021-02-03 PROCEDURE — 4004F PT TOBACCO SCREEN RCVD TLK: CPT | Performed by: FAMILY MEDICINE

## 2021-02-03 NOTE — ASSESSMENT & PLAN NOTE
Inflamed skin tag 4 x  5 mm removed from left axilla  Specimen sent for pathology  Patient tolerated procedure well  Skin care instructions provided to patient

## 2021-02-03 NOTE — PROGRESS NOTES
Chief Complaint   Patient presents with    Skin Tag Removal     Left Axillary Skin Tag     Health Maintenance   Topic Date Due    HIV Screening  10/06/1990    Depression Screening PHQ  01/22/2022    BMI: Followup Plan  01/22/2022    Annual Physical  01/22/2022    BMI: Adult  02/03/2022    DTaP,Tdap,and Td Vaccines (2 - Td) 09/01/2026    Pneumococcal Vaccine: Pediatrics (0 to 5 Years) and At-Risk Patients (6 to 59 Years)  Completed    Influenza Vaccine  Completed    HIB Vaccine  Aged Out    Hepatitis B Vaccine  Aged Out    IPV Vaccine  Aged Out    Hepatitis A Vaccine  Aged Out    Meningococcal ACWY Vaccine  Aged Out    HPV Vaccine  Aged Out               Assessment/Plan:    Inflamed skin tag  Inflamed skin tag 4 x  5 mm removed from left axilla  Specimen sent for pathology  Patient tolerated procedure well  Skin care instructions provided to patient  Diagnoses and all orders for this visit:    Inflamed skin tag  -     Tissue Exam; Future  -     Skin tag removal  -     Tissue Exam          Subjective:      Patient ID: Marc Craig is a 54 y o  male  HPI     Patient presents for removal of inflamed skin tag from left axilla  No history of allergy to anesthesia drugs  Denies easy bruising or bleeding  The following portions of the patient's history were reviewed and updated as appropriate: allergies, current medications, past medical history, past social history, past surgical history and problem list     Review of Systems   Constitutional: Negative for appetite change, chills, fatigue and fever  Respiratory: Negative for cough, chest tightness and shortness of breath  Cardiovascular: Negative for chest pain and palpitations  Gastrointestinal: Negative  Skin:        Inflamed skin tag in left axilla    Neurological: Negative for dizziness and headaches  Hematological: Negative for adenopathy  Does not bruise/bleed easily     Psychiatric/Behavioral: The patient is not nervous/anxious  Objective:      /82 (BP Location: Left arm, Patient Position: Sitting, Cuff Size: Adult)   Pulse 78   Temp 98 °F (36 7 °C) (Tympanic)   Resp 14   Ht 5' 9" (1 753 m)   Wt 81 6 kg (180 lb)   SpO2 97%   BMI 26 58 kg/m²          Physical Exam  Vitals signs and nursing note reviewed  Constitutional:       Appearance: Normal appearance  HENT:      Head: Normocephalic and atraumatic  Eyes:      Conjunctiva/sclera: Conjunctivae normal       Pupils: Pupils are equal, round, and reactive to light  Cardiovascular:      Rate and Rhythm: Normal rate and regular rhythm  Pulmonary:      Effort: Pulmonary effort is normal       Breath sounds: Normal breath sounds  Musculoskeletal: Normal range of motion  General: No swelling or tenderness  Right lower leg: No edema  Left lower leg: No edema  Skin:     General: Skin is warm and dry  Findings: No rash  Comments: Inflamed skin tag 4 x 5 mm in left axilla   Psychiatric:         Mood and Affect: Mood normal          Skin tag removal    Date/Time: 2/3/2021 2:32 PM  Performed by: Cem Briscoe MD  Authorized by: Cem Briscoe MD   Universal Protocol:  Consent: Verbal consent obtained  Written consent obtained  Risks and benefits: risks, benefits and alternatives were discussed  Consent given by: patient  Timeout called at: 2/3/2021 2:33 PM   Patient identity confirmed: verbally with patient        Procedure Details - Skin Tag Destruction:     Up to 15      Total (if more than 15):  1    Body area:  Trunk    Trunk location:  L axilla      Under sterile conditions and local anesthesia with Lidocaine 2% and Epi inflamed skin tag 4 x 5 cm removed from left axilla by shaving  Site of removal cauterized with electrocautery  No bleeding  Patient tolerated procedure well  Applied sterile bandage  Specimen sent for pathology  Skin care increase instructions provided to patient

## 2021-02-21 DIAGNOSIS — E78.2 MIXED HYPERLIPIDEMIA: Primary | ICD-10-CM

## 2021-02-22 LAB
ALBUMIN SERPL-MCNC: 4.6 G/DL (ref 3.6–5.1)
ALBUMIN/GLOB SERPL: 2.1 (CALC) (ref 1–2.5)
ALP SERPL-CCNC: 83 U/L (ref 36–130)
ALT SERPL-CCNC: 42 U/L (ref 9–46)
AST SERPL-CCNC: 26 U/L (ref 10–40)
BASOPHILS # BLD AUTO: 39 CELLS/UL (ref 0–200)
BASOPHILS NFR BLD AUTO: 0.5 %
BILIRUB SERPL-MCNC: 0.6 MG/DL (ref 0.2–1.2)
BUN SERPL-MCNC: 16 MG/DL (ref 7–25)
BUN/CREAT SERPL: NORMAL (CALC) (ref 6–22)
CALCIUM SERPL-MCNC: 9.2 MG/DL (ref 8.6–10.3)
CHLORIDE SERPL-SCNC: 102 MMOL/L (ref 98–110)
CHOLEST SERPL-MCNC: 245 MG/DL
CHOLEST/HDLC SERPL: 5.4 (CALC)
CO2 SERPL-SCNC: 29 MMOL/L (ref 20–32)
CREAT SERPL-MCNC: 1.09 MG/DL (ref 0.6–1.35)
EOSINOPHIL # BLD AUTO: 226 CELLS/UL (ref 15–500)
EOSINOPHIL NFR BLD AUTO: 2.9 %
ERYTHROCYTE [DISTWIDTH] IN BLOOD BY AUTOMATED COUNT: 12.5 % (ref 11–15)
GLOBULIN SER CALC-MCNC: 2.2 G/DL (CALC) (ref 1.9–3.7)
GLUCOSE SERPL-MCNC: 92 MG/DL (ref 65–99)
HCT VFR BLD AUTO: 47.2 % (ref 38.5–50)
HDLC SERPL-MCNC: 45 MG/DL
HGB BLD-MCNC: 15.9 G/DL (ref 13.2–17.1)
HIV 1+2 AB+HIV1 P24 AG SERPL QL IA: NORMAL
LDLC SERPL CALC-MCNC: 144 MG/DL (CALC)
LYMPHOCYTES # BLD AUTO: 2215 CELLS/UL (ref 850–3900)
LYMPHOCYTES NFR BLD AUTO: 28.4 %
MCH RBC QN AUTO: 31.7 PG (ref 27–33)
MCHC RBC AUTO-ENTMCNC: 33.7 G/DL (ref 32–36)
MCV RBC AUTO: 94.2 FL (ref 80–100)
MONOCYTES # BLD AUTO: 749 CELLS/UL (ref 200–950)
MONOCYTES NFR BLD AUTO: 9.6 %
NEUTROPHILS # BLD AUTO: 4571 CELLS/UL (ref 1500–7800)
NEUTROPHILS NFR BLD AUTO: 58.6 %
NONHDLC SERPL-MCNC: 200 MG/DL (CALC)
PLATELET # BLD AUTO: 237 THOUSAND/UL (ref 140–400)
PMV BLD REES-ECKER: 12.5 FL (ref 7.5–12.5)
POTASSIUM SERPL-SCNC: 4.4 MMOL/L (ref 3.5–5.3)
PROT SERPL-MCNC: 6.8 G/DL (ref 6.1–8.1)
RBC # BLD AUTO: 5.01 MILLION/UL (ref 4.2–5.8)
SL AMB EGFR AFRICAN AMERICAN: 94 ML/MIN/1.73M2
SL AMB EGFR NON AFRICAN AMERICAN: 81 ML/MIN/1.73M2
SODIUM SERPL-SCNC: 138 MMOL/L (ref 135–146)
TRIGL SERPL-MCNC: 367 MG/DL
WBC # BLD AUTO: 7.8 THOUSAND/UL (ref 3.8–10.8)

## 2021-03-10 DIAGNOSIS — K21.9 GASTROESOPHAGEAL REFLUX DISEASE WITHOUT ESOPHAGITIS: ICD-10-CM

## 2021-03-10 RX ORDER — PANTOPRAZOLE SODIUM 40 MG/1
TABLET, DELAYED RELEASE ORAL
Qty: 30 TABLET | Refills: 6 | Status: SHIPPED | OUTPATIENT
Start: 2021-03-10 | End: 2021-11-22

## 2021-07-20 LAB
CHOLEST SERPL-MCNC: 239 MG/DL
CHOLEST/HDLC SERPL: 4.7 (CALC)
HDLC SERPL-MCNC: 51 MG/DL
LDLC SERPL CALC-MCNC: 148 MG/DL (CALC)
NONHDLC SERPL-MCNC: 188 MG/DL (CALC)
TRIGL SERPL-MCNC: 263 MG/DL

## 2021-08-24 ENCOUNTER — OFFICE VISIT (OUTPATIENT)
Dept: FAMILY MEDICINE CLINIC | Facility: CLINIC | Age: 46
End: 2021-08-24
Payer: COMMERCIAL

## 2021-08-24 VITALS
HEART RATE: 58 BPM | WEIGHT: 178.4 LBS | DIASTOLIC BLOOD PRESSURE: 70 MMHG | HEIGHT: 69 IN | BODY MASS INDEX: 26.42 KG/M2 | OXYGEN SATURATION: 98 % | RESPIRATION RATE: 14 BRPM | SYSTOLIC BLOOD PRESSURE: 120 MMHG | TEMPERATURE: 97.9 F

## 2021-08-24 DIAGNOSIS — M54.2 CHRONIC NECK AND BACK PAIN: ICD-10-CM

## 2021-08-24 DIAGNOSIS — K21.9 GERD WITHOUT ESOPHAGITIS: Primary | ICD-10-CM

## 2021-08-24 DIAGNOSIS — E78.2 MIXED HYPERLIPIDEMIA: ICD-10-CM

## 2021-08-24 DIAGNOSIS — M54.9 CHRONIC NECK AND BACK PAIN: ICD-10-CM

## 2021-08-24 DIAGNOSIS — F17.210 CIGARETTE NICOTINE DEPENDENCE WITHOUT COMPLICATION: ICD-10-CM

## 2021-08-24 DIAGNOSIS — G89.29 CHRONIC NECK AND BACK PAIN: ICD-10-CM

## 2021-08-24 DIAGNOSIS — K22.70 BARRETT'S ESOPHAGUS WITHOUT DYSPLASIA: ICD-10-CM

## 2021-08-24 PROCEDURE — 99214 OFFICE O/P EST MOD 30 MIN: CPT | Performed by: FAMILY MEDICINE

## 2021-08-24 RX ORDER — ROSUVASTATIN CALCIUM 10 MG/1
10 TABLET, COATED ORAL DAILY
Qty: 30 TABLET | Refills: 5 | Status: SHIPPED | OUTPATIENT
Start: 2021-08-24 | End: 2022-03-14

## 2021-08-24 NOTE — PROGRESS NOTES
Chief Complaint   Patient presents with    Follow-up     6 month follow up     Health Maintenance   Topic Date Due    Hepatitis C Screening  Never done    COVID-19 Vaccine (1) Never done    Influenza Vaccine (1) 09/01/2021    BMI: Followup Plan  01/22/2022    Annual Physical  01/22/2022    Depression Screening PHQ  08/24/2022    BMI: Adult  08/24/2022    DTaP,Tdap,and Td Vaccines (2 - Td or Tdap) 09/01/2026    Pneumococcal Vaccine: Pediatrics (0 to 5 Years) and At-Risk Patients (6 to 59 Years) (2 of 2 - PPSV23) 10/06/2039    HIV Screening  Completed    HIB Vaccine  Aged Out    Hepatitis B Vaccine  Aged Out    IPV Vaccine  Aged Out    Hepatitis A Vaccine  Aged Out    Meningococcal ACWY Vaccine  Aged Out    HPV Vaccine  Aged Out             Tobacco Cessation Counseling: Tobacco cessation counseling was provided  The patient is sincerely urged to quit consumption of tobacco  He is not ready to quit tobacco        Assessment/Plan:    GERD without esophagitis  Symptoms are stable  Continue Pantoprazole 40 mg daily  Ho's esophagus without dysplasia  EGD done by gastroenterologist Dr Eulalia Vivar in March 2018 showed Ho's esophagus  Continue PPI therapy  Follow- up with gastroenterology Dr Eulalia Vivar  Hyperlipidemia  Patient has uncontrolled Hyperlipidemia  Reviewed dietary and lifestyle modifications with patient  Decrease alcohol consumption  Will start Crestor 10 mg daily  Discussed side effects  Recheck CMP, lipid panel in 2- 3 months  Chronic neck and back pain  Continue regular exercise, stretching  Cigarette nicotine dependence without complication  Patient cut down on cigarettes to 2-3 per day  Encouraged patient to quit smoking completely  Schedule follow-up office visit in 6 months  Diagnoses and all orders for this visit:    GERD without esophagitis    Ho's esophagus without dysplasia    Mixed hyperlipidemia  -     Lipid panel;  Future  - Comprehensive metabolic panel; Future  -     rosuvastatin (CRESTOR) 10 MG tablet; Take 1 tablet (10 mg total) by mouth daily    Chronic neck and back pain    Cigarette nicotine dependence without complication          Subjective:      Patient ID: Betty Martines is a 54 y o  male  HPI     Patient presents for 6 month follow-up office visit  PMHx: GERD, Ho's esophagus, Hyperlipidemia, seasonal allergies, chronic neck pain, cervical spinal stenosis  Reviewed current medications, blood test results from 7/20/21  Cholesterol 239, HDL 51, , triglycerides 263  Hyperlipidemia- patient states that he did not change his diet since last visit  He works at [employer], does a lot of walking  Family history is positive for Hyperlipidemia in his mother who takes cholesterol-lowering medication  GERD/  Ho's esophagus - management per gastroenterology Dr Sarah Loera  Patient had EGD done in March 2018  Currently taking Pantoprazole 40 mg daily  Denies heartburn, abdominal pain  Patient states that he cut down on smoking, smokes 2-3 cigarettes daily  Drinks beer, occasional whiskey 2-3 times per week  He stopped using marijuana because it made him feel very tired  Refusing COVID vaccination  The following portions of the patient's history were reviewed and updated as appropriate: allergies, past family history, past medical history, past social history, past surgical history and problem list     Review of Systems   Constitutional: Negative for activity change, appetite change, chills, fatigue and fever  HENT: Negative for congestion, ear pain, mouth sores, nosebleeds, sore throat and trouble swallowing  Eyes: Negative  Respiratory: Negative for cough, chest tightness, shortness of breath and wheezing  Cardiovascular: Negative for chest pain, palpitations and leg swelling     Gastrointestinal: Negative for abdominal pain, blood in stool, constipation, diarrhea, nausea and vomiting  Denies tobacco use   Genitourinary: Negative for difficulty urinating, dysuria, flank pain, frequency and hematuria  Musculoskeletal: Positive for back pain (chrinic) and neck pain (chronic)  Negative for arthralgias, joint swelling and myalgias  Skin: Negative for rash  Neurological: Negative for dizziness, syncope and headaches  Hematological: Negative  Psychiatric/Behavioral: Negative for dysphoric mood and sleep disturbance  The patient is not nervous/anxious  Objective:      /70 (BP Location: Left arm, Patient Position: Sitting, Cuff Size: Adult)   Pulse 58   Temp 97 9 °F (36 6 °C) (Tympanic)   Resp 14   Ht 5' 9" (1 753 m)   Wt 80 9 kg (178 lb 6 4 oz)   SpO2 98%   BMI 26 35 kg/m²          Physical Exam  Vitals and nursing note reviewed  Constitutional:       Appearance: Normal appearance  HENT:      Head: Normocephalic and atraumatic  Right Ear: Tympanic membrane and external ear normal       Left Ear: Tympanic membrane and external ear normal    Eyes:      Conjunctiva/sclera: Conjunctivae normal       Pupils: Pupils are equal, round, and reactive to light  Neck:      Vascular: No carotid bruit  Cardiovascular:      Rate and Rhythm: Normal rate and regular rhythm  Heart sounds: No murmur heard  Pulmonary:      Effort: Pulmonary effort is normal       Breath sounds: Normal breath sounds  Abdominal:      General: Bowel sounds are normal  There is no distension  Palpations: Abdomen is soft  Tenderness: There is no abdominal tenderness  Musculoskeletal:         General: No swelling, tenderness or deformity  Normal range of motion  Cervical back: Normal range of motion and neck supple  Right lower leg: No edema  Left lower leg: No edema  Skin:     General: Skin is warm and dry  Findings: No rash  Neurological:      Mental Status: He is alert     Psychiatric:         Mood and Affect: Mood normal

## 2021-08-24 NOTE — ASSESSMENT & PLAN NOTE
EGD done by gastroenterologist Dr Flores in March 2018 showed Ho's esophagus  Continue PPI therapy  Follow- up with gastroenterology Dr Flores

## 2021-08-24 NOTE — ASSESSMENT & PLAN NOTE
Patient has uncontrolled Hyperlipidemia  Reviewed dietary and lifestyle modifications with patient  Decrease alcohol consumption  Will start Crestor 10 mg daily  Discussed side effects  Recheck CMP, lipid panel in 2- 3 months

## 2021-10-11 ENCOUNTER — IMMUNIZATIONS (OUTPATIENT)
Dept: FAMILY MEDICINE CLINIC | Facility: CLINIC | Age: 46
End: 2021-10-11
Payer: COMMERCIAL

## 2021-10-11 DIAGNOSIS — Z23 ENCOUNTER FOR IMMUNIZATION: Primary | ICD-10-CM

## 2021-10-11 PROCEDURE — 90686 IIV4 VACC NO PRSV 0.5 ML IM: CPT

## 2021-10-11 PROCEDURE — 90471 IMMUNIZATION ADMIN: CPT

## 2021-11-20 DIAGNOSIS — K21.9 GASTROESOPHAGEAL REFLUX DISEASE WITHOUT ESOPHAGITIS: ICD-10-CM

## 2021-11-22 RX ORDER — PANTOPRAZOLE SODIUM 40 MG/1
TABLET, DELAYED RELEASE ORAL
Qty: 30 TABLET | Refills: 6 | Status: SHIPPED | OUTPATIENT
Start: 2021-11-22 | End: 2022-07-16

## 2022-01-07 ENCOUNTER — TELEMEDICINE (OUTPATIENT)
Dept: FAMILY MEDICINE CLINIC | Facility: CLINIC | Age: 47
End: 2022-01-07
Payer: COMMERCIAL

## 2022-01-07 DIAGNOSIS — U07.1 COVID-19: Primary | ICD-10-CM

## 2022-01-07 PROCEDURE — 99212 OFFICE O/P EST SF 10 MIN: CPT | Performed by: FAMILY MEDICINE

## 2022-01-07 NOTE — ASSESSMENT & PLAN NOTE
Patient is a [employer]   Had COVID 2 weeks prior  Symptoms have largely resolved  Currently still having nausea  Recommended the patient drinking ginger tea or suck on ginger candy as a way to decrease nausea  Also recommend the patient stay well hydrated also eat food when needed to decrease nausea and dizziness  Advised patient if symptoms get worse patient should follow-up in the office  Follow-up as needed

## 2022-01-07 NOTE — PROGRESS NOTES
COVID-19 Outpatient Progress Note    Assessment/Plan:    Problem List Items Addressed This Visit        Other    COVID-19 - Primary      Patient is a [employer]   Had COVID 2 weeks prior  Symptoms have largely resolved  Currently still having nausea  Recommended the patient drinking ginger tea or suck on ginger candy as a way to decrease nausea  Also recommend the patient stay well hydrated also eat food when needed to decrease nausea and dizziness  Advised patient if symptoms get worse patient should follow-up in the office  Follow-up as needed  Disposition:     I have spent 15 minutes directly with the patient  Greater than 50% of this time was spent in counseling/coordination of care regarding: risks and benefits of treatment options, instructions for management, patient and family education and importance of treatment compliance  Encounter provider Ethel Krishna DO    Provider located at [address] [place] [id number]    Recent Visits  No visits were found meeting these conditions  Showing recent visits within past 7 days and meeting all other requirements  Today's Visits  Date Type Provider Dept   01/07/22 Telemedicine [facility] [address] today's visits and meeting all other requirements  Future Appointments  No visits were found meeting these conditions  Showing future appointments within next 150 days and meeting all other requirements     This virtual check-in was done via [facility] and patient was informed that this is a secure, HIPAA-compliant platform  He agrees to proceed  Patient agrees to participate in a virtual check in via telephone or video visit instead of presenting to the office to address urgent/immediate medical needs  Patient is aware this is a billable service  After connecting through [facility], the patient was identified by name and date of birth  Lyn Arce was informed that this was a telemedicine visit and that the exam was being conducted confidentially over secure lines  My office door was closed  No one else was in the room  Lyn Arce acknowledged consent and understanding of privacy and security of the telemedicine visit  I informed the patient that I have reviewed his record in Epic and presented the opportunity for him to ask any questions regarding the visit today  The patient agreed to participate  Verification of patient location:  Patient is located in the following state in which I hold an active license: [place]    Subjective:   Lyn Arce is a 51 y o  male who has been screened for COVID-19  Symptom change since last report: improving  Patient's symptoms include nausea  Patient denies fever, chills, fatigue, malaise, congestion, rhinorrhea, sore throat, anosmia, loss of taste, cough, shortness of breath, chest tightness, abdominal pain, vomiting, diarrhea, myalgias and headaches  Date of symptom onset: 12/24/2021  Date of positive COVID-19 PCR: 12/26/2021  COVID-19 vaccination status: Not vaccinated    Annia Medina has been staying home and has isolated themselves in his home  He is taking care to not share personal items and is cleaning all surfaces that are touched often, like counters, tabletops, and doorknobs using household cleaning sprays or wipes  He is wearing a mask when he leaves his room  Got sick rao tino  Works as a "[employer]       Lab Results   Component Value Date    SARSCOV2 Not Detected 12/14/2020     Past Medical History:   Diagnosis Date    Allergic     Ho's esophagus     Cervical disc disorder     Eczema     GERD (gastroesophageal reflux disease)     Hyperlipidemia     Migraine     Streptococcal pharyngitis      Past Surgical History:   Procedure Laterality Date    APPENDECTOMY      US GUIDED MSK PROCEDURE  7/31/2020     Current Outpatient Medications   Medication Sig Dispense Refill    latanoprost (XALATAN) 0 005 % ophthalmic solution INSTILL 1 DROP INTO BOTH EYES AT BEDTIME, STOCK SIZE 2 5ML  3    pantoprazole (PROTONIX) 40 mg tablet TAKE 1 TABLET BY MOUTH EVERY DAY 30 tablet 6    rosuvastatin (CRESTOR) 10 MG tablet Take 1 tablet (10 mg total) by mouth daily 30 tablet 5     No current facility-administered medications for this visit  No Known Allergies    Review of Systems   Constitutional: Negative for chills, fatigue and fever  HENT: Negative for congestion, rhinorrhea and sore throat  Respiratory: Negative for cough, chest tightness and shortness of breath  Gastrointestinal: Positive for nausea  Negative for abdominal pain, diarrhea and vomiting  Musculoskeletal: Negative for myalgias  Neurological: Negative for headaches  Objective:    No vitals as patient did not have equipment    There were no vitals filed for this visit  Physical Exam  Constitutional:       General: He is not in acute distress  HENT:      Head:      Comments: No sinus tenderness  Eyes:      Conjunctiva/sclera: Conjunctivae normal    Pulmonary:      Effort: No respiratory distress  Abdominal:      Palpations: Abdomen is soft  Tenderness: There is no abdominal tenderness  Lymphadenopathy:      Cervical: No cervical adenopathy  Neurological:      Mental Status: He is alert  VIRTUAL VISIT DISCLAIMER    Corky Thomas verbally agrees to participate in [employer]  Pt is aware that [employer] could be limited without vital signs or the ability to perform a full hands-on physical exam  Hardy Calvillo understands he or the provider may request at any time to terminate the video visit and request the patient to seek care or treatment in person  This note has been constructed using a voice recognition system  There may be translation, syntax, or grammatical errors  If you have an questions, please contact the dictating provider

## 2022-02-19 PROBLEM — L91.8 INFLAMED SKIN TAG: Status: RESOLVED | Noted: 2021-01-22 | Resolved: 2022-02-19

## 2022-02-22 ENCOUNTER — OFFICE VISIT (OUTPATIENT)
Dept: FAMILY MEDICINE CLINIC | Facility: CLINIC | Age: 47
End: 2022-02-22
Payer: COMMERCIAL

## 2022-02-22 VITALS
OXYGEN SATURATION: 98 % | BODY MASS INDEX: 27.73 KG/M2 | WEIGHT: 187.2 LBS | HEART RATE: 80 BPM | SYSTOLIC BLOOD PRESSURE: 126 MMHG | HEIGHT: 69 IN | RESPIRATION RATE: 16 BRPM | TEMPERATURE: 97.6 F | DIASTOLIC BLOOD PRESSURE: 92 MMHG

## 2022-02-22 DIAGNOSIS — Z11.59 NEED FOR HEPATITIS C SCREENING TEST: ICD-10-CM

## 2022-02-22 DIAGNOSIS — G89.29 CHRONIC NECK AND BACK PAIN: ICD-10-CM

## 2022-02-22 DIAGNOSIS — R03.0 BLOOD PRESSURE INCREASE DIASTOLIC: ICD-10-CM

## 2022-02-22 DIAGNOSIS — M54.2 CHRONIC NECK AND BACK PAIN: ICD-10-CM

## 2022-02-22 DIAGNOSIS — K22.70 BARRETT'S ESOPHAGUS WITHOUT DYSPLASIA: ICD-10-CM

## 2022-02-22 DIAGNOSIS — M54.9 CHRONIC NECK AND BACK PAIN: ICD-10-CM

## 2022-02-22 DIAGNOSIS — E78.2 MIXED HYPERLIPIDEMIA: Primary | ICD-10-CM

## 2022-02-22 DIAGNOSIS — F17.210 CIGARETTE NICOTINE DEPENDENCE WITHOUT COMPLICATION: ICD-10-CM

## 2022-02-22 DIAGNOSIS — K21.9 GERD WITHOUT ESOPHAGITIS: ICD-10-CM

## 2022-02-22 PROCEDURE — 99213 OFFICE O/P EST LOW 20 MIN: CPT | Performed by: FAMILY MEDICINE

## 2022-02-22 NOTE — ASSESSMENT & PLAN NOTE
Continue Crestor 10 mg daily  Check CMP, lipid panel is ordered August 2021  Reprinted script for blood work

## 2022-02-22 NOTE — PROGRESS NOTES
Chief Complaint   Patient presents with    Follow-up     6 months  Health Maintenance   Topic Date Due    Hepatitis C Screening  Never done    COVID-19 Vaccine (1) Never done    BMI: Followup Plan  01/22/2022    Annual Physical  01/22/2022    Depression Screening  02/22/2023    BMI: Adult  02/22/2023    DTaP,Tdap,and Td Vaccines (2 - Td or Tdap) 09/01/2026    Pneumococcal Vaccine: Pediatrics (0 to 5 Years) and At-Risk Patients (6 to 59 Years) (2 of 2 - PPSV23) 10/06/2039    HIV Screening  Completed    Influenza Vaccine  Completed    HIB Vaccine  Aged Out    Hepatitis B Vaccine  Aged Out    IPV Vaccine  Aged Out    Hepatitis A Vaccine  Aged Out    Meningococcal ACWY Vaccine  Aged Out    HPV Vaccine  Aged Out     BMI Counseling: Body mass index is 27 64 kg/m²  The BMI is above normal  Nutrition recommendations include decreasing portion sizes, encouraging healthy choices of fruits and vegetables, decreasing fast food intake, consuming healthier snacks, limiting drinks that contain sugar, moderation in carbohydrate intake, increasing intake of lean protein, reducing intake of saturated and trans fat and reducing intake of cholesterol  Exercise recommendations include exercising 3-5 times per week  No pharmacotherapy was ordered  Rationale for BMI follow-up plan is due to patient being overweight or obese  Assessment/Plan:    Hyperlipidemia  Continue Crestor 10 mg daily  Check CMP, lipid panel is ordered August 2021  Reprinted script for blood work  GERD without esophagitis  Continue Pantoprazole 40 mg daily  Avoid caffeine, fried, fatty foods  Ho's esophagus without dysplasia  Management per gastroenterology Dr Fiorella Weathers  Continue PPI therapy  EGD scheduled in April  Cigarette nicotine dependence without complication  Counseled patient on smoking cessation  Encouraged patient to quit smoking completely      Chronic neck and back pain  Regular regular exercise, stretching  Blood pressure increase diastolic  Recommended to follow a low-sodium diet, avoid processed foods  Encouraged regular exercise  Schedule physical exam in 6 months  Diagnoses and all orders for this visit:    Mixed hyperlipidemia    GERD without esophagitis    Ho's esophagus without dysplasia    Cigarette nicotine dependence without complication    Chronic neck and back pain    Need for hepatitis C screening test  -     Hepatitis C Antibody ([facility]); Future    Blood pressure increase diastolic          Subjective:      Patient ID: Jhoana Cerda is a 51 y o  male  HPI     Patient presents for 6 month follow-up office visit  PMHx: GERD, Ho's esophagus, Hyperlipidemia, seasonal allergies, chronic neck pain, cervical spinal stenosis  Reviewed current medications, last blood test results from 11/2021  Glucose 100, creatinine 1 15, potassium 3 9  LFTs  - within normal range  Patient works as a [employer]  part-time  Hyperlipidemia - patient takes Crestor 10 mg daily  Reports no side effects  GERD / Ho's esophagus - patient takes Pantoprazole 40 mg daily  He reports occasional heartburn when skips medication  Denies abdominal pain  EGD scheduled with gastroenterologist Dr Violet Dancer in April  Tobacco use - patient cut down on smoking  He smokes 1 pack per week, tries to stop smoking completely  Denies family history of colon cancer, prostate cancer  Patient had COVID- 19 virus infection in December 2022  The following portions of the patient's history were reviewed and updated as appropriate: allergies, current medications, past medical history, past social history, past surgical history and problem list     Review of Systems   Constitutional: Negative for activity change, appetite change, chills, fatigue and fever  HENT: Negative for congestion, ear pain, nosebleeds and sore throat  Eyes: Negative      Respiratory: Negative for cough, chest tightness, shortness of breath and wheezing  Cardiovascular: Negative for chest pain, palpitations and leg swelling  Gastrointestinal: Negative for abdominal pain, blood in stool, constipation, diarrhea, nausea and vomiting  Genitourinary: Negative for difficulty urinating, dysuria, flank pain, frequency and hematuria  Musculoskeletal: Positive for back pain (chronic) and neck pain  Negative for arthralgias, gait problem and joint swelling  Myalgias: chronic  Neurological: Negative for dizziness, syncope and headaches  Hematological: Negative  Psychiatric/Behavioral: Negative for dysphoric mood and sleep disturbance  The patient is not nervous/anxious  Objective:      /92 (BP Location: Left arm, Patient Position: Sitting, Cuff Size: Adult)   Pulse 80   Temp 97 6 °F (36 4 °C) (Tympanic)   Resp 16   Ht 5' 9" (1 753 m)   Wt 84 9 kg (187 lb 3 2 oz)   SpO2 98%   BMI 27 64 kg/m²          Physical Exam  Vitals and nursing note reviewed  Constitutional:       Appearance: Normal appearance  HENT:      Head: Normocephalic and atraumatic  Eyes:      Pupils: Pupils are equal, round, and reactive to light  Cardiovascular:      Rate and Rhythm: Normal rate and regular rhythm  Heart sounds: No murmur heard  Pulmonary:      Effort: Pulmonary effort is normal       Breath sounds: Normal breath sounds  Abdominal:      General: Bowel sounds are normal  There is no distension  Palpations: Abdomen is soft  Tenderness: There is no abdominal tenderness  Musculoskeletal:         General: No swelling, tenderness or deformity  Normal range of motion  Cervical back: Normal range of motion  Right lower leg: No edema  Left lower leg: No edema  Skin:     General: Skin is warm and dry  Findings: No rash  Neurological:      Mental Status: He is alert     Psychiatric:         Mood and Affect: Mood normal

## 2022-03-07 LAB
ALBUMIN SERPL-MCNC: 4.5 G/DL (ref 3.6–5.1)
ALBUMIN/GLOB SERPL: 2 (CALC) (ref 1–2.5)
ALP SERPL-CCNC: 72 U/L (ref 36–130)
ALT SERPL-CCNC: 28 U/L (ref 9–46)
AST SERPL-CCNC: 22 U/L (ref 10–40)
BILIRUB SERPL-MCNC: 0.7 MG/DL (ref 0.2–1.2)
BUN SERPL-MCNC: 13 MG/DL (ref 7–25)
BUN/CREAT SERPL: NORMAL (CALC) (ref 6–22)
CALCIUM SERPL-MCNC: 9.5 MG/DL (ref 8.6–10.3)
CHLORIDE SERPL-SCNC: 105 MMOL/L (ref 98–110)
CHOLEST SERPL-MCNC: 148 MG/DL
CHOLEST/HDLC SERPL: 3.4 (CALC)
CO2 SERPL-SCNC: 30 MMOL/L (ref 20–32)
CREAT SERPL-MCNC: 0.94 MG/DL (ref 0.6–1.35)
GLOBULIN SER CALC-MCNC: 2.3 G/DL (CALC) (ref 1.9–3.7)
GLUCOSE SERPL-MCNC: 91 MG/DL (ref 65–99)
HCV AB S/CO SERPL IA: 0.21
HCV AB SERPL QL IA: NORMAL
HDLC SERPL-MCNC: 43 MG/DL
LDLC SERPL CALC-MCNC: 68 MG/DL (CALC)
NONHDLC SERPL-MCNC: 105 MG/DL (CALC)
POTASSIUM SERPL-SCNC: 4.2 MMOL/L (ref 3.5–5.3)
PROT SERPL-MCNC: 6.8 G/DL (ref 6.1–8.1)
SL AMB EGFR AFRICAN AMERICAN: 111 ML/MIN/1.73M2
SL AMB EGFR NON AFRICAN AMERICAN: 96 ML/MIN/1.73M2
SODIUM SERPL-SCNC: 141 MMOL/L (ref 135–146)
TRIGL SERPL-MCNC: 282 MG/DL

## 2022-03-14 DIAGNOSIS — E78.2 MIXED HYPERLIPIDEMIA: ICD-10-CM

## 2022-03-14 RX ORDER — ROSUVASTATIN CALCIUM 10 MG/1
TABLET, COATED ORAL
Qty: 30 TABLET | Refills: 5 | Status: SHIPPED | OUTPATIENT
Start: 2022-03-14

## 2022-05-24 ENCOUNTER — TELEMEDICINE (OUTPATIENT)
Dept: FAMILY MEDICINE CLINIC | Facility: CLINIC | Age: 47
End: 2022-05-24
Payer: COMMERCIAL

## 2022-05-24 DIAGNOSIS — J01.00 ACUTE NON-RECURRENT MAXILLARY SINUSITIS: Primary | ICD-10-CM

## 2022-05-24 PROBLEM — U07.1 COVID-19: Status: RESOLVED | Noted: 2022-01-07 | Resolved: 2022-05-24

## 2022-05-24 PROCEDURE — 99213 OFFICE O/P EST LOW 20 MIN: CPT | Performed by: FAMILY MEDICINE

## 2022-05-24 RX ORDER — GUAIFENESIN 600 MG
1200 TABLET, EXTENDED RELEASE 12 HR ORAL EVERY 12 HOURS SCHEDULED
Qty: 14 TABLET | Refills: 0
Start: 2022-05-24

## 2022-05-24 RX ORDER — AZITHROMYCIN 250 MG/1
TABLET, FILM COATED ORAL
Qty: 6 TABLET | Refills: 0 | Status: SHIPPED | OUTPATIENT
Start: 2022-05-24 | End: 2022-05-29

## 2022-05-24 NOTE — PROGRESS NOTES
Chief Complaint   Patient presents with    Sinus Problem    Virtual Regular Visit     Health Maintenance   Topic Date Due    COVID-19 Vaccine (1) Never done    Colorectal Cancer Screening  Never done    Annual Physical  01/22/2022    Pneumococcal Vaccine: Pediatrics (0 to 5 Years) and At-Risk Patients (6 to 59 Years) (2 - PCV) 01/22/2022    Depression Screening  02/22/2023    BMI: Followup Plan  02/22/2023    BMI: Adult  02/22/2023    DTaP,Tdap,and Td Vaccines (2 - Td or Tdap) 09/01/2026    HIV Screening  Completed    Hepatitis C Screening  Completed    Influenza Vaccine  Completed    HIB Vaccine  Aged Out    Hepatitis B Vaccine  Aged Out    IPV Vaccine  Aged Out    Hepatitis A Vaccine  Aged Out    Meningococcal ACWY Vaccine  Aged Out    HPV Vaccine  Aged Out       Virtual Regular Visit    Verification of patient location:    Patient is located in the following state in which I hold an active license [place]      Assessment/Plan:    Problem List Items Addressed This Visit        Respiratory    Acute maxillary sinusitis - Primary     Recommended to repeat at-home Covid test and call with results  Will start on Zithromax for 5 days  Recommended to increase fluid intake  Take Mucinex 1200 mg twice daily for congestion  Call office if symptoms persist or worsen  Relevant Medications    azithromycin (Zithromax) 250 mg tablet    guaiFENesin (MUCINEX) 600 mg 12 hr tablet               Reason for visit is   Chief Complaint   Patient presents with    Sinus Problem    Virtual Regular Visit        Encounter provider Cely Cortez MD    Provider located at [address] [id number]      Recent Visits  No visits were found meeting these conditions    Showing recent visits within past 7 days and meeting all other requirements  Today's Visits  Date Type Provider Dept   05/24/22 Valdo Steve MD [address] [facility] [place] today's visits and meeting all other requirements  Future Appointments  No visits were found meeting these conditions  Showing future appointments within next 150 days and meeting all other requirements       The patient was identified by name and date of birth  Kavon Berger was informed that this is a telemedicine visit and that the visit is being conducted through [facility] and patient was informed this is a secure, HIPAA-complaint platform  He agrees to proceed     My office door was closed  No one else was in the room  He acknowledged consent and understanding of privacy and security of the video platform  The patient has agreed to participate and understands they can discontinue the visit at any time  Patient is aware this is a billable service  Subjective  Kavon Berger is a 51 y o  male       HPI   Patient c/o nasal congestion, sinus pressure, swollen gland, mild sore throat, headache for the past few days  Denies fever, chills  No cough, no shortness of breath  Patient's daughter was diagnosed with COVID -19 virus infection 1 week ago  Patient did COVID test at home last week and it was was negative  He has not been taking any allergy medications  He states that does not like to use nasal sprays  Current smoker  Past Medical History:   Diagnosis Date    Allergic     Ho's esophagus     Cervical disc disorder     Eczema     GERD (gastroesophageal reflux disease)     Hyperlipidemia     Migraine     Streptococcal pharyngitis        Past Surgical History:   Procedure Laterality Date    APPENDECTOMY      US GUIDED MSK PROCEDURE  7/31/2020       Current Outpatient Medications   Medication Sig Dispense Refill    azithromycin (Zithromax) 250 mg tablet Take 2 tablets (500 mg total) by mouth daily for 1 day, THEN 1 tablet (250 mg total) daily for 4 days   6 tablet 0    guaiFENesin (MUCINEX) 600 mg 12 hr tablet Take 2 tablets (1,200 mg total) by mouth every 12 (twelve) hours 14 tablet 0    latanoprost (XALATAN) 0 005 % ophthalmic solution INSTILL 1 DROP INTO BOTH EYES AT BEDTIME, STOCK SIZE 2 5ML  3    pantoprazole (PROTONIX) 40 mg tablet TAKE 1 TABLET BY MOUTH EVERY DAY 30 tablet 6    rosuvastatin (CRESTOR) 10 MG tablet TAKE 1 TABLET BY MOUTH EVERY DAY 30 tablet 5     No current facility-administered medications for this visit  No Known Allergies    Review of Systems   Constitutional: Positive for fatigue  Negative for activity change, appetite change, chills (mild) and fever  HENT: Positive for congestion, sinus pressure and sore throat  Negative for nosebleeds and trouble swallowing  Eyes: Negative  Respiratory: Negative for cough, chest tightness, shortness of breath and wheezing  Gastrointestinal: Negative for abdominal pain, diarrhea, nausea and vomiting  Musculoskeletal: Negative for myalgias  Neurological: Positive for headaches  Negative for dizziness  Hematological: Negative  Video Exam    There were no vitals filed for this visit  Physical Exam  Vitals and nursing note reviewed  Constitutional:       Appearance: Normal appearance  HENT:      Head: Normocephalic and atraumatic  Nose: Congestion present  Eyes:      Conjunctiva/sclera: Conjunctivae normal       Pupils: Pupils are equal, round, and reactive to light  Pulmonary:      Effort: Pulmonary effort is normal       Breath sounds: No wheezing  Musculoskeletal:      Cervical back: Normal range of motion and neck supple  Skin:     Findings: No rash  Neurological:      Mental Status: He is alert  Psychiatric:         Mood and Affect: Mood normal           I spent 15 minutes directly with the patient during this visit    VIRTUAL VISIT DISCLAIMER      Donodell Charmaine verbally agrees to participate in [employer]   Pt is aware that [employer] could be limited without vital signs or the ability to perform a full hands-on physical Horace Harper understands he or the provider may request at any time to terminate the video visit and request the patient to seek care or treatment in person

## 2022-05-24 NOTE — ASSESSMENT & PLAN NOTE
Recommended to repeat at-home Covid test and call with results  Will start on Zithromax for 5 days  Recommended to increase fluid intake  Take Mucinex 1200 mg twice daily for congestion  Call office if symptoms persist or worsen

## 2022-07-16 DIAGNOSIS — K21.9 GASTROESOPHAGEAL REFLUX DISEASE WITHOUT ESOPHAGITIS: ICD-10-CM

## 2022-07-16 RX ORDER — PANTOPRAZOLE SODIUM 40 MG/1
TABLET, DELAYED RELEASE ORAL
Qty: 30 TABLET | Refills: 6 | Status: SHIPPED | OUTPATIENT
Start: 2022-07-16

## 2022-08-27 PROBLEM — Z01.818 PREPROCEDURAL GENERAL PHYSICAL EXAMINATION: Status: RESOLVED | Noted: 2019-02-19 | Resolved: 2022-08-27

## 2022-08-27 PROBLEM — J01.00 ACUTE MAXILLARY SINUSITIS: Status: RESOLVED | Noted: 2022-05-24 | Resolved: 2022-08-27

## 2022-08-30 ENCOUNTER — OFFICE VISIT (OUTPATIENT)
Dept: FAMILY MEDICINE CLINIC | Facility: CLINIC | Age: 47
End: 2022-08-30
Payer: COMMERCIAL

## 2022-08-30 VITALS
WEIGHT: 183 LBS | DIASTOLIC BLOOD PRESSURE: 80 MMHG | HEIGHT: 69 IN | BODY MASS INDEX: 27.11 KG/M2 | SYSTOLIC BLOOD PRESSURE: 128 MMHG | HEART RATE: 60 BPM | TEMPERATURE: 98.4 F | RESPIRATION RATE: 16 BRPM | OXYGEN SATURATION: 98 %

## 2022-08-30 DIAGNOSIS — Z12.11 SCREENING FOR COLON CANCER: ICD-10-CM

## 2022-08-30 DIAGNOSIS — M54.2 CHRONIC NECK AND BACK PAIN: ICD-10-CM

## 2022-08-30 DIAGNOSIS — M54.9 CHRONIC NECK AND BACK PAIN: ICD-10-CM

## 2022-08-30 DIAGNOSIS — G89.29 CHRONIC NECK AND BACK PAIN: ICD-10-CM

## 2022-08-30 DIAGNOSIS — E78.2 MIXED HYPERLIPIDEMIA: ICD-10-CM

## 2022-08-30 DIAGNOSIS — R03.0 BLOOD PRESSURE INCREASE DIASTOLIC: ICD-10-CM

## 2022-08-30 DIAGNOSIS — F17.210 CIGARETTE NICOTINE DEPENDENCE WITHOUT COMPLICATION: ICD-10-CM

## 2022-08-30 DIAGNOSIS — K22.70 BARRETT'S ESOPHAGUS WITHOUT DYSPLASIA: ICD-10-CM

## 2022-08-30 DIAGNOSIS — K21.9 GERD WITHOUT ESOPHAGITIS: ICD-10-CM

## 2022-08-30 DIAGNOSIS — Z00.00 WELL ADULT EXAM: Primary | ICD-10-CM

## 2022-08-30 PROCEDURE — 99396 PREV VISIT EST AGE 40-64: CPT | Performed by: FAMILY MEDICINE

## 2022-08-30 NOTE — ASSESSMENT & PLAN NOTE
Continue PPI therapy  EGD done in April 2022  Follow-up with gastroenterology Dr Ankit Pelletier for Ho's esophagus surveillance

## 2022-08-30 NOTE — ASSESSMENT & PLAN NOTE
Total cholesterol, LDL improved  Continue Crestor 10 mg daily  Triglycerides are elevated at 282 from March 2022  Goal < 150  Recommended to decrease alcohol consumption  Continue regular exercise  Follow a low-cholesterol, low-fat diet  Check CMP, lipid panel

## 2022-08-30 NOTE — ASSESSMENT & PLAN NOTE
Recommended to follow a well-balanced diet, regular exercise, regular dentist visits  Patient refusing COVID vaccination  Recommended to schedule screening colonoscopy

## 2022-08-30 NOTE — ASSESSMENT & PLAN NOTE
At the last visit in February 2022 blood pressure was 126/92  BP today 128/80  Recommended to follow a low-sodium diet  avoid processed foods  Continue regular exercise

## 2022-08-30 NOTE — PROGRESS NOTES
Chief Complaint   Patient presents with    Physical Exam     Health Maintenance   Topic Date Due    COVID-19 Vaccine (1) Never done    Colorectal Cancer Screening  Never done    Annual Physical  01/22/2022    Pneumococcal Vaccine: Pediatrics (0 to 5 Years) and At-Risk Patients (6 to 59 Years) (2 - PCV) 01/22/2022    Influenza Vaccine (1) 09/01/2022    BMI: Followup Plan  02/22/2023    Depression Screening  08/30/2023    BMI: Adult  08/30/2023    DTaP,Tdap,and Td Vaccines (2 - Td or Tdap) 09/01/2026    HIV Screening  Completed    Hepatitis C Screening  Completed    HIB Vaccine  Aged Out    Hepatitis B Vaccine  Aged Out    IPV Vaccine  Aged Out    Hepatitis A Vaccine  Aged Out    Meningococcal ACWY Vaccine  Aged Out    HPV Vaccine  Aged Out       Tobacco Cessation Counseling: Tobacco cessation counseling was provided  The patient is sincerely urged to quit consumption of tobacco  He is not ready to quit tobacco      Assessment/Plan:    Well adult exam  Recommended to follow a well-balanced diet, regular exercise, regular dentist visits  Patient refusing COVID vaccination  Recommended to schedule screening colonoscopy  Hyperlipidemia  Total cholesterol, LDL improved  Continue Crestor 10 mg daily  Triglycerides are elevated at 282 from March 2022  Goal < 150  Recommended to decrease alcohol consumption  Continue regular exercise  Follow a low-cholesterol, low-fat diet  Check CMP, lipid panel  GERD without esophagitis  Continue pantoprazole 40 mg daily  Recommended to avoid caffeine, acidic, fried foods  Ho's esophagus without dysplasia  Continue PPI therapy  EGD done in April 2022  Follow-up with gastroenterology Dr Anam Albarran for Ho's esophagus surveillance  Cigarette nicotine dependence without complication  Counseled patient on smoking cessation      Chronic neck and back pain  Schedule visit with pain management Dr Edgardo Gaston to discussed treatment options  Blood pressure increase diastolic  At the last visit in February 2022 blood pressure was 126/92  BP today 128/80  Recommended to follow a low-sodium diet  avoid processed foods  Continue regular exercise  Schedule follow-up visit in 6 months  Diagnoses and all orders for this visit:    Well adult exam  -     CBC and differential; Future    Mixed hyperlipidemia  -     Comprehensive metabolic panel; Future  -     Lipid panel; Future    GERD without esophagitis    Ho's esophagus without dysplasia  -     CBC and differential; Future  -     Comprehensive metabolic panel; Future    Cigarette nicotine dependence without complication  -     CBC and differential; Future    Chronic neck and back pain  -     Ambulatory Referral to Pain Management; Future    Screening for colon cancer  -     Ambulatory referral for colonoscopy; Future    Blood pressure increase diastolic          Subjective:      Patient ID: Sean Jones is a 51 y o  male  HPI     Patient presents for physical exam       He works as a [employer]  full-time  PMHx: GERD, Ho's esophagus, Hyperlipidemia, seasonal allergies, chronic neck pain, cervical spinal stenosis  Reviewed current medications, blood test results from March 2022  Fasting blood sugar 91, creatinine 0 94, LFTs- within normal range  Lipid panel improved after starting on Crestor 10 mg daily  Cholesterol 148, HDL 43, triglycerides 282  LDL 68 ( was 148 in July 2021)    Patient denies chest pain, shortness of breath, dizziness  He plays softball with his daughter  Reports recent flare of his chronic neck and thoracic back pain  Denies tingling, numbness in arms, legs  He was previously seen by Dr Maya Pappas, would like to schedule follow-up visit to discuss treatment options  GERD/ Ho's esophagus -  By gastroenterology Dr Crista Starr had EGD done in April 2022 which showed small hiatal hernia    Recommended to repeat EGD in 3 years  Currently taking pantoprazole 40 mg daily  Patient states that he develops heartburn when skips taking medications skips medication  At the last visit in February 5900 diastolic blood pressure was elevated  Blood pressure was 126/92  Admits eating lunch meat, salty foods  Tobacco use - patient usually smokes cigarettes 1/2 ppd but has not been smoking cigarettes for the last 3 days  Drinks hard liquor on weekends  Denies family history of colon cancer, prostate cancer  Refusing scheduling COVID vaccination  The following portions of the patient's history were reviewed and updated as appropriate: allergies, current medications, past family history, past medical history, past surgical history and problem list     Review of Systems   Constitutional: Negative for activity change, appetite change, chills, fatigue and fever  HENT: Negative for congestion, ear pain, hearing loss, sore throat and trouble swallowing  Eyes: Negative  Respiratory: Negative for cough, chest tightness, shortness of breath and wheezing  Cardiovascular: Negative for chest pain, palpitations and leg swelling  Gastrointestinal: Negative for abdominal pain, blood in stool, constipation, diarrhea, nausea and vomiting  Occasional heartburn   Genitourinary: Negative for difficulty urinating, dysuria, flank pain and hematuria  Musculoskeletal: Positive for back pain and neck pain (chronic)  Negative for arthralgias (chronic thoracic back pain ), gait problem and joint swelling  Skin: Negative for rash  Neurological: Negative for dizziness, syncope, numbness and headaches  Hematological: Negative  Psychiatric/Behavioral: Negative for dysphoric mood and sleep disturbance  The patient is not nervous/anxious            Objective:      /80 (BP Location: Left arm, Patient Position: Sitting)   Pulse 60   Temp 98 4 °F (36 9 °C) (Tympanic)   Resp 16   Ht 5' 9" (1 753 m)   Wt 83 kg (183 lb)   SpO2 98%   BMI 27 02 kg/m²          Physical Exam  Vitals and nursing note reviewed  Constitutional:       Appearance: Normal appearance  HENT:      Head: Normocephalic and atraumatic  Eyes:      Conjunctiva/sclera: Conjunctivae normal       Pupils: Pupils are equal, round, and reactive to light  Neck:      Vascular: No carotid bruit  Cardiovascular:      Rate and Rhythm: Normal rate and regular rhythm  Heart sounds: No murmur heard  Pulmonary:      Effort: Pulmonary effort is normal       Breath sounds: Normal breath sounds  Abdominal:      General: Bowel sounds are normal  There is no distension  Palpations: Abdomen is soft  Tenderness: There is no abdominal tenderness  Musculoskeletal:         General: No swelling or tenderness  Normal range of motion  Cervical back: Normal range of motion and neck supple  Right lower leg: No edema  Left lower leg: No edema  Skin:     General: Skin is warm and dry  Findings: No rash  Neurological:      General: No focal deficit present  Mental Status: He is alert  Motor: No weakness        Coordination: Coordination normal    Psychiatric:         Mood and Affect: Mood normal

## 2022-09-14 DIAGNOSIS — E78.2 MIXED HYPERLIPIDEMIA: ICD-10-CM

## 2022-09-14 RX ORDER — ROSUVASTATIN CALCIUM 10 MG/1
TABLET, COATED ORAL
Qty: 90 TABLET | Refills: 1 | Status: SHIPPED | OUTPATIENT
Start: 2022-09-14

## 2022-09-22 LAB
ALBUMIN SERPL-MCNC: 4.5 G/DL (ref 3.6–5.1)
ALBUMIN/GLOB SERPL: 1.8 (CALC) (ref 1–2.5)
ALP SERPL-CCNC: 75 U/L (ref 36–130)
ALT SERPL-CCNC: 24 U/L (ref 9–46)
AST SERPL-CCNC: 20 U/L (ref 10–40)
BASOPHILS # BLD AUTO: 16 CELLS/UL (ref 0–200)
BASOPHILS NFR BLD AUTO: 0.2 %
BILIRUB SERPL-MCNC: 0.5 MG/DL (ref 0.2–1.2)
BUN SERPL-MCNC: 21 MG/DL (ref 7–25)
BUN/CREAT SERPL: NORMAL (CALC) (ref 6–22)
CALCIUM SERPL-MCNC: 9.4 MG/DL (ref 8.6–10.3)
CHLORIDE SERPL-SCNC: 105 MMOL/L (ref 98–110)
CHOLEST SERPL-MCNC: 183 MG/DL
CHOLEST/HDLC SERPL: 3.5 (CALC)
CO2 SERPL-SCNC: 29 MMOL/L (ref 20–32)
CREAT SERPL-MCNC: 1.06 MG/DL (ref 0.6–1.29)
EOSINOPHIL # BLD AUTO: 81 CELLS/UL (ref 15–500)
EOSINOPHIL NFR BLD AUTO: 1 %
ERYTHROCYTE [DISTWIDTH] IN BLOOD BY AUTOMATED COUNT: 12.9 % (ref 11–15)
GFR/BSA.PRED SERPLBLD CYS-BASED-ARV: 87 ML/MIN/1.73M2
GLOBULIN SER CALC-MCNC: 2.5 G/DL (CALC) (ref 1.9–3.7)
GLUCOSE SERPL-MCNC: 92 MG/DL (ref 65–99)
HCT VFR BLD AUTO: 47.8 % (ref 38.5–50)
HDLC SERPL-MCNC: 53 MG/DL
HGB BLD-MCNC: 15.9 G/DL (ref 13.2–17.1)
LDLC SERPL CALC-MCNC: 87 MG/DL (CALC)
LYMPHOCYTES # BLD AUTO: 1855 CELLS/UL (ref 850–3900)
LYMPHOCYTES NFR BLD AUTO: 22.9 %
MCH RBC QN AUTO: 31.1 PG (ref 27–33)
MCHC RBC AUTO-ENTMCNC: 33.3 G/DL (ref 32–36)
MCV RBC AUTO: 93.4 FL (ref 80–100)
MONOCYTES # BLD AUTO: 583 CELLS/UL (ref 200–950)
MONOCYTES NFR BLD AUTO: 7.2 %
NEUTROPHILS # BLD AUTO: 5565 CELLS/UL (ref 1500–7800)
NEUTROPHILS NFR BLD AUTO: 68.7 %
NONHDLC SERPL-MCNC: 130 MG/DL (CALC)
PLATELET # BLD AUTO: 239 THOUSAND/UL (ref 140–400)
PMV BLD REES-ECKER: 11.9 FL (ref 7.5–12.5)
POTASSIUM SERPL-SCNC: 4.5 MMOL/L (ref 3.5–5.3)
PROT SERPL-MCNC: 7 G/DL (ref 6.1–8.1)
RBC # BLD AUTO: 5.12 MILLION/UL (ref 4.2–5.8)
SODIUM SERPL-SCNC: 141 MMOL/L (ref 135–146)
TRIGL SERPL-MCNC: 322 MG/DL
WBC # BLD AUTO: 8.1 THOUSAND/UL (ref 3.8–10.8)

## 2022-09-26 DIAGNOSIS — E78.2 MIXED HYPERLIPIDEMIA: Primary | ICD-10-CM

## 2022-10-03 ENCOUNTER — OFFICE VISIT (OUTPATIENT)
Dept: PAIN MEDICINE | Facility: CLINIC | Age: 47
End: 2022-10-03
Payer: COMMERCIAL

## 2022-10-03 VITALS
BODY MASS INDEX: 27.25 KG/M2 | SYSTOLIC BLOOD PRESSURE: 137 MMHG | HEIGHT: 69 IN | HEART RATE: 55 BPM | WEIGHT: 184 LBS | DIASTOLIC BLOOD PRESSURE: 89 MMHG

## 2022-10-03 DIAGNOSIS — M79.18 MYOFASCIAL PAIN SYNDROME: ICD-10-CM

## 2022-10-03 DIAGNOSIS — G89.29 CHRONIC BILATERAL LOW BACK PAIN WITHOUT SCIATICA: ICD-10-CM

## 2022-10-03 DIAGNOSIS — M54.6 CHRONIC RIGHT-SIDED THORACIC BACK PAIN: ICD-10-CM

## 2022-10-03 DIAGNOSIS — M47.812 CERVICAL SPONDYLOSIS WITHOUT MYELOPATHY: ICD-10-CM

## 2022-10-03 DIAGNOSIS — M54.9 CHRONIC NECK AND BACK PAIN: ICD-10-CM

## 2022-10-03 DIAGNOSIS — M54.2 CHRONIC NECK AND BACK PAIN: ICD-10-CM

## 2022-10-03 DIAGNOSIS — M54.12 CERVICAL RADICULOPATHY: Primary | ICD-10-CM

## 2022-10-03 DIAGNOSIS — M54.50 CHRONIC BILATERAL LOW BACK PAIN WITHOUT SCIATICA: ICD-10-CM

## 2022-10-03 DIAGNOSIS — G89.29 CHRONIC RIGHT-SIDED THORACIC BACK PAIN: ICD-10-CM

## 2022-10-03 DIAGNOSIS — G89.29 CHRONIC NECK AND BACK PAIN: ICD-10-CM

## 2022-10-03 PROCEDURE — 99214 OFFICE O/P EST MOD 30 MIN: CPT | Performed by: NURSE PRACTITIONER

## 2022-10-03 RX ORDER — TIZANIDINE 2 MG/1
2 TABLET ORAL EVERY 8 HOURS PRN
Qty: 90 TABLET | Refills: 1 | Status: SHIPPED | OUTPATIENT
Start: 2022-10-03

## 2022-10-03 NOTE — PROGRESS NOTES
Assessment:  1  Cervical radiculopathy    2  Cervical spondylosis without myelopathy    3  Myofascial pain syndrome    4  Chronic bilateral low back pain without sciatica    5  Chronic right-sided thoracic back pain    6  Chronic neck and back pain        Plan:  1  Patient will be scheduled for a left C6-7 cervical epidural steroid injection to address the inflammatory component the patient's pain  Complete risks and benefits including bleeding, infection, tissue reaction, nerve injury and allergic reaction were discussed  The patient was agreeable and verbalized an understanding  2  I will trial the patient on tizanidine 2 mg q 8 hours p r n  myofascial pain  I advised the patient that they should not drive or operate machinery while on this medication until they see how it affects them, as it could cause lethargy and mental cloudyness  I advised the patient to call our office if they experience any side effects or issues with the medication changes  The patient verbalized an understanding  3  Patient does not wish to initiate on gabapentin at this time  Can consider a trial in the future   4  Patient is non opioid therapy secondary to marijuana use  5  Patient not interested in physical therapy  Continue with home exercise program  6  Follow up after procedure or sooner if needed     History of Present Illness: The patient is a 51 y o  male last seen on 1/8/20 who presents for a follow up office visit in regards to chronic neck pain that radiates into the left upper extremity and between the scapula secondary to cervical degenerative disc disease, cervical stenosis and cervical radiculopathy  The patient denies any right-sided symptoms, bowel or bladder incontinence or balance issues  Patient did have good relief with right C4-6 RFA in the past   He is not currently taking any medication for his symptoms  He has tried physical therapy in the past without much relief      Patient rates his pain a 6/10 on the numeric pain rating scale  He constantly has pain which is described as cramping and shooting    I have personally reviewed and/or updated the patient's past medical history, past surgical history, family history, social history, current medications, allergies, and vital signs today  Review of Systems:    Review of Systems   Respiratory: Negative for shortness of breath  Cardiovascular: Negative for chest pain  Gastrointestinal: Negative for constipation, diarrhea, nausea and vomiting  Musculoskeletal: Negative for arthralgias, gait problem, joint swelling and myalgias  Skin: Negative for rash  Neurological: Negative for dizziness, seizures and weakness  All other systems reviewed and are negative  Past Medical History:   Diagnosis Date    Allergic     Ho's esophagus     Cervical disc disorder     Eczema     GERD (gastroesophageal reflux disease)     Hyperlipidemia     Migraine     Streptococcal pharyngitis        Past Surgical History:   Procedure Laterality Date    APPENDECTOMY      US GUIDED MSK PROCEDURE  7/31/2020       Family History   Problem Relation Age of Onset    No Known Problems Mother        Social History     Occupational History    Not on file   Tobacco Use    Smoking status: Current Every Day Smoker     Packs/day: 0 25    Smokeless tobacco: Never Used   Vaping Use    Vaping Use: Never used   Substance and Sexual Activity    Alcohol use: Yes     Alcohol/week: 6 0 standard drinks     Types: 6 Cans of beer per week     Comment: occasional    Drug use: Yes     Types:  Other, Marijuana     Comment: medical marijuana    Sexual activity: Not on file         Current Outpatient Medications:     latanoprost (XALATAN) 0 005 % ophthalmic solution, INSTILL 1 DROP INTO BOTH EYES AT BEDTIME, STOCK SIZE 2 5ML, Disp: , Rfl: 3    pantoprazole (PROTONIX) 40 mg tablet, TAKE 1 TABLET BY MOUTH EVERY DAY, Disp: 30 tablet, Rfl: 6    rosuvastatin (CRESTOR) 10 MG tablet, TAKE 1 TABLET BY MOUTH EVERY DAY, Disp: 90 tablet, Rfl: 1    tiZANidine (ZANAFLEX) 2 mg tablet, Take 1 tablet (2 mg total) by mouth every 8 (eight) hours as needed for muscle spasms, Disp: 90 tablet, Rfl: 1    No Known Allergies    Physical Exam:    /89   Pulse 55   Ht 5' 9" (1 753 m)   Wt 83 5 kg (184 lb)   BMI 27 17 kg/m²     Constitutional:normal, well developed, well nourished, alert, in no distress and non-toxic and no overt pain behavior  Eyes:anicteric  HEENT:grossly intact  Neck:supple, symmetric, trachea midline and no masses   Pulmonary:even and unlabored  Cardiovascular:No edema or pitting edema present  Skin:Normal without rashes or lesions and well hydrated  Psychiatric:Mood and affect appropriate  Neurologic:Cranial Nerves II-XII grossly intact  Musculoskeletal:normal gait  Left cervical paraspinal and trapezii musculature tender to palpation  Bilateral upper extremity strength 5/5 in all muscle groups  Positive Spurling's bilaterally  Negative Waters's reflex  Negative Tinel's over the bilateral wrists and cubital tunnels      Imaging  FL spine and pain procedure    (Results Pending)     MRI CERVICAL SPINE WITHOUT CONTRAST   INDICATION: [id number] [id number]: Radiculopathy, cervical region  Neck pain   COMPARISON: None  TECHNIQUE: Sagittal T1, sagittal T2, sagittal inversion recovery, axial T2, axial 2D merge patient prepared for and monitored during this exam by anesthesia personnel  IMAGE QUALITY: Diagnostic   FINDINGS:   ALIGNMENT: Normal alignment of the cervical spine  No compression fracture  No subluxation  No scoliosis  Minor loss of disc height C4-5, C5-6 and C6-C7  MARROW SIGNAL: Normal marrow signal is identified within the visualized bony structures  No discrete marrow lesion  CERVICAL AND VISUALIZED THORACIC CORD: Normal signal within the visualized cord     PREVERTEBRAL AND PARASPINAL SOFT TISSUES: Normal    VISUALIZED POSTERIOR FOSSA: The visualized posterior fossa demonstrates no abnormal signal    CERVICAL DISC SPACES:   C2-C3: Normal    C3-C4: Normal    C4-C5: Minimal facet arthrosis   C5-C6: Slight reduction disc height, circumferential bulge with small marginal osteophytes  Minor bilateral uncinate arthrosis  Minor right foraminal stenosis  C6-C7: Decreased disc height, circumferential bulge, minor bilateral facet and uncinate arthrosis  Moderate bilateral foraminal stenosis  C7-T1: Normal    UPPER THORACIC DISC SPACES: Normal    IMPRESSION:   Minor, noncompressive degenerative changes of the cervical spine most pronounced at C5-C6 and C6-C7         Orders Placed This Encounter   Procedures    FL spine and pain procedure

## 2022-10-11 ENCOUNTER — HOSPITAL ENCOUNTER (OUTPATIENT)
Dept: RADIOLOGY | Facility: CLINIC | Age: 47
Discharge: HOME | End: 2022-10-11
Payer: COMMERCIAL

## 2022-10-11 VITALS
TEMPERATURE: 98.6 F | RESPIRATION RATE: 20 BRPM | OXYGEN SATURATION: 99 % | SYSTOLIC BLOOD PRESSURE: 141 MMHG | HEART RATE: 65 BPM | DIASTOLIC BLOOD PRESSURE: 94 MMHG

## 2022-10-11 DIAGNOSIS — M54.12 CERVICAL RADICULOPATHY: ICD-10-CM

## 2022-10-11 PROCEDURE — 62321 NJX INTERLAMINAR CRV/THRC: CPT | Performed by: ANESTHESIOLOGY

## 2022-10-11 RX ORDER — PAPAVERINE HCL 150 MG
10 CAPSULE, EXTENDED RELEASE ORAL ONCE
Status: COMPLETED | OUTPATIENT
Start: 2022-10-11 | End: 2022-10-11

## 2022-10-11 RX ADMIN — IOHEXOL 1 ML: 300 INJECTION, SOLUTION INTRAVENOUS at 14:17

## 2022-10-11 RX ADMIN — DEXAMETHASONE SODIUM PHOSPHATE 10 MG: 10 INJECTION, SOLUTION INTRAMUSCULAR; INTRAVENOUS at 14:17

## 2022-10-11 NOTE — H&P
History of Present Illness: The patient is a 49 y o  male who presents with complaints of neck and arm pain      Past Medical History:   Diagnosis Date   • Allergic    • Ho's esophagus    • Cervical disc disorder    • Eczema    • GERD (gastroesophageal reflux disease)    • Hyperlipidemia    • Migraine    • Streptococcal pharyngitis        Past Surgical History:   Procedure Laterality Date   • APPENDECTOMY     • US GUIDED MSK PROCEDURE  7/31/2020         Current Outpatient Medications:   •  latanoprost (XALATAN) 0 005 % ophthalmic solution, INSTILL 1 DROP INTO BOTH EYES AT BEDTIME, STOCK SIZE 2 5ML, Disp: , Rfl: 3  •  pantoprazole (PROTONIX) 40 mg tablet, TAKE 1 TABLET BY MOUTH EVERY DAY, Disp: 30 tablet, Rfl: 6  •  rosuvastatin (CRESTOR) 10 MG tablet, TAKE 1 TABLET BY MOUTH EVERY DAY, Disp: 90 tablet, Rfl: 1  •  tiZANidine (ZANAFLEX) 2 mg tablet, Take 1 tablet (2 mg total) by mouth every 8 (eight) hours as needed for muscle spasms, Disp: 90 tablet, Rfl: 1    No Known Allergies    Physical Exam:   Vitals:    10/11/22 1402   BP: 117/81   Pulse: 75   Resp: 20   Temp: 98 6 °F (37 °C)   SpO2: 97%     General: Awake, Alert, Oriented x 3, Mood and affect appropriate  Respiratory: Respirations even and unlabored  Cardiovascular: Peripheral pulses intact; no edema  Musculoskeletal Exam:  Bilateral cervical paraspinals tender to palpation    ASA Score: 2         Assessment:  Cervical radiculopathy    Plan: Left C6-7 SHEILA

## 2022-10-11 NOTE — DISCHARGE INSTRUCTIONS
Epidural Steroid Injection   WHAT YOU NEED TO KNOW:   An epidural steroid injection (SHASTA) is a procedure to inject steroid medicine into the epidural space  The epidural space is between your spinal cord and vertebrae  Steroids reduce inflammation and fluid buildup in your spine that may be causing pain  You may be given pain medicine along with the steroids  ACTIVITY  Do not drive or operate machinery today  No strenuous activity today - bending, lifting, etc   You may resume normal activites starting tomorrow - start slowly and as tolerated  You may shower today, but no tub baths or hot tubs  You may have numbness for several hours from the local anesthetic  Please use caution and common sense, especially with weight-bearing activities  CARE OF THE INJECTION SITE  If you have soreness or pain, apply ice to the area today (20 minutes on/20 minutes off)  Starting tomorrow, you may use warm, moist heat or ice if needed  You may have an increase or change in your discomfort for 36-48 hours after your treatment  Apply ice and continue with any pain medication you have been prescribed  Notify the [facility] if you have any of the following: redness, drainage, swelling, headache, stiff neck or fever above 100°F     SPECIAL INSTRUCTIONS  Our office will contact you in approximately 7 days for a progress report  MEDICATIONS  Continue to take all routine medications  Our office may have instructed you to hold some medications  As no general anesthesia was used in today's procedure, you should not experience any side effects related to anesthesia  If you are diabetic, the steroids used in today's injection may temporarily increase your blood sugar levels after the first few days after your injection  Please keep a close eye on your sugars and alert the doctor who manages your diabetes if your sugars are significantly high from your baseline or you are symptomatic       If you have a problem specifically related to your procedure, please call our office at [phone]  Problems not related to your procedure should be directed to your primary care physician

## 2022-10-18 ENCOUNTER — TELEPHONE (OUTPATIENT)
Dept: PAIN MEDICINE | Facility: CLINIC | Age: 47
End: 2022-10-18

## 2022-11-14 ENCOUNTER — IMMUNIZATIONS (OUTPATIENT)
Dept: FAMILY MEDICINE CLINIC | Facility: CLINIC | Age: 47
End: 2022-11-14

## 2022-11-14 DIAGNOSIS — Z23 ENCOUNTER FOR IMMUNIZATION: Primary | ICD-10-CM

## 2022-11-21 ENCOUNTER — OFFICE VISIT (OUTPATIENT)
Dept: PAIN MEDICINE | Facility: CLINIC | Age: 47
End: 2022-11-21

## 2022-11-21 VITALS
WEIGHT: 182 LBS | BODY MASS INDEX: 26.96 KG/M2 | HEART RATE: 55 BPM | HEIGHT: 69 IN | SYSTOLIC BLOOD PRESSURE: 129 MMHG | DIASTOLIC BLOOD PRESSURE: 85 MMHG

## 2022-11-21 DIAGNOSIS — M54.2 CHRONIC NECK AND BACK PAIN: ICD-10-CM

## 2022-11-21 DIAGNOSIS — G89.29 CHRONIC NECK AND BACK PAIN: ICD-10-CM

## 2022-11-21 DIAGNOSIS — G89.29 CHRONIC BILATERAL LOW BACK PAIN WITHOUT SCIATICA: ICD-10-CM

## 2022-11-21 DIAGNOSIS — M79.18 MYOFASCIAL PAIN SYNDROME: ICD-10-CM

## 2022-11-21 DIAGNOSIS — M54.12 CERVICAL RADICULOPATHY: Primary | ICD-10-CM

## 2022-11-21 DIAGNOSIS — M54.2 NECK PAIN: ICD-10-CM

## 2022-11-21 DIAGNOSIS — M47.812 CERVICAL SPONDYLOSIS WITHOUT MYELOPATHY: ICD-10-CM

## 2022-11-21 DIAGNOSIS — M54.9 CHRONIC NECK AND BACK PAIN: ICD-10-CM

## 2022-11-21 DIAGNOSIS — M54.50 CHRONIC BILATERAL LOW BACK PAIN WITHOUT SCIATICA: ICD-10-CM

## 2022-11-21 DIAGNOSIS — M48.02 CERVICAL STENOSIS OF SPINE: ICD-10-CM

## 2022-11-21 NOTE — PROGRESS NOTES
Assessment:  1  Cervical radiculopathy    2  Cervical spondylosis without myelopathy    3  Myofascial pain syndrome    4  Cervical stenosis of spine    5  Chronic neck and back pain    6  Chronic bilateral low back pain without sciatica    7  Neck pain        Plan:  1  Offered to schedule the patient for trigger point injections into the left cervical paraspinal and trapezii musculature, however he declines at this time   2  Will discontinue tizanidine secondary to side effects  3  Patient not interested in oral medications at this time as he has a [employer]  and is unable to take a lot of medications secondary to his [employer] license  4  Offered to prescribe the patient a Medrol Dosepak however he declines at this time   5  Offered to schedule the patient physical therapy for myofascial release, however he declines at this time   6  Patient will continue topical heat application, 20 minutes per hour to avoid thermal injury to the skin  He also has a TENs unit at home that he will try  7  We can repeat C6-7 cervical epidural steroid injection should his left upper extremity symptoms return in the future   8  May consider left C4-6 medial branch blocks   9  Follow-up in 8 weeks or sooner if needed    History of Present Illness: The patient is a 49 y o  male last seen on 10/3/22 who presents for a follow up office visit in regards to chronic neck pain that radiates into the left upper extremity secondary to cervical degenerative disc disease, stenosis and radiculopathy  The patient denies right upper extremity symptoms, bowel or bladder incontinence or balance issues  Patient has had C6-7 cervical epidural steroid injection on October 11, 2022 which resolved his left upper extremity symptoms  He does continue with left-sided neck pain that radiates into the trapezius musculature  He did have good relief in the past with right C4-6 RFA    He takes tizanidine 2 mg q 8 hours p r n  but finds it sedating therefore avoids the medication as he is a [employer]   He rates his pain as 0/10 currently  He has pain in the evening and at night which is described as dull aching, cramping and shooting    I have personally reviewed and/or updated the patient's past medical history, past surgical history, family history, social history, current medications, allergies, and vital signs today  Review of Systems:    Review of Systems   Respiratory: Negative for shortness of breath  Cardiovascular: Negative for chest pain  Gastrointestinal: Negative for constipation, diarrhea, nausea and vomiting  Musculoskeletal: Positive for gait problem  Negative for arthralgias, joint swelling and myalgias  Skin: Negative for rash  Neurological: Negative for dizziness, seizures and weakness  All other systems reviewed and are negative  Past Medical History:   Diagnosis Date   • Allergic    • Ho's esophagus    • Cervical disc disorder    • Eczema    • GERD (gastroesophageal reflux disease)    • Hyperlipidemia    • Migraine    • Streptococcal pharyngitis        Past Surgical History:   Procedure Laterality Date   • APPENDECTOMY     • US GUIDED MSK PROCEDURE  7/31/2020       Family History   Problem Relation Age of Onset   • No Known Problems Mother        Social History     Occupational History   • Not on file   Tobacco Use   • Smoking status: Every Day     Packs/day: 0 25     Types: Cigarettes   • Smokeless tobacco: Never   Vaping Use   • Vaping Use: Never used   Substance and Sexual Activity   • Alcohol use: Yes     Alcohol/week: 6 0 standard drinks     Types: 6 Cans of beer per week     Comment: occasional   • Drug use: Yes     Types:  Other, Marijuana     Comment: medical marijuana   • Sexual activity: Not on file         Current Outpatient Medications:   •  latanoprost (XALATAN) 0 005 % ophthalmic solution, INSTILL 1 DROP INTO BOTH EYES AT BEDTIME, STOCK SIZE 2 5ML, Disp: , Rfl: 3  •  pantoprazole (PROTONIX) 40 mg tablet, TAKE 1 TABLET BY MOUTH EVERY DAY, Disp: 30 tablet, Rfl: 6  •  rosuvastatin (CRESTOR) 10 MG tablet, TAKE 1 TABLET BY MOUTH EVERY DAY, Disp: 90 tablet, Rfl: 1  •  tiZANidine (ZANAFLEX) 2 mg tablet, Take 1 tablet (2 mg total) by mouth every 8 (eight) hours as needed for muscle spasms, Disp: 90 tablet, Rfl: 1    No Known Allergies    Physical Exam:    /85   Pulse 55   Ht 5' 9" (1 753 m)   Wt 82 6 kg (182 lb)   BMI 26 88 kg/m²     Constitutional:normal, well developed, well nourished, alert, in no distress and non-toxic and no overt pain behavior  Eyes:anicteric  HEENT:grossly intact  Neck:supple, symmetric, trachea midline and no masses   Pulmonary:even and unlabored  Cardiovascular:No edema or pitting edema present  Skin:Normal without rashes or lesions and well hydrated  Psychiatric:Mood and affect appropriate  Neurologic:Cranial Nerves II-XII grossly intact  Musculoskeletal:normal gait  Bilateral cervical paraspinal and trapezii musculature tender to palpation, left greater than right  Imaging  No orders to display     MRI CERVICAL SPINE WITHOUT CONTRAST   INDICATION: M54 12: Radiculopathy, cervical region  Neck pain   COMPARISON: None  TECHNIQUE: Sagittal T1, sagittal T2, sagittal inversion recovery, axial T2, axial 2D merge patient prepared for and monitored during this exam by anesthesia personnel  IMAGE QUALITY: Diagnostic   FINDINGS:   ALIGNMENT: Normal alignment of the cervical spine  No compression fracture  No subluxation  No scoliosis  Minor loss of disc height C4-5, C5-6 and C6-C7  MARROW SIGNAL: Normal marrow signal is identified within the visualized bony structures  No discrete marrow lesion  CERVICAL AND VISUALIZED THORACIC CORD: Normal signal within the visualized cord     PREVERTEBRAL AND PARASPINAL SOFT TISSUES: Normal    VISUALIZED POSTERIOR FOSSA: The visualized posterior fossa demonstrates no abnormal signal    CERVICAL DISC SPACES:   C2-C3: Normal    C3-C4: Normal  C4-C5: Minimal facet arthrosis   C5-C6: Slight reduction disc height, circumferential bulge with small marginal osteophytes  Minor bilateral uncinate arthrosis  Minor right foraminal stenosis  C6-C7: Decreased disc height, circumferential bulge, minor bilateral facet and uncinate arthrosis  Moderate bilateral foraminal stenosis  C7-T1: Normal    UPPER THORACIC DISC SPACES: Normal    IMPRESSION:   Minor, noncompressive degenerative changes of the cervical spine most pronounced at C5-C6 and C6-C7  No orders of the defined types were placed in this encounter

## 2023-01-13 NOTE — PROGRESS NOTES
Assessment:  1  Cervical spondylosis    2  Neck pain        Plan:  1  We will schedule the patient for left C4-6 medial branch blocks with intention of moving forward towards radiofrequency ablation if there is an appropriate diagnostic response  The initial blocks will be performed with 2% lidocaine and if an appropriate response is obtained upon review of the patient's pain diary, a confirmatory block will be scheduled  Complete risks and benefits including bleeding, infection, tissue reaction, nerve injury and allergic reaction were discussed  The patient was agreeable and verbalized an understanding  2  I will trial the patient on diclofenac 50 mg twice a day as needed for pain  He was advised to avoid other NSAIDs while this medication and to take the medication with food  3  Patient will continue with his home exercise program  4  We can repeat C6-7 cervical epidural steroid injection should his left upper extremity symptoms return in the future  5  We can repeat right C4-6 RFA as needed  6  Follow-up pending results of blocks or sooner if needed    History of Present Illness: The patient is a 49 y o  male last seen on 11/21/2022 who presents for a follow up office visit in regards to chronic neck pain, left greater than right which is nonradiating into the upper extremities  Patient's left upper extremity symptoms resolved with previous C6-7 cervical epidural steroid injection  Patient also had significant provement of axial right-sided neck pain with right C4-6 RFA completed in 2021  patient would like to attempt cervical medial branch blocks on the left side of his neck  He has not found much relief with over-the-counter Tylenol or Advil  He uses tizanidine 2 mg on a sparing basis as it does cause some sedation and he is a [employer]   He rates his pain a 7 out of 10 on the numeric pain rating scale    He constantly has pain which is described as sharp, cramping and shooting    I have personally reviewed and/or updated the patient's past medical history, past surgical history, family history, social history, current medications, allergies, and vital signs today  Review of Systems:    Review of Systems   Respiratory: Negative for shortness of breath  Cardiovascular: Negative for chest pain  Gastrointestinal: Negative for constipation, diarrhea, nausea and vomiting  Musculoskeletal: Negative for arthralgias, gait problem, joint swelling and myalgias  Skin: Negative for rash  Neurological: Negative for dizziness, seizures and weakness  All other systems reviewed and are negative  Past Medical History:   Diagnosis Date   • Allergic    • Ho's esophagus    • Cervical disc disorder    • Eczema    • GERD (gastroesophageal reflux disease)    • Hyperlipidemia    • Migraine    • Streptococcal pharyngitis        Past Surgical History:   Procedure Laterality Date   • APPENDECTOMY     • US GUIDED MSK PROCEDURE  7/31/2020       Family History   Problem Relation Age of Onset   • No Known Problems Mother        Social History     Occupational History   • Not on file   Tobacco Use   • Smoking status: Every Day     Packs/day: 0 25     Types: Cigarettes   • Smokeless tobacco: Never   Vaping Use   • Vaping Use: Never used   Substance and Sexual Activity   • Alcohol use: Yes     Alcohol/week: 6 0 standard drinks     Types: 6 Cans of beer per week     Comment: occasional   • Drug use: Yes     Types:  Other, Marijuana     Comment: medical marijuana   • Sexual activity: Not on file         Current Outpatient Medications:   •  diclofenac sodium (VOLTAREN) 50 mg EC tablet, Take 1 tablet (50 mg total) by mouth 2 (two) times a day as needed (pain), Disp: 60 tablet, Rfl: 1  •  latanoprost (XALATAN) 0 005 % ophthalmic solution, INSTILL 1 DROP INTO BOTH EYES AT BEDTIME, STOCK SIZE 2 5ML, Disp: , Rfl: 3  •  pantoprazole (PROTONIX) 40 mg tablet, TAKE 1 TABLET BY MOUTH EVERY DAY, Disp: 30 tablet, Rfl: 6  •  rosuvastatin (CRESTOR) 10 MG tablet, TAKE 1 TABLET BY MOUTH EVERY DAY, Disp: 90 tablet, Rfl: 1  •  tiZANidine (ZANAFLEX) 2 mg tablet, Take 1 tablet (2 mg total) by mouth every 8 (eight) hours as needed for muscle spasms, Disp: 90 tablet, Rfl: 1    No Known Allergies    Physical Exam:    /87   Pulse 57   Ht 5' 9" (1 753 m)   Wt 80 3 kg (177 lb)   BMI 26 14 kg/m²     Constitutional:normal, well developed, well nourished, alert, in no distress and non-toxic and no overt pain behavior  Eyes:anicteric  HEENT:grossly intact  Neck:supple, symmetric, trachea midline and no masses   Pulmonary:even and unlabored  Cardiovascular:No edema or pitting edema present  Skin:Normal without rashes or lesions and well hydrated  Psychiatric:Mood and affect appropriate  Neurologic:Cranial Nerves II-XII grossly intact  Musculoskeletal:normal gait    Slightly decreased range of motion with side rotation bilaterally      Imaging  [facility]    (Results Pending)         Orders Placed This Encounter   Procedures   • [place] [facility]

## 2023-01-16 ENCOUNTER — OFFICE VISIT (OUTPATIENT)
Dept: PAIN MEDICINE | Facility: CLINIC | Age: 48
End: 2023-01-16

## 2023-01-16 VITALS
DIASTOLIC BLOOD PRESSURE: 87 MMHG | HEART RATE: 57 BPM | BODY MASS INDEX: 26.22 KG/M2 | HEIGHT: 69 IN | SYSTOLIC BLOOD PRESSURE: 130 MMHG | WEIGHT: 177 LBS

## 2023-01-16 DIAGNOSIS — M47.812 CERVICAL SPONDYLOSIS: Primary | ICD-10-CM

## 2023-01-16 DIAGNOSIS — M54.2 NECK PAIN: ICD-10-CM

## 2023-02-04 DIAGNOSIS — K21.9 GASTROESOPHAGEAL REFLUX DISEASE WITHOUT ESOPHAGITIS: ICD-10-CM

## 2023-02-04 RX ORDER — PANTOPRAZOLE SODIUM 40 MG/1
TABLET, DELAYED RELEASE ORAL
Qty: 30 TABLET | Refills: 6 | Status: SHIPPED | OUTPATIENT
Start: 2023-02-04

## 2023-03-02 ENCOUNTER — VBI (OUTPATIENT)
Dept: ADMINISTRATIVE | Facility: OTHER | Age: 48
End: 2023-03-02

## 2023-03-09 LAB
ALBUMIN SERPL-MCNC: 4.5 G/DL (ref 3.6–5.1)
ALBUMIN/GLOB SERPL: 1.8 (CALC) (ref 1–2.5)
ALP SERPL-CCNC: 73 U/L (ref 36–130)
ALT SERPL-CCNC: 20 U/L (ref 9–46)
AST SERPL-CCNC: 18 U/L (ref 10–40)
BASOPHILS # BLD AUTO: 32 CELLS/UL (ref 0–200)
BASOPHILS NFR BLD AUTO: 0.4 %
BILIRUB SERPL-MCNC: 0.7 MG/DL (ref 0.2–1.2)
BUN SERPL-MCNC: 17 MG/DL (ref 7–25)
BUN/CREAT SERPL: NORMAL (CALC) (ref 6–22)
CALCIUM SERPL-MCNC: 9.3 MG/DL (ref 8.6–10.3)
CHLORIDE SERPL-SCNC: 103 MMOL/L (ref 98–110)
CHOLEST SERPL-MCNC: 185 MG/DL
CHOLEST/HDLC SERPL: 3.5 (CALC)
CO2 SERPL-SCNC: 30 MMOL/L (ref 20–32)
CREAT SERPL-MCNC: 0.93 MG/DL (ref 0.6–1.29)
EOSINOPHIL # BLD AUTO: 146 CELLS/UL (ref 15–500)
EOSINOPHIL NFR BLD AUTO: 1.8 %
ERYTHROCYTE [DISTWIDTH] IN BLOOD BY AUTOMATED COUNT: 12.7 % (ref 11–15)
GFR/BSA.PRED SERPLBLD CYS-BASED-ARV: 101 ML/MIN/1.73M2
GLOBULIN SER CALC-MCNC: 2.5 G/DL (CALC) (ref 1.9–3.7)
GLUCOSE SERPL-MCNC: 89 MG/DL (ref 65–99)
HCT VFR BLD AUTO: 43.3 % (ref 38.5–50)
HDLC SERPL-MCNC: 53 MG/DL
HGB BLD-MCNC: 14.7 G/DL (ref 13.2–17.1)
LDLC SERPL CALC-MCNC: 86 MG/DL (CALC)
LYMPHOCYTES # BLD AUTO: 2333 CELLS/UL (ref 850–3900)
LYMPHOCYTES NFR BLD AUTO: 28.8 %
MCH RBC QN AUTO: 31.1 PG (ref 27–33)
MCHC RBC AUTO-ENTMCNC: 33.9 G/DL (ref 32–36)
MCV RBC AUTO: 91.5 FL (ref 80–100)
MONOCYTES # BLD AUTO: 648 CELLS/UL (ref 200–950)
MONOCYTES NFR BLD AUTO: 8 %
NEUTROPHILS # BLD AUTO: 4941 CELLS/UL (ref 1500–7800)
NEUTROPHILS NFR BLD AUTO: 61 %
NONHDLC SERPL-MCNC: 132 MG/DL (CALC)
PLATELET # BLD AUTO: 250 THOUSAND/UL (ref 140–400)
PMV BLD REES-ECKER: 11.9 FL (ref 7.5–12.5)
POTASSIUM SERPL-SCNC: 4.5 MMOL/L (ref 3.5–5.3)
PROT SERPL-MCNC: 7 G/DL (ref 6.1–8.1)
RBC # BLD AUTO: 4.73 MILLION/UL (ref 4.2–5.8)
SODIUM SERPL-SCNC: 140 MMOL/L (ref 135–146)
TRIGL SERPL-MCNC: 345 MG/DL
WBC # BLD AUTO: 8.1 THOUSAND/UL (ref 3.8–10.8)

## 2023-05-08 NOTE — PROGRESS NOTES
Assessment:  1  Cervical radiculopathy    2  Cervical spondylosis without myelopathy    3  Myofascial pain syndrome    4  Cervical stenosis of spine    5  Chronic neck and back pain    6  Chronic bilateral low back pain without sciatica    7  Chronic right-sided thoracic back pain        Plan:  1  Patient will be scheduled for a left C6-7 cervical epidural steroid injection to address the inflammatory component of the patient's pain complete risks and benefits including bleeding, infection, tissue reaction, nerve injury and allergic reaction were discussed  The patient was agreeable and verbalized an understanding  2   Patient may continue tizanidine as prescribed  He does not require refills today  3  We will avoid NSAIDs secondary to GI upset  4  We can repeat right C4-6 RFA as needed  5  Continue with home exercise program  6  Follow-up after procedure or sooner if needed    History of Present Illness: The patient is a 49 y o  male last seen on 01/16/2023 who presents for a follow up office visit in regards to chronic neck pain that radiates into the left axilla, left scapular region and occasionally into the left chest wall with associated numbness  He denies right upper extremity symptoms, bowel or bladder incontinence or balance issues  Patient has had C6-7 cervical epidural steroid injection in the past with resolution of his left upper extremity symptoms  Is also had good relief with right C4-6 RFA  He uses Tylenol and tizanidine on a sparing basis  He prefers to avoid any type of sedating meds secondary to his employment  He rates his pain a 7 out of 10 on the numeric pain rating scale  He constantly has pain in the evening which is described as burning, cramping, shooting and numbness    I have personally reviewed and/or updated the patient's past medical history, past surgical history, family history, social history, current medications, allergies, and vital signs today         Review of "Systems:    Review of Systems   Respiratory: Negative for shortness of breath  Cardiovascular: Negative for chest pain  Gastrointestinal: Negative for constipation, diarrhea, nausea and vomiting  Musculoskeletal: Negative for arthralgias, gait problem, joint swelling and myalgias  Skin: Negative for rash  Neurological: Negative for dizziness, seizures and weakness  All other systems reviewed and are negative  Past Medical History:   Diagnosis Date   • Allergic    • Ho's esophagus    • Cervical disc disorder    • Eczema    • GERD (gastroesophageal reflux disease)    • Hyperlipidemia    • Migraine    • Streptococcal pharyngitis        Past Surgical History:   Procedure Laterality Date   • APPENDECTOMY     • US GUIDED MSK PROCEDURE  7/31/2020       Family History   Problem Relation Age of Onset   • No Known Problems Mother        Social History     Occupational History   • Not on file   Tobacco Use   • Smoking status: Every Day     Packs/day: 0 25     Types: Cigarettes   • Smokeless tobacco: Never   Vaping Use   • Vaping Use: Never used   Substance and Sexual Activity   • Alcohol use: Yes     Alcohol/week: 6 0 standard drinks     Types: 6 Cans of beer per week     Comment: occasional   • Drug use: Yes     Types:  Other, Marijuana     Comment: medical marijuana   • Sexual activity: Not on file         Current Outpatient Medications:   •  pantoprazole (PROTONIX) 40 mg tablet, TAKE 1 TABLET BY MOUTH EVERY DAY, Disp: 30 tablet, Rfl: 6  •  rosuvastatin (CRESTOR) 10 MG tablet, TAKE 1 TABLET BY MOUTH EVERY DAY, Disp: 90 tablet, Rfl: 1  •  tiZANidine (ZANAFLEX) 2 mg tablet, Take 1 tablet (2 mg total) by mouth every 8 (eight) hours as needed for muscle spasms, Disp: 90 tablet, Rfl: 1  •  latanoprost (XALATAN) 0 005 % ophthalmic solution, INSTILL 1 DROP INTO BOTH EYES AT BEDTIME, STOCK SIZE 2 5ML, Disp: , Rfl: 3    No Known Allergies    Physical Exam:    /89   Pulse 61   Ht 5' 9\" (1 753 m)   Wt " 79 8 kg (176 lb)   BMI 25 99 kg/m²     Constitutional:normal, well developed, well nourished, alert, in no distress and non-toxic and no overt pain behavior  Eyes:anicteric  HEENT:grossly intact  Neck:supple, symmetric, trachea midline and no masses   Pulmonary:even and unlabored  Cardiovascular:No edema or pitting edema present  Skin:Normal without rashes or lesions and well hydrated  Psychiatric:Mood and affect appropriate  Neurologic:Cranial Nerves II-XII grossly intact  Musculoskeletal:normal gait  Left cervical paraspinal and trapezii musculature tender to palpation  Range of motion in all planes of the cervical spine  Bilateral upper extremity strength 5 out of 5 in all muscle groups  Negative Waters's reflex    Positive Spurling's to the left and negative to the right      Imaging  [facility] procedure    (Results Pending)         Orders Placed This Encounter   Procedures   • [place] [facility] procedure

## 2023-05-10 ENCOUNTER — OFFICE VISIT (OUTPATIENT)
Dept: PAIN MEDICINE | Facility: CLINIC | Age: 48
End: 2023-05-10

## 2023-05-10 VITALS
HEIGHT: 69 IN | WEIGHT: 176 LBS | BODY MASS INDEX: 26.07 KG/M2 | HEART RATE: 61 BPM | SYSTOLIC BLOOD PRESSURE: 135 MMHG | DIASTOLIC BLOOD PRESSURE: 89 MMHG

## 2023-05-10 DIAGNOSIS — M79.18 MYOFASCIAL PAIN SYNDROME: ICD-10-CM

## 2023-05-10 DIAGNOSIS — M47.812 CERVICAL SPONDYLOSIS WITHOUT MYELOPATHY: ICD-10-CM

## 2023-05-10 DIAGNOSIS — M54.9 CHRONIC NECK AND BACK PAIN: ICD-10-CM

## 2023-05-10 DIAGNOSIS — G89.29 CHRONIC BILATERAL LOW BACK PAIN WITHOUT SCIATICA: ICD-10-CM

## 2023-05-10 DIAGNOSIS — M54.50 CHRONIC BILATERAL LOW BACK PAIN WITHOUT SCIATICA: ICD-10-CM

## 2023-05-10 DIAGNOSIS — G89.29 CHRONIC RIGHT-SIDED THORACIC BACK PAIN: ICD-10-CM

## 2023-05-10 DIAGNOSIS — M54.2 CHRONIC NECK AND BACK PAIN: ICD-10-CM

## 2023-05-10 DIAGNOSIS — M48.02 CERVICAL STENOSIS OF SPINE: ICD-10-CM

## 2023-05-10 DIAGNOSIS — G89.29 CHRONIC NECK AND BACK PAIN: ICD-10-CM

## 2023-05-10 DIAGNOSIS — M54.12 CERVICAL RADICULOPATHY: Primary | ICD-10-CM

## 2023-05-10 DIAGNOSIS — M54.6 CHRONIC RIGHT-SIDED THORACIC BACK PAIN: ICD-10-CM

## 2023-08-08 ENCOUNTER — OFFICE VISIT (OUTPATIENT)
Dept: FAMILY MEDICINE CLINIC | Facility: CLINIC | Age: 48
End: 2023-08-08
Payer: COMMERCIAL

## 2023-08-08 VITALS
DIASTOLIC BLOOD PRESSURE: 84 MMHG | WEIGHT: 177.38 LBS | BODY MASS INDEX: 26.27 KG/M2 | HEIGHT: 69 IN | HEART RATE: 56 BPM | OXYGEN SATURATION: 99 % | SYSTOLIC BLOOD PRESSURE: 134 MMHG | TEMPERATURE: 98 F | RESPIRATION RATE: 18 BRPM

## 2023-08-08 DIAGNOSIS — B34.9 VIRAL ILLNESS: Primary | ICD-10-CM

## 2023-08-08 LAB
SARS-COV-2 AG UPPER RESP QL IA: NEGATIVE
VALID CONTROL: NORMAL

## 2023-08-08 PROCEDURE — 87811 SARS-COV-2 COVID19 W/OPTIC: CPT | Performed by: NURSE PRACTITIONER

## 2023-08-08 PROCEDURE — 99213 OFFICE O/P EST LOW 20 MIN: CPT | Performed by: NURSE PRACTITIONER

## 2023-08-08 NOTE — PROGRESS NOTES
[facility]    NAME: Veneda Bloch  AGE: 49 y.o. SEX: male  : 1975     DATE: 2023     Assessment and Plan:     Problem List Items Addressed This Visit        Other    Viral illness - Primary    Relevant Orders    POCT Rapid Covid Ag (Completed)           No follow-ups on file. Chief Complaint:     Chief Complaint   Patient presents with   • Cough   • Sore Throat   • Swollen Glands     X 2 days         History of Present Illness:     Pete Medina presents to the office this morning with a 2-day history of sore throat and swollen glands in his neck bilaterally. He is also complaining of some fatigue and nasal congestion. He does have a history of allergies. He denies any body aches or fever. Rapid COVID in the office today was negative. His exam was within normal limits with no enlarged lymph nodes palpated. Patient instructed to try over-the-counter medications for his allergy symptoms such as Zyrtec, Claritin or Flonase. Information regarding treatment of a sore throat also given to the patient. He will contact the office should his symptoms worsen. Review of Systems:     Review of Systems   Constitutional: Positive for fatigue. HENT: Positive for sore throat. Negative for congestion, postnasal drip, rhinorrhea and trouble swallowing. Eyes: Negative. Negative for visual disturbance. Respiratory: Negative. Negative for choking and shortness of breath. Cardiovascular: Negative. Negative for chest pain. Gastrointestinal: Negative. Endocrine: Negative. Genitourinary: Negative. Musculoskeletal: Negative. Negative for arthralgias, back pain, myalgias and neck pain. Skin: Negative. Neurological: Negative for dizziness and headaches. Hematological: Positive for adenopathy. Psychiatric/Behavioral: Negative.          Problem List:     Patient Active Problem List   Diagnosis   • Cervical spondylosis without myelopathy   • Cervical stenosis of spine   • Chronic neck and back pain   • Myofascial pain syndrome   • Cervical radiculopathy   • Chronic bilateral low back pain without sciatica   • Chronic right-sided thoracic back pain   • Cigarette nicotine dependence without complication   • Environmental and seasonal allergies   • GERD without esophagitis   • Hyperlipidemia   • Neural foraminal stenosis of cervical spine   • Seasonal allergies   • Ho's esophagus without dysplasia   • Well adult exam   • Neck pain   • Blood pressure increase diastolic   • Viral illness        Objective:     /84   Pulse 56   Temp 98 °F (36.7 °C)   Resp 18   Ht 5' 9" (1.753 m)   Wt 80.5 kg (177 lb 6 oz)   SpO2 99%   BMI 26.19 kg/m²     Current Outpatient Medications   Medication Sig Dispense Refill   • latanoprost (XALATAN) 0.005 % ophthalmic solution INSTILL 1 DROP INTO BOTH EYES AT BEDTIME, STOCK SIZE 2.5ML  3   • pantoprazole (PROTONIX) 40 mg tablet TAKE 1 TABLET BY MOUTH EVERY DAY 30 tablet 6   • rosuvastatin (CRESTOR) 10 MG tablet TAKE 1 TABLET BY MOUTH EVERY DAY 90 tablet 1     No current facility-administered medications for this visit. Physical Exam  Vitals reviewed. Constitutional:       Appearance: Normal appearance. He is obese. HENT:      Head: Normocephalic and atraumatic. Right Ear: Tympanic membrane and ear canal normal.      Left Ear: Tympanic membrane and ear canal normal.      Nose: Congestion present. No rhinorrhea. Mouth/Throat:      Mouth: Mucous membranes are moist.      Tonsils: No tonsillar exudate or tonsillar abscesses. 0 on the right. 0 on the left. Eyes:      Extraocular Movements: Extraocular movements intact. Conjunctiva/sclera: Conjunctivae normal.      Pupils: Pupils are equal, round, and reactive to light. Neck:      Thyroid: No thyromegaly. Cardiovascular:      Rate and Rhythm: Normal rate and regular rhythm. Pulses: Normal pulses. Heart sounds: Normal heart sounds. Pulmonary:      Effort: Pulmonary effort is normal.      Breath sounds: Normal breath sounds. Musculoskeletal:         General: Normal range of motion. Cervical back: Normal range of motion. Lymphadenopathy:      Cervical: No cervical adenopathy. Skin:     General: Skin is warm. Neurological:      General: No focal deficit present. Mental Status: He is alert and oriented to person, place, and time. Psychiatric:         Mood and Affect: Mood normal.         Behavior: Behavior normal.         Thought Content:  Thought content normal.         Judgment: Judgment normal.         Allyson Pham, [id number]

## 2023-08-08 NOTE — PATIENT INSTRUCTIONS
Flonase nasal spray daily. Zyrtec or claritan daily. Over the counter decongestants such as Tylenol sinus or Motrin Sinus can be used. Pharyngitis   WHAT YOU NEED TO KNOW:   Pharyngitis, or sore throat, is inflammation of the tissues and structures in your pharynx (throat). Pharyngitis is most often caused by bacteria or a virus. Other causes include smoking, allergies, or acid reflux. DISCHARGE INSTRUCTIONS:   Call your local emergency number (911 in the [address]) if:   You have trouble breathing or swallowing because your throat is swollen. Return to the emergency department if:   You are drooling because it hurts too much to swallow. Your fever is higher than 102? F (39?C) or lasts longer than 3 days. You are confused. You taste blood. Call your doctor if:   Your throat pain gets worse. You have a painful lump in your throat that does not go away after 5 days. Your symptoms do not improve after 5 days. You have questions or concerns about your condition or care. Medicines:  Viral pharyngitis will go away on its own without treatment. Your sore throat should start to feel better in 3 to 5 days. You may need any of the following:  Antibiotics  treat a bacterial infection. NSAIDs  help decrease swelling and pain or fever. This medicine is available with or without a doctor's order. NSAIDs can cause stomach bleeding or kidney problems in certain people. If you take blood thinner medicine, always ask your healthcare provider if NSAIDs are safe for you. Always read the medicine label and follow directions. Acetaminophen  decreases pain and fever. It is available without a doctor's order. Ask how much to take and how often to take it. Follow directions. Read the labels of all other medicines you are using to see if they also contain acetaminophen, or ask your doctor or pharmacist. Acetaminophen can cause liver damage if not taken correctly. Take your medicine as directed.   Contact your healthcare provider if you think your medicine is not helping or if you have side effects. Tell your provider if you are allergic to any medicine. Keep a list of the medicines, vitamins, and herbs you take. Include the amounts, and when and why you take them. Bring the list or the pill bottles to follow-up visits. Carry your medicine list with you in case of an emergency. Manage your symptoms:   Gargle salt water. Mix ¼ teaspoon salt in an 8 ounce glass of warm water and gargle. Do not swallow. Salt water may help decrease swelling in your throat. Drink liquids as directed. You may need to drink more liquids than usual. Liquids may help soothe your throat and prevent dehydration. Ask how much liquid to drink each day and which liquids are best for you. Use a cool mist humidifier. This will add moisture to the air and help decrease your cough. Soothe your throat. Cough drops, ice, soft foods, or popsicles may help decrease throat pain. Prevent the spread of pharyngitis:  Cover your mouth and nose when you cough or sneeze. Do not share food or drinks. Wash your hands often. Use soap and water. If soap and water are unavailable, use an alcohol-based hand . Follow up with your doctor as directed:  Write down your questions so you remember to ask them during your visits. © Copyright [employer] 2022 Information is for End User's use only and may not be sold, redistributed or otherwise used for commercial purposes. The above information is an  only. It is not intended as medical advice for individual conditions or treatments. Talk to your doctor, nurse or pharmacist before following any medical regimen to see if it is safe and effective for you. Allergic Rhinitis   WHAT YOU NEED TO KNOW:   Allergic rhinitis, or hay fever, is swelling of the inside of your nose. The swelling is a reaction to allergens in the air.  An allergen can be anything that causes an allergic reaction. Allergies to weeds, grass, trees, or mold often cause seasonal allergic rhinitis. Indoor dust mites, cockroaches, pet dander, or mold can also cause allergic rhinitis. DISCHARGE INSTRUCTIONS:   Call 911 for the following: You have chest pain or shortness of breath. Return to the emergency department if:   You have severe pain. You cough up blood. Contact your healthcare provider if:   You have a fever. You have ear or sinus pain, or a headache. Your symptoms get worse, even after treatment. You have yellow, green, brown, or bloody mucus coming from your nose. Your nose is bleeding or you have pain inside your nose. You have trouble sleeping because of your symptoms. You have questions or concerns about your condition or care. Medicines:   Medicines  help decrease your symptoms and clear your stuffy nose. Take your medicine as directed. Contact your healthcare provider if you think your medicine is not helping or if you have side effects. Tell him of her if you are allergic to any medicine. Keep a list of the medicines, vitamins, and herbs you take. Include the amounts, and when and why you take them. Bring the list or the pill bottles to follow-up visits. Carry your medicine list with you in case of an emergency. How to manage allergic rhinitis:  The best way to manage allergic rhinitis is to avoid allergens that can trigger your symptoms. Any of the following may help decrease your symptoms:  Rinse your nose and sinuses  with a salt water solution or use a salt water nasal spray. This will help thin the mucus in your nose and rinse away pollen and dirt. It will also help reduce swelling so you can breathe normally. Ask your healthcare provider how often to rinse your nose. Reduce exposure to dust mites. Wash sheets and towels in hot water every week. Cover your pillows and mattresses with allergen-free covers.  Limit the number of stuffed animals and soft toys your child has. Wash your child's toys in hot water regularly. Vacuum weekly and use a vacuum  with an air filter. If possible, get rid of carpets and curtains. These collect dust and dust mites. Reduce exposure to pollen. Keep windows and doors closed in your house and car. Stay inside when air pollution or the pollen count is high. Run your air conditioner on recycle, and change air filters often. Shower and wash your hair before bed every night to rinse away pollen. Reduce exposure to pet dander. If possible, do not keep cats, dogs, birds, or other pets. If you do keep pets in your home, keep them out of bedrooms and carpeted rooms. Bathe them often. Reduce exposure to mold. Do not spend time in basements. Choose artificial plants instead of live plants. Keep your home's humidity at less than 45%. Do not have ponds or standing water in your home or yard. Do not smoke. Avoid others who smoke. Ask your healthcare provider for information if you currently smoke and need help to quit. Follow up with your healthcare provider as directed: You may need to see an allergist often to control your symptoms. Write down your questions so you remember to ask them during your visits. © Copyright [employer] 2022 Information is for End User's use only and may not be sold, redistributed or otherwise used for commercial purposes. All illustrations and images included in CareNotes® are the copyrighted property of [employer], [employer]. or [address] [place] [address]  The above information is an  only. It is not intended as medical advice for individual conditions or treatments. Talk to your doctor, nurse or pharmacist before following any medical regimen to see if it is safe and effective for you. done

## 2023-09-11 DIAGNOSIS — K21.9 GASTROESOPHAGEAL REFLUX DISEASE WITHOUT ESOPHAGITIS: ICD-10-CM

## 2023-09-11 RX ORDER — PANTOPRAZOLE SODIUM 40 MG/1
TABLET, DELAYED RELEASE ORAL
Qty: 30 TABLET | Refills: 6 | Status: SHIPPED | OUTPATIENT
Start: 2023-09-11

## 2023-10-10 ENCOUNTER — OFFICE VISIT (OUTPATIENT)
Dept: FAMILY MEDICINE CLINIC | Facility: CLINIC | Age: 48
End: 2023-10-10
Payer: COMMERCIAL

## 2023-10-10 VITALS
SYSTOLIC BLOOD PRESSURE: 132 MMHG | WEIGHT: 179 LBS | DIASTOLIC BLOOD PRESSURE: 88 MMHG | OXYGEN SATURATION: 96 % | RESPIRATION RATE: 18 BRPM | BODY MASS INDEX: 26.51 KG/M2 | HEART RATE: 62 BPM | TEMPERATURE: 97.3 F | HEIGHT: 69 IN

## 2023-10-10 DIAGNOSIS — F17.210 CIGARETTE NICOTINE DEPENDENCE WITHOUT COMPLICATION: ICD-10-CM

## 2023-10-10 DIAGNOSIS — K22.70 BARRETT'S ESOPHAGUS WITHOUT DYSPLASIA: ICD-10-CM

## 2023-10-10 DIAGNOSIS — Z12.11 COLON CANCER SCREENING: ICD-10-CM

## 2023-10-10 DIAGNOSIS — Z23 ENCOUNTER FOR ADMINISTRATION OF VACCINE: ICD-10-CM

## 2023-10-10 DIAGNOSIS — Z00.00 ANNUAL PHYSICAL EXAM: Primary | ICD-10-CM

## 2023-10-10 DIAGNOSIS — E78.1 PURE HYPERTRIGLYCERIDEMIA: ICD-10-CM

## 2023-10-10 PROCEDURE — 90686 IIV4 VACC NO PRSV 0.5 ML IM: CPT

## 2023-10-10 PROCEDURE — 99396 PREV VISIT EST AGE 40-64: CPT | Performed by: NURSE PRACTITIONER

## 2023-10-10 PROCEDURE — 90471 IMMUNIZATION ADMIN: CPT

## 2023-10-10 NOTE — ASSESSMENT & PLAN NOTE
Continue Crestor. Still with chronic elevation of triglycerides. Lipid panel ordered for surveillance.

## 2023-10-10 NOTE — PATIENT INSTRUCTIONS
Wellness Visit for Adults   AMBULATORY CARE:   A wellness visit  is when you see your healthcare provider to get screened for health problems. Your healthcare provider will also give you advice on how to stay healthy. Write down your questions so you remember to ask them. Ask your healthcare provider how often you should have a wellness visit. What happens at a wellness visit:  Your healthcare provider will ask about your health, and your family history of health problems. This includes high blood pressure, heart disease, and cancer. He or she will ask if you have symptoms that concern you, if you smoke, and about your mood. You may also be asked about your intake of medicines, supplements, food, and alcohol. Any of the following may be done: Your weight  will be checked. Your height may also be checked so your body mass index (BMI) can be calculated. Your BMI shows if you are at a healthy weight. Your blood pressure  and heart rate will be checked. Your temperature may also be checked. Blood and urine tests  may be done. Blood tests may be done to check your cholesterol levels. Abnormal cholesterol levels increase your risk for heart disease and stroke. You may also need a blood or urine test to check for diabetes if you are at increased risk. Urine tests may be done to look for signs of an infection or kidney disease. A physical exam  includes checking your heartbeat and lungs with a stethoscope. Your healthcare provider may also check your skin to look for sun damage. Screening tests  may be recommended. A screening test is done to check for diseases that may not cause symptoms. The screening tests you may need depend on your age, gender, family history, and lifestyle habits. For example, colorectal screening may be recommended if you are 48years old or older. Screening tests you need if you are a woman:   A Pap smear  is used to screen for cervical cancer.  Pap smears are usually done every 3 to 5 years depending on your age. You may need them more often if you have had abnormal Pap smear test results in the past. Ask your healthcare provider how often you should have a Pap smear. A mammogram  is an x-ray of your breasts to screen for breast cancer. Experts recommend mammograms every 2 years starting at age 48 years. You may need a mammogram at age 52 years or younger if you have an increased risk for breast cancer. Talk to your healthcare provider about when you should start having mammograms and how often you need them. Vaccines you may need:   Get an influenza vaccine  every year. The influenza vaccine protects you from the flu. Several types of viruses cause the flu. The viruses change over time, so new vaccines are made each year. Get a tetanus-diphtheria (Td) booster vaccine  every 10 years. This vaccine protects you against tetanus and diphtheria. Tetanus is a severe infection that may cause painful muscle spasms and lockjaw. Diphtheria is a severe bacterial infection that causes a thick covering in the back of your mouth and throat. Get a human papillomavirus (HPV) vaccine  if you are female and aged 23 to 32 or male 23 to 24 and never received it. This vaccine protects you from HPV infection. HPV is the most common infection spread by sexual contact. HPV may also cause vaginal, penile, and anal cancers. Get a pneumococcal vaccine  if you are aged 72 years or older. The pneumococcal vaccine is an injection given to protect you from pneumococcal disease. Pneumococcal disease is an infection caused by pneumococcal bacteria. The infection may cause pneumonia, meningitis, or an ear infection. Get a shingles vaccine  if you are 60 or older, even if you have had shingles before. The shingles vaccine is an injection to protect you from the varicella-zoster virus. This is the same virus that causes chickenpox.  Shingles is a painful rash that develops in people who had chickenpox or have been exposed to the virus. How to eat healthy:  My Plate is a model for planning healthy meals. It shows the types and amounts of foods that should go on your plate. Fruits and vegetables make up about half of your plate, and grains and protein make up the other half. A serving of dairy is included on the side of your plate. The amount of calories and serving sizes you need depends on your age, gender, weight, and height. Examples of healthy foods are listed below:  Eat a variety of vegetables  such as dark green, red, and orange vegetables. You can also include canned vegetables low in sodium (salt) and frozen vegetables without added butter or sauces. Eat a variety of fresh fruits , canned fruit in 100% juice, frozen fruit, and dried fruit. Include whole grains. At least half of the grains you eat should be whole grains. Examples include whole-wheat bread, wheat pasta, brown rice, and whole-grain cereals such as oatmeal.    Eat a variety of protein foods such as seafood (fish and shellfish), lean meat, and poultry without skin (turkey and chicken). Examples of lean meats include pork leg, shoulder, or tenderloin, and beef round, sirloin, tenderloin, and extra lean ground beef. Other protein foods include eggs and egg substitutes, beans, peas, soy products, nuts, and seeds. Choose low-fat dairy products such as skim or 1% milk or low-fat yogurt, cheese, and cottage cheese. Limit unhealthy fats  such as butter, hard margarine, and shortening. Exercise:  Exercise at least 30 minutes per day on most days of the week. Some examples of exercise include walking, biking, dancing, and swimming. You can also fit in more physical activity by taking the stairs instead of the elevator or parking farther away from stores. Include muscle strengthening activities 2 days each week. Regular exercise provides many health benefits.  It helps you manage your weight, and decreases your risk for type 2 diabetes, heart disease, stroke, and high blood pressure. Exercise can also help improve your mood. Ask your healthcare provider about the best exercise plan for you. General health and safety guidelines:   Do not smoke. Nicotine and other chemicals in cigarettes and cigars can cause lung damage. Ask your healthcare provider for information if you currently smoke and need help to quit. E-cigarettes or smokeless tobacco still contain nicotine. Talk to your healthcare provider before you use these products. Limit alcohol. A drink of alcohol is 12 ounces of beer, 5 ounces of wine, or 1½ ounces of liquor. Lose weight, if needed. Being overweight increases your risk of certain health conditions. These include heart disease, high blood pressure, type 2 diabetes, and certain types of cancer. Protect your skin. Do not sunbathe or use tanning beds. Use sunscreen with a SPF 15 or higher. Apply sunscreen at least 15 minutes before you go outside. Reapply sunscreen every 2 hours. Wear protective clothing, hats, and sunglasses when you are outside. Drive safely. Always wear your seatbelt. Make sure everyone in your car wears a seatbelt. A seatbelt can save your life if you are in an accident. Do not use your cell phone when you are driving. This could distract you and cause an accident. Pull over if you need to make a call or send a text message. Practice safe sex. Use latex condoms if are sexually active and have more than one partner. Your healthcare provider may recommend screening tests for sexually transmitted infections (STIs). Wear helmets, lifejackets, and protective gear. Always wear a helmet when you ride a bike or motorcycle, go skiing, or play sports that could cause a head injury. Wear protective equipment when you play sports. Wear a lifejacket when you are on a boat or doing water sports.     © Copyright Vanna Ranks 2023 Information is for End User's use only and may not be sold, redistributed or otherwise used for commercial purposes. The above information is an  only. It is not intended as medical advice for individual conditions or treatments. Talk to your doctor, nurse or pharmacist before following any medical regimen to see if it is safe and effective for you. Cholesterol and Your Health   AMBULATORY CARE:   Cholesterol  is a waxy, fat-like substance. Your body uses cholesterol to make hormones and new cells, and to protect nerves. Cholesterol is made by your body. It also comes from certain foods you eat, such as meat and dairy products. Your healthcare provider can help you set goals for your cholesterol levels. Your provider can help you create a plan to meet your goals. Cholesterol level goals: Your cholesterol level goals depend on your risk for heart disease, your age, and your other health conditions. The following are general guidelines: Total cholesterol  includes low-density lipoprotein (LDL), high-density lipoprotein (HDL), and triglyceride levels. The total cholesterol level should be lower than 200 mg/dL and is best at about 150 mg/dL. LDL cholesterol  is called bad cholesterol  because it forms plaque in your arteries. As plaque builds up, your arteries become narrow, and less blood flows through. When plaque decreases blood flow to your heart, you may have chest pain. If plaque completely blocks an artery that brings blood to your heart, you may have a heart attack. Plaque can break off and form blood clots. Blood clots may block arteries in your brain and cause a stroke. The level should be less than 130 mg/dL and is best at about 100 mg/dL. HDL cholesterol  is called good cholesterol  because it helps remove LDL cholesterol from your arteries. It does this by attaching to LDL cholesterol and carrying it to your liver. Your liver breaks down LDL cholesterol so your body can get rid of it.  High levels of HDL cholesterol can help prevent a heart attack and stroke. Low levels of HDL cholesterol can increase your risk for heart disease, heart attack, and stroke. The level should be at least 40 mg/dL in males or at least 50 mg/dL in females. Triglycerides  are a type of fat that store energy from foods you eat. High levels of triglycerides also cause plaque buildup. This can increase your risk for a heart attack or stroke. If your triglyceride level is high, your LDL cholesterol level may also be high. The level should be less than 150 mg/dL. Any of the following can increase your risk for high cholesterol:   Smoking or drinking large amounts of alcohol    Having overweight or obesity, or not getting enough exercise    A medical condition such as hypertension (high blood pressure) or diabetes    A family history of high cholesterol    Age older than 72    What you need to know about having your cholesterol levels checked: Adults 21to 39years of age should have their cholesterol levels checked every 4 to 6 years. Adults 45 years or older should have their cholesterol checked every 1 to 2 years. You may need your cholesterol checked more often, or at a younger age, if you have risk factors for heart disease. You may also need to have your cholesterol checked more often if you have other health conditions, such as diabetes. Blood tests are used to check cholesterol levels. Blood tests measure your levels of triglycerides, LDL cholesterol, and HDL cholesterol. How healthy fats affect your cholesterol levels:  Healthy fats, also called unsaturated fats, help lower LDL cholesterol and triglyceride levels. Healthy fats include the following:  Monounsaturated fats  are found in foods such as olive oil, canola oil, avocado, nuts, and olives. Polyunsaturated fats,  such as omega 3 fats, are found in fish, such as salmon, trout, and tuna. They can also be found in plant foods such as flaxseed, walnuts, and soybeans.     How unhealthy fats affect your cholesterol levels: Unhealthy fats increase LDL cholesterol and triglyceride levels. They are found in foods high in cholesterol, saturated fat, and trans fat:  Cholesterol  is found in eggs, dairy, and meat. Saturated fat  is found in butter, cheese, ice cream, whole milk, and coconut oil. Saturated fat is also found in meat, such as sausage, hot dogs, and bologna. Trans fat  is found in liquid oils and is used in fried and baked foods. Foods that contain trans fats include chips, crackers, muffins, sweet rolls, microwave popcorn, and cookies. Treatment  for high cholesterol will also decrease your risk of heart disease, heart attack, and stroke. Treatment may include any of the following:  Lifestyle changes  may include food, exercise, weight loss, and quitting smoking. You may also need to decrease the amount of alcohol you drink. Your healthcare provider will want you to start with lifestyle changes. Other treatment may be added if lifestyle changes are not enough. Your healthcare provider may recommend you work with a team to manage hyperlipidemia. The team may include medical experts such as a dietitian, an exercise or physical therapist, and a behavior therapist. Your family members may be included in helping you create lifestyle changes. Medicines  may be given to lower your LDL cholesterol, triglyceride levels, or total cholesterol level. You may need medicines to lower your cholesterol if any of the following is true:    You have a history of stroke, TIA, unstable angina, or a heart attack. Your LDL cholesterol level is 190 mg/dL or higher. You are age 36 to 76 years, have diabetes or heart disease risk factors, and your LDL cholesterol is 70 mg/dL or higher. Supplements  include fish oil, red yeast rice, and garlic. Fish oil may help lower your triglyceride and LDL cholesterol levels. It may also increase your HDL cholesterol level.  Red yeast rice may help decrease your total cholesterol level and LDL cholesterol level. Garlic may help lower your total cholesterol level. Do not take any supplements without talking to your healthcare provider. Food changes you can make to lower your cholesterol levels:  A dietitian can help you create a healthy eating plan. Your dietitian can show you how to read food labels and choose foods low in saturated fat, trans fats, and cholesterol. Decrease the total amount of fat you eat. Choose lean meats, fat-free or 1% fat milk, and low-fat dairy products, such as yogurt and cheese. Try to limit or avoid red meats. Limit or do not eat fried foods or baked goods, such as cookies. Replace unhealthy fats with healthy fats. Cook foods in olive oil or canola oil. Choose soft margarines that are low in saturated fat and trans fat. Seeds, nuts, and avocados are other examples of healthy fats. Eat foods with omega-3 fats. Examples include salmon, tuna, mackerel, walnuts, and flaxseed. Eat fish 2 times per week. Pregnant women should not eat fish that have high levels of mercury, such as shark, swordfish, and doe mackerel. Increase the amount of high-fiber foods you eat. High-fiber foods can help lower your LDL cholesterol. Aim to get between 20 and 30 grams of fiber each day. Fruits and vegetables are high in fiber. Eat at least 5 servings each day. Other high-fiber foods are whole-grain or whole-wheat breads, pastas, or cereals, and brown rice. Eat 3 ounces of whole-grain foods each day. Increase fiber slowly. You may have abdominal discomfort, bloating, and gas if you add fiber to your diet too quickly. Eat healthy protein foods. Examples include low-fat dairy products, skinless chicken and turkey, fish, and nuts. Limit foods and drinks that are high in sugar. Your dietitian or healthcare provider can help you create daily limits for high-sugar foods and drinks. The limit may be lower if you have diabetes or another health condition.  Limits can also help you lose weight if needed. Lifestyle changes you can make to lower your cholesterol levels:   Maintain a healthy weight. Ask your healthcare provider what a healthy weight is for you. Ask your provider to help you create a weight loss plan if needed. Weight loss can decrease your total cholesterol and triglyceride levels. Weight loss may also help keep your blood pressure at a healthy level. Be physically active throughout the day. Physical activity, such as exercise, can help lower your total cholesterol level and maintain a healthy weight. Physical activity can also help increase your HDL cholesterol level. Work with your healthcare provider to create an program that is right for you. Get at least 30 to 40 minutes of moderate physical activity most days of the week. Examples of exercise include brisk walking, swimming, or biking. Also include strength training at least 2 times each week. Your healthcare providers can help you create a physical activity plan. Do not smoke. Nicotine and other chemicals in cigarettes and cigars can raise your cholesterol levels. Ask your healthcare provider for information if you currently smoke and need help to quit. E-cigarettes or smokeless tobacco still contain nicotine. Talk to your healthcare provider before you use these products. Limit or do not drink alcohol. Alcohol can increase your triglyceride levels. Ask your healthcare provider before you drink alcohol. Ask how much is okay for you to drink in 24 hours or 1 week. Follow up with your doctor as directed:  Write down your questions so you remember to ask them during your visits. © Copyright Suzan Wilson 2023 Information is for End User's use only and may not be sold, redistributed or otherwise used for commercial purposes. The above information is an  only. It is not intended as medical advice for individual conditions or treatments.  Talk to your doctor, nurse or pharmacist before following any medical regimen to see if it is safe and effective for you. DASH Eating Plan   WHAT YOU NEED TO KNOW:   The DASH (Dietary Approaches to Stop Hypertension) Eating Plan is designed to help prevent or lower high blood pressure. It can also help to lower LDL (bad) cholesterol and decrease your risk for heart disease. The plan is low in sodium, sugar, unhealthy fats, and total fat. It is high in potassium, calcium, magnesium, and fiber. These nutrients are added when you eat more fruits, vegetables, and whole grains. With the DASH eating plan, you need to eat a certain number of servings from each food group. This will help you get enough of certain nutrients and limit others. The amount of servings you should eat depends on how many calories you need. Your dietitian can help you create meal plans with the right number of servings for each food group. DISCHARGE INSTRUCTIONS:   What you need to know about sodium:  Your dietitian will tell you how much sodium is safe for you to have each day. People with high blood pressure should have no more than 1,500 to 2,300 mg of sodium in a day. A teaspoon (tsp) of salt has 2,300 mg of sodium. This may seem like a difficult goal, but small changes to the foods you eat can make a big difference. Your healthcare provider or dietitian can help you create a meal plan that follows your sodium limit. Read food labels. Food labels can help you choose foods that are low in sodium. The amount of sodium is listed in milligrams (mg). The % Daily Value (DV) column tells you how much of your daily needs are met by 1 serving of the food for each nutrient listed. Choose foods that have less than 5% of the DV of sodium. These foods are considered low in sodium. Foods that have 20% or more of the DV of sodium are considered high in sodium. Avoid foods that have more than 300 mg of sodium in each serving.  Choose foods that say low-sodium, reduced-sodium, or no salt added on the food label. Limit added salt. Do not salt food at the table if you add salt when you cook. Use herbs and spices, such as onions, garlic, and salt-free seasonings to add flavor. Try lemon or lime juice or vinegar to add a tart flavor. Use hot peppers or a small amount of hot pepper sauce to add a spicy flavor. Limit foods high in added salt, such as the following:    Seasonings made with salt, such as garlic salt, celery salt, onion salt, seasoned salt, meat tenderizers, and monosodium glutamate (MSG)    Miso soup and canned or dried soup mixes    Regular soy sauce, barbecue sauce, teriyaki sauce, steak sauce, Worcestershire sauce, and most flavored vinegars    Snack foods, such as salted chips, popcorn, pretzels, pork rinds, salted crackers, and salted nuts    Frozen foods, such as dinners, entrees, vegetables with sauces, and breaded meats    Ask about salt substitutes. Ask your healthcare provider if you may use salt substitutes. Some salt substitutes have ingredients that can be harmful if you have certain health conditions. Choose foods carefully at restaurants. Meals from restaurants, especially fast food restaurants, are often high in sodium. Some restaurants have nutrition information that tells you the amount of sodium in their foods. Ask to have your food prepared with less, or no salt. What you need to know about fats:  Healthy fats include unsaturated fats and omega-3 fatty acids. Unhealthy fats include saturated fats and trans fats.   Include healthy fats, such as the following:      Cooking oils, such as soybean, canola, olive, or sunflower    Fatty fish, such as salmon, tuna, mackerel, or sardines    Flaxseed oil or ground flaxseed    ½ cup of cooked beans, such as black beans, kidney beans, or etienne beans    1½ ounces of low-sodium nuts, such as almonds or walnuts    Low-sugar, low-sodium peanut butter    Seeds such as rosalba seeds or sunflower seeds       Limit or do not have unhealthy fats, such as the following:      Foods that contain fat from animals, such as fatty meats, whole milk, butter, and cream    Shortening, stick margarine, palm oil, and coconut oil    Full-fat or creamy salad dressing    Creamy soup    Crackers, chips, and baked goods made with margarine or shortening    Foods that are fried in unhealthy fats    Gravy and sauces, such as Neto or cheese sauces    What you need to know about carbohydrates (carbs): All carbs break down into sugar. Complex carbs contain more fiber than simple carbs. This means complex carbs go into the bloodstream more slowly and cause less of a blood sugar spike. Try to include more complex carbs and fewer simple carbs. Include complex carbs, such as the followin slice of whole-grain bread    1 ounce of dry cereal that does not contain added sugar    ½ cup of cooked oatmeal    2 ounces of cooked whole-grain pasta    ½ cup of cooked brown rice    Limit or do not have simple carbs, such as the following:      [employer], such as doughnuts, pastries, and cookies    Mixes for cornbread and biscuits    White rice and pasta mixes, such as boxed macaroni and cheese    Instant and cold cereals that contain sugar    Jelly, jam, and ice cream that contain sugar    Condiments such as ketchup    Drinks high in sugar, such as soft drinks, lemonade, and fruit juice    What you need to know about vegetables and fruits:  Vegetables and fruits can be fresh, frozen, or canned. If possible, try to choose low-sodium canned options.   Include a variety of vegetables and fruits, such as the followin medium apple, pear, or peach (about ½ cup chopped)    ½ small banana    ½ cup berries, such as blueberries, strawberries, or blackberries    1 cup of raw leafy greens, such as lettuce, spinach, kale, or carli greens    ½ cup of frozen or canned (no added salt) vegetables, such as green beans    ½ cup of fresh, frozen, or canned fruit (canned in light syrup or fruit juice)    ½ cup of vegetable or fruit juice    Limit or do not have vegetables and fruits made in the following ways:      Frozen fruit such as cherries that have added sugar    Fruit in cream or butter sauce    Canned vegetables that are high in sodium    Sauerkraut, pickled vegetables, and other foods prepared in brine    Fried vegetables or vegetables in butter or high-fat sauces    What you need to know about protein foods: Include lean or low-fat protein foods, such as the following:      Poultry (chicken, turkey) with no skin    Fish (especially fatty fish, such as salmon, fresh tuna, or mackerel)    Lean beef and pork (loin, round, extra lean hamburger)    Egg whites and egg substitutes    1 cup of nonfat (skim) or 1% milk    1½ ounces of fat-free or low-fat cheese    6 ounces of nonfat or low-fat yogurt    Limit or do not have high-fat protein foods, such as the following:      Smoked or cured meat, such as corned beef, carnes, ham, hot dogs, and sausage    Canned beans and canned meats or spreads, such as potted meats, sardines, anchovies, and imitation seafood    Deli or lunch meats, such as bologna, ham, turkey, and roast beef    High-fat meat (T-bone steak, regular hamburger, and ribs)    Whole eggs and egg yolks    Whole milk, 2% milk, and cream    Regular cheese and processed cheese    Other guidelines to follow:   Maintain a healthy weight. Your risk for heart disease is higher if you have extra weight. Ask your healthcare provider what a healthy weight is for you. Your provider may suggest that you lose weight. You can lose weight by eating fewer calories and foods that have added sugars and fat. The DASH meal plan can help you do this. Decrease calories by eating smaller portions at each meal and fewer snacks. Ask your provider for more information about how to lose weight. Exercise regularly. Regular exercise can help you reach or maintain a healthy weight. Regular exercise can also help decrease your blood pressure and improve your cholesterol levels. Get 30 minutes or more of moderate exercise each day of the week. To lose weight, get at least 60 minutes of exercise. Talk to your healthcare provider about the best exercise program for you. Limit alcohol. Women should limit alcohol to 1 drink a day. Men should limit alcohol to 2 drinks a day. A drink of alcohol is 12 ounces of beer, 5 ounces of wine, or 1½ ounces of liquor. For more information:   National [employer], [facility] and [address]  [id number]  [place] [address]  Phone: [phone]  Web Address: [facility]    © Copyright Merative 2023 Information is for End User's use only and may not be sold, redistributed or otherwise used for commercial purposes. The above information is an  only. It is not intended as medical advice for individual conditions or treatments. Talk to your doctor, nurse or pharmacist before following any medical regimen to see if it is safe and effective for you.

## 2023-10-10 NOTE — PROGRESS NOTES
[id number] [facility]  FAMILY PRACTICE    NAME: Avinash Turk  AGE: 51 y.o. SEX: male  : 1975     DATE: 10/10/2023     Assessment and Plan:     Problem List Items Addressed This Visit        Digestive    Ho's esophagus without dysplasia     Continue to follow with Dr. Michelle Miller. Continue PPI. Other    Cigarette nicotine dependence without complication     Continues to smoke. Smoking cessation recommended. Hyperlipidemia     Continue Crestor. Still with chronic elevation of triglycerides. Lipid panel ordered for surveillance. Other Visit Diagnoses     Annual physical exam    -  Primary    Relevant Orders    Lipid panel    Basic metabolic panel    CBC and Platelet    BMI 59.1-61.1,BKKEP        Colon cancer screening        Relevant Orders    Ambulatory Referral to Gastroenterology    Encounter for administration of vaccine        Relevant Orders    influenza vaccine, quadrivalent, 0.5 mL, preservative-free, for adult and pediatric patients 6 mos+ (AFLURIA, FLUARIX, FLULAVAL, FLUZONE) (Completed)          Immunizations and preventive care screenings were discussed with patient today. Appropriate education was printed on patient's after visit summary. Counseling:  Alcohol/drug use: discussed moderation in alcohol intake, the recommendations for healthy alcohol use, and avoidance of illicit drug use. Dental Health: discussed importance of regular tooth brushing, flossing, and dental visits. Injury prevention: discussed safety/seat belts, safety helmets, smoke detectors, carbon dioxide detectors, and smoking near bedding or upholstery. Sexual health: discussed sexually transmitted diseases, partner selection, use of condoms, avoidance of unintended pregnancy, and contraceptive alternatives. Exercise: the importance of regular exercise/physical activity was discussed.  Recommend exercise 3-5 times per week for at least 30 minutes. BMI Counseling: Body mass index is 26.43 kg/m². The BMI is above normal. Nutrition recommendations include decreasing portion sizes, encouraging healthy choices of fruits and vegetables, limiting drinks that contain sugar, moderation in carbohydrate intake, increasing intake of lean protein, reducing intake of saturated and trans fat and reducing intake of cholesterol. Exercise recommendations include moderate physical activity 150 minutes/week and exercising 3-5 times per week. Rationale for BMI follow-up plan is due to patient being overweight or obese. Tobacco Cessation Counseling: The patient is sincerely urged to quit consumption of tobacco. He is not ready to quit tobacco.         Return in about 6 months (around 4/10/2024) for Dr. Belen Vang, Office Visit. Chief Complaint:     Chief Complaint   Patient presents with   • Physical Exam     Annual + pt would like flu shot, no covid shots this year      History of Present Illness:     Adult Annual Physical   Patient here for a comprehensive physical exam. The patient reports no problems. Diet and Physical Activity  Diet/Nutrition: well balanced diet, limited junk food, low fat diet and limited fruits/vegetables. Exercise: no formal exercise. Depression Screening  PHQ-2/9 Depression Screening         General Health  Sleep: gets 4-6 hours of sleep on average and gets 7-8 hours of sleep on average. Hearing: normal - bilateral.  Vision: goes for regular eye exams, most recent eye exam <1 year ago, wears glasses and wears contacts. Dental: no dental visits for >1 year and brushes teeth twice daily.  Health  Symptoms include: none    Advanced Care Planning  Do you have an advanced directive? no  Do you have a durable medical power of ? no     Review of Systems:     Review of Systems   Constitutional: Negative. Negative for fatigue. HENT: Negative.   Negative for congestion, postnasal drip, rhinorrhea and trouble swallowing. Eyes: Negative. Negative for visual disturbance. Respiratory: Negative. Negative for choking and shortness of breath. Cardiovascular: Negative. Negative for chest pain. Gastrointestinal: Negative. Endocrine: Negative. Genitourinary: Negative. Musculoskeletal: Negative. Negative for arthralgias, back pain, myalgias and neck pain. Skin: Negative. Neurological: Negative for dizziness and headaches. Psychiatric/Behavioral: Negative. Past Medical History:     Past Medical History:   Diagnosis Date   • Allergic    • Ho's esophagus    • Cervical disc disorder    • Diverticulitis of colon    • Eczema    • GERD (gastroesophageal reflux disease)    • Hyperlipidemia    • Migraine    • Streptococcal pharyngitis       Past Surgical History:     Past Surgical History:   Procedure Laterality Date   • APPENDECTOMY     • US GUIDED MSK PROCEDURE  7/31/2020      Family History:     Family History   Problem Relation Age of Onset   • Hyperlipidemia Mother    • Sleep apnea Brother    • Diverticulitis Brother       Social History:     Social History     Socioeconomic History   • Marital status: Single     Spouse name: None   • Number of children: None   • Years of education: None   • Highest education level: None   Occupational History   • None   Tobacco Use   • Smoking status: Some Days     Packs/day: 0.25     Years: 30.00     Total pack years: 7.50     Types: Cigarettes   • Smokeless tobacco: Never   Vaping Use   • Vaping Use: Never used   Substance and Sexual Activity   • Alcohol use:  Yes     Alcohol/week: 2.0 standard drinks of alcohol     Types: 2 Standard drinks or equivalent per week     Comment: occasional   • Drug use: Yes     Comment: medical marijuana   • Sexual activity: Not Currently     Partners: Female   Other Topics Concern   • None   Social History Narrative   • None     Social Determinants of Health     Financial Resource Strain: Not on file   Food Insecurity: Not on file   Transportation Needs: Not on file   Physical Activity: Not on file   Stress: Not on file   Social Connections: Not on file   Intimate Partner Violence: Not on file   Housing Stability: Not on file      Current Medications:     Current Outpatient Medications   Medication Sig Dispense Refill   • latanoprost (XALATAN) 0.005 % ophthalmic solution INSTILL 1 DROP INTO BOTH EYES AT BEDTIME, STOCK SIZE 2.5ML  3   • pantoprazole (PROTONIX) 40 mg tablet TAKE 1 TABLET BY MOUTH EVERY DAY 30 tablet 6   • rosuvastatin (CRESTOR) 10 MG tablet TAKE 1 TABLET BY MOUTH EVERY DAY 90 tablet 1     No current facility-administered medications for this visit. Allergies:     No Known Allergies   Physical Exam:     /88   Pulse 62   Temp (!) 97.3 °F (36.3 °C) (Temporal)   Resp 18   Ht 5' 9" (1.753 m)   Wt 81.2 kg (179 lb)   SpO2 96%   BMI 26.43 kg/m²     Physical Exam  Vitals and nursing note reviewed. Constitutional:       Appearance: Normal appearance. He is well-developed and normal weight. HENT:      Head: Normocephalic and atraumatic. Right Ear: Tympanic membrane, ear canal and external ear normal. There is no impacted cerumen. Left Ear: Tympanic membrane, ear canal and external ear normal. There is no impacted cerumen. Nose: Nose normal.      Mouth/Throat:      Mouth: Mucous membranes are moist.      Pharynx: Oropharynx is clear. Eyes:      Conjunctiva/sclera: Conjunctivae normal.   Cardiovascular:      Rate and Rhythm: Normal rate and regular rhythm. Pulses: Normal pulses. Heart sounds: Normal heart sounds. No murmur heard. Pulmonary:      Effort: Pulmonary effort is normal. No respiratory distress. Breath sounds: Normal breath sounds. Abdominal:      General: Bowel sounds are normal. There is no distension. Palpations: Abdomen is soft. There is no mass. Tenderness: There is no abdominal tenderness. There is no guarding or rebound.       Hernia: No hernia is present. Musculoskeletal:         General: Normal range of motion. Cervical back: Normal range of motion and neck supple. Skin:     General: Skin is warm and dry. Neurological:      General: No focal deficit present. Mental Status: He is alert and oriented to person, place, and time. Mental status is at baseline. Psychiatric:         Mood and Affect: Mood normal.         Behavior: Behavior normal.         Thought Content:  Thought content normal.         Judgment: Judgment normal.          Christopher Haider, [address]

## 2023-10-26 ENCOUNTER — HOSPITAL ENCOUNTER (OUTPATIENT)
Dept: RADIOLOGY | Facility: HOSPITAL | Age: 48
Discharge: HOME | End: 2023-10-26
Attending: INTERNAL MEDICINE
Payer: COMMERCIAL

## 2023-10-26 DIAGNOSIS — R93.3 ABNORMAL FINDINGS ON DX IMAGING OF PRT DIGESTIVE TRACT: ICD-10-CM

## 2023-10-26 PROCEDURE — 76705 ECHO EXAM OF ABDOMEN: CPT

## 2023-11-28 ENCOUNTER — HOSPITAL ENCOUNTER (EMERGENCY)
Facility: HOSPITAL | Age: 48
Discharge: HOME | End: 2023-11-28
Attending: EMERGENCY MEDICINE
Payer: COMMERCIAL

## 2023-11-28 ENCOUNTER — APPOINTMENT (EMERGENCY)
Dept: RADIOLOGY | Facility: HOSPITAL | Age: 48
End: 2023-11-28
Payer: COMMERCIAL

## 2023-11-28 VITALS
RESPIRATION RATE: 20 BRPM | SYSTOLIC BLOOD PRESSURE: 160 MMHG | OXYGEN SATURATION: 99 % | TEMPERATURE: 97.7 F | HEART RATE: 56 BPM | DIASTOLIC BLOOD PRESSURE: 94 MMHG

## 2023-11-28 DIAGNOSIS — K57.92 DIVERTICULITIS: Primary | ICD-10-CM

## 2023-11-28 DIAGNOSIS — E78.2 MIXED HYPERLIPIDEMIA: ICD-10-CM

## 2023-11-28 LAB
ALBUMIN SERPL BCP-MCNC: 4.3 G/DL (ref 3.5–5)
ALP SERPL-CCNC: 77 U/L (ref 34–104)
ALT SERPL W P-5'-P-CCNC: 15 U/L (ref 7–52)
ANION GAP SERPL CALCULATED.3IONS-SCNC: 7 MMOL/L
AST SERPL W P-5'-P-CCNC: 17 U/L (ref 13–39)
ATRIAL RATE: 56 BPM
BASOPHILS # BLD AUTO: 0.04 THOUSANDS/ÂΜL (ref 0–0.1)
BASOPHILS NFR BLD AUTO: 0 % (ref 0–1)
BILIRUB SERPL-MCNC: 0.51 MG/DL (ref 0.2–1)
BUN SERPL-MCNC: 18 MG/DL (ref 5–25)
CALCIUM SERPL-MCNC: 9.2 MG/DL (ref 8.4–10.2)
CHLORIDE SERPL-SCNC: 105 MMOL/L (ref 96–108)
CO2 SERPL-SCNC: 28 MMOL/L (ref 21–32)
CREAT SERPL-MCNC: 0.41 MG/DL (ref 0.6–1.3)
EOSINOPHIL # BLD AUTO: 0.12 THOUSAND/ÂΜL (ref 0–0.61)
EOSINOPHIL NFR BLD AUTO: 1 % (ref 0–6)
ERYTHROCYTE [DISTWIDTH] IN BLOOD BY AUTOMATED COUNT: 12.6 % (ref 11.6–15.1)
GFR SERPL CREATININE-BSD FRML MDRD: 138 ML/MIN/1.73SQ M
GLUCOSE SERPL-MCNC: 101 MG/DL (ref 65–140)
HCT VFR BLD AUTO: 43.2 % (ref 36.5–49.3)
HGB BLD-MCNC: 14.7 G/DL (ref 12–17)
IMM GRANULOCYTES # BLD AUTO: 0.04 THOUSAND/UL (ref 0–0.2)
IMM GRANULOCYTES NFR BLD AUTO: 0 % (ref 0–2)
LIPASE SERPL-CCNC: 25 U/L (ref 11–82)
LYMPHOCYTES # BLD AUTO: 2.02 THOUSANDS/ÂΜL (ref 0.6–4.47)
LYMPHOCYTES NFR BLD AUTO: 18 % (ref 14–44)
MCH RBC QN AUTO: 31.7 PG (ref 26.8–34.3)
MCHC RBC AUTO-ENTMCNC: 34 G/DL (ref 31.4–37.4)
MCV RBC AUTO: 93 FL (ref 82–98)
MONOCYTES # BLD AUTO: 0.85 THOUSAND/ÂΜL (ref 0.17–1.22)
MONOCYTES NFR BLD AUTO: 8 % (ref 4–12)
NEUTROPHILS # BLD AUTO: 8.06 THOUSANDS/ÂΜL (ref 1.85–7.62)
NEUTS SEG NFR BLD AUTO: 73 % (ref 43–75)
NRBC BLD AUTO-RTO: 0 /100 WBCS
P AXIS: 19 DEGREES
PLATELET # BLD AUTO: 225 THOUSANDS/UL (ref 149–390)
PMV BLD AUTO: 11.9 FL (ref 8.9–12.7)
POTASSIUM SERPL-SCNC: 3.8 MMOL/L (ref 3.5–5.3)
PR INTERVAL: 146 MS
PROT SERPL-MCNC: 6.8 G/DL (ref 6.4–8.4)
QRS AXIS: 45 DEGREES
QRSD INTERVAL: 94 MS
QT INTERVAL: 396 MS
QTC INTERVAL: 382 MS
RBC # BLD AUTO: 4.63 MILLION/UL (ref 3.88–5.62)
SODIUM SERPL-SCNC: 140 MMOL/L (ref 135–147)
T WAVE AXIS: 32 DEGREES
VENTRICULAR RATE: 56 BPM
WBC # BLD AUTO: 11.13 THOUSAND/UL (ref 4.31–10.16)

## 2023-11-28 PROCEDURE — 85025 COMPLETE CBC W/AUTO DIFF WBC: CPT

## 2023-11-28 PROCEDURE — 36415 COLL VENOUS BLD VENIPUNCTURE: CPT

## 2023-11-28 PROCEDURE — G1004 CDSM NDSC: HCPCS

## 2023-11-28 PROCEDURE — 83690 ASSAY OF LIPASE: CPT

## 2023-11-28 PROCEDURE — 93005 ELECTROCARDIOGRAM TRACING: CPT

## 2023-11-28 PROCEDURE — 96366 THER/PROPH/DIAG IV INF ADDON: CPT

## 2023-11-28 PROCEDURE — 96365 THER/PROPH/DIAG IV INF INIT: CPT

## 2023-11-28 PROCEDURE — 74177 CT ABD & PELVIS W/CONTRAST: CPT

## 2023-11-28 PROCEDURE — 99285 EMERGENCY DEPT VISIT HI MDM: CPT | Performed by: EMERGENCY MEDICINE

## 2023-11-28 PROCEDURE — 99284 EMERGENCY DEPT VISIT MOD MDM: CPT

## 2023-11-28 PROCEDURE — 96375 TX/PRO/DX INJ NEW DRUG ADDON: CPT

## 2023-11-28 PROCEDURE — 80053 COMPREHEN METABOLIC PANEL: CPT

## 2023-11-28 RX ORDER — SODIUM CHLORIDE, SODIUM GLUCONATE, SODIUM ACETATE, POTASSIUM CHLORIDE, MAGNESIUM CHLORIDE, SODIUM PHOSPHATE, DIBASIC, AND POTASSIUM PHOSPHATE .53; .5; .37; .037; .03; .012; .00082 G/100ML; G/100ML; G/100ML; G/100ML; G/100ML; G/100ML; G/100ML
1000 INJECTION, SOLUTION INTRAVENOUS ONCE
Status: COMPLETED | OUTPATIENT
Start: 2023-11-28 | End: 2023-11-28

## 2023-11-28 RX ORDER — HYDROMORPHONE HCL/PF 1 MG/ML
0.5 SYRINGE (ML) INJECTION ONCE
Status: COMPLETED | OUTPATIENT
Start: 2023-11-28 | End: 2023-11-28

## 2023-11-28 RX ORDER — ROSUVASTATIN CALCIUM 10 MG/1
TABLET, COATED ORAL
Qty: 90 TABLET | Refills: 1 | Status: SHIPPED | OUTPATIENT
Start: 2023-11-28

## 2023-11-28 RX ORDER — AMOXICILLIN AND CLAVULANATE POTASSIUM 875; 125 MG/1; MG/1
1 TABLET, FILM COATED ORAL EVERY 12 HOURS
Qty: 20 TABLET | Refills: 0 | Status: SHIPPED | OUTPATIENT
Start: 2023-11-28 | End: 2023-12-08

## 2023-11-28 RX ADMIN — IOHEXOL 100 ML: 350 INJECTION, SOLUTION INTRAVENOUS at 10:09

## 2023-11-28 RX ADMIN — SODIUM CHLORIDE, SODIUM GLUCONATE, SODIUM ACETATE, POTASSIUM CHLORIDE, MAGNESIUM CHLORIDE, SODIUM PHOSPHATE, DIBASIC, AND POTASSIUM PHOSPHATE 1000 ML: .53; .5; .37; .037; .03; .012; .00082 INJECTION, SOLUTION INTRAVENOUS at 07:39

## 2023-11-28 RX ADMIN — HYDROMORPHONE HYDROCHLORIDE 0.5 MG: 1 INJECTION, SOLUTION INTRAMUSCULAR; INTRAVENOUS; SUBCUTANEOUS at 07:39

## 2023-11-28 NOTE — Clinical Note
Kem White was seen and treated in our emergency department on 11/28/2023. Diagnosis:     Girish Dunne  . He may return on this date: 11/30/2023         If you have any questions or concerns, please don't hesitate to call.       Karsten Rubinstein, DO    ______________________________           _______________          _______________  Hospital Representative                              Date                                Time

## 2023-11-28 NOTE — ED ATTENDING ATTESTATION
Final Diagnoses:     1. Diverticulitis           Theo BURK MD, saw and evaluated the patient. All available labs and X-rays were ordered by me or the resident / non-physician and have been reviewed by myself. I discussed the patient with the resident / non-physician and agree with the resident's / non-physician practitioner's findings and plan as documented in the resident's / non-physician practicitioner's note, except where noted. At this point, I agree with the current assessment done in the ED. I was present during key portions of all procedures performed unless otherwise stated. HPI:  NURSING TRIAGE:    This is a 51 y.o. male presenting for evaluation of abdominal pain, diffuse. Started yesterday at 2am (about 30+ hours of symptoms). Went to work despite the pain and ate lunch, feeling better. Normal day, went to bed ? felt worse so came in. No f/ch/n/v. No diarrhea  No constipation, but has had reduced stooling. No HA/CP/SOB. Denies any urinary tract infection symptoms (burning, itching, pain, blood, frequency). Denies any upper respiratory tract infection symptoms (cough, congestion, rhinorrhea, sore throat). No radiation of pain. PMH: diverticulitis Chief Complaint   Patient presents with    Abdominal Pain     Pt reports sudden onset of gen abd pain starting this morning around 0400, denies n/v/d. Pt states he was admitted this past January with diverticulitis. PHYSICAL: ASSESSMENT + PLAN:   Pertinent: diffuse tenderness  Maximal in the LLQ + periumbilical + suprapubpic. No CVAT    General: VS reviewed  Appears in NAD  awake, alert. Well-nourished, well-developed. Appears stated age. Speaking normally in full sentences. Head: Normocephalic, atraumatic  Eyes: EOM-I. No diplopia. No hyphema. No subconjunctival hemorrhages. Symmetrical lids. ENT: Atraumatic external nose and ears. MMM  No malocclusion. No stridor. Normal phonation. No drooling. Normal swallowing. Neck: No JVD. CV: No pallor noted  Lungs:   No tachypnea  No respiratory distress  Abd: soft nt nd no rebound/guarding  MSK:   FROM spontaneously  Skin: Dry, intact. Neuro: Awake, alert, GCS15, CN II-XII grossly intact. Motor grossly intact. Psychiatric/Behavioral: interacting normally; appropriate mood/affect.  Exam: deferred    Vitals:    11/28/23 0658 11/28/23 0800 11/28/23 1000   BP: (!) 181/109 161/59 160/94   BP Location: Left arm Left arm Left arm   Pulse: 57 84 56   Resp: 20 18 20   Temp: 97.7 °F (36.5 °C)     TempSrc: Tympanic     SpO2: 100% 99% 99%    - given the presentation, will check CBC for marked leukocytosis  - CMP for liver enzyme elevation that could signal cholecystitis, biliary obstructive disease. Check RFTs for CHRISTIN / markers of dehydration.  - Lipase given abdominal pain to evaluate specifically for pancreatitis. - Urine: will check for UTI or signs of pyelonephritis. - Lastly, will consider abdominal imaging.  - CT AP w Contrast: r/o appendicitis, cholecystitis, bowel obstruction or other acute abdominal pathology. Would also demonstrate signs of pyelonephritis, cystitis. Hx of diverticulitis that required 4 day stay previously. - Disposition per workup. There are no obvious limitations to social determinants of care. Nursing note reviewed. Vitals reviewed. Orders placed by myself and/or advanced practitioner / resident. Previous chart was reviewed  No language barrier. History obtained from patient. There are no limitations to the history obtained:     Past Medical: Past Surgical:    has a past medical history of Allergic, Ho's esophagus, Cervical disc disorder, Diverticulitis of colon, Eczema, GERD (gastroesophageal reflux disease), Hyperlipidemia, Migraine, and Streptococcal pharyngitis. has a past surgical history that includes Appendectomy and US guided msk procedure (7/31/2020).    Social: Cardiac (Echo/Cath)   Social History Substance and Sexual Activity   Alcohol Use Yes    Alcohol/week: 2.0 standard drinks of alcohol    Types: 2 Standard drinks or equivalent per week    Comment: occasional     Social History     Tobacco Use   Smoking Status Some Days    Packs/day: 0.25    Years: 30.00    Total pack years: 7.50    Types: Cigarettes   Smokeless Tobacco Never     Social History     Substance and Sexual Activity   Drug Use Yes    Comment: medical marijuana    No results found for this or any previous visit. No results found for this or any previous visit. No results found for this or any previous visit.      Labs: Imaging:   Labs Reviewed   CBC AND DIFFERENTIAL - Abnormal       Result Value Ref Range Status    WBC 11.13 (*) 4.31 - 10.16 Thousand/uL Final    RBC 4.63  3.88 - 5.62 Million/uL Final    Hemoglobin 14.7  12.0 - 17.0 g/dL Final    Hematocrit 43.2  36.5 - 49.3 % Final    MCV 93  82 - 98 fL Final    MCH 31.7  26.8 - 34.3 pg Final    MCHC 34.0  31.4 - 37.4 g/dL Final    RDW 12.6  11.6 - 15.1 % Final    MPV 11.9  8.9 - 12.7 fL Final    Platelets 082  074 - 390 Thousands/uL Final    nRBC 0  /100 WBCs Final    Neutrophils Relative 73  43 - 75 % Final    Immat GRANS % 0  0 - 2 % Final    Lymphocytes Relative 18  14 - 44 % Final    Monocytes Relative 8  4 - 12 % Final    Eosinophils Relative 1  0 - 6 % Final    Basophils Relative 0  0 - 1 % Final    Neutrophils Absolute 8.06 (*) 1.85 - 7.62 Thousands/µL Final    Immature Grans Absolute 0.04  0.00 - 0.20 Thousand/uL Final    Lymphocytes Absolute 2.02  0.60 - 4.47 Thousands/µL Final    Monocytes Absolute 0.85  0.17 - 1.22 Thousand/µL Final    Eosinophils Absolute 0.12  0.00 - 0.61 Thousand/µL Final    Basophils Absolute 0.04  0.00 - 0.10 Thousands/µL Final   COMPREHENSIVE METABOLIC PANEL - Abnormal    Sodium 140  135 - 147 mmol/L Final    Potassium 3.8  3.5 - 5.3 mmol/L Final    Chloride 105  96 - 108 mmol/L Final    CO2 28  21 - 32 mmol/L Final    ANION GAP 7  mmol/L Final BUN 18  5 - 25 mg/dL Final    Creatinine 0.41 (*) 0.60 - 1.30 mg/dL Final    Comment: Standardized to IDMS reference method    Glucose 101  65 - 140 mg/dL Final    Comment: If the patient is fasting, the ADA then defines impaired fasting glucose as > 100 mg/dL and diabetes as > or equal to 123 mg/dL. Calcium 9.2  8.4 - 10.2 mg/dL Final    AST 17  13 - 39 U/L Final    ALT 15  7 - 52 U/L Final    Comment: Specimen collection should occur prior to Sulfasalazine administration due to the potential for falsely depressed results. Alkaline Phosphatase 77  34 - 104 U/L Final    Total Protein 6.8  6.4 - 8.4 g/dL Final    Albumin 4.3  3.5 - 5.0 g/dL Final    Total Bilirubin 0.51  0.20 - 1.00 mg/dL Final    Comment: Use of this assay is not recommended for patients undergoing treatment with eltrombopag due to the potential for falsely elevated results. N-acetyl-p-benzoquinone imine (metabolite of Acetaminophen) will generate erroneously low results in samples for patients that have taken an overdose of Acetaminophen. eGFR 138  ml/min/1.73sq m Final    Narrative:     [place] guidelines for Chronic Kidney Disease (CKD):     Stage 1 with normal or high GFR (GFR > 90 mL/min/1.73 square meters)    Stage 2 Mild CKD (GFR = 60-89 mL/min/1.73 square meters)    Stage 3A Moderate CKD (GFR = 45-59 mL/min/1.73 square meters)    Stage 3B Moderate CKD (GFR = 30-44 mL/min/1.73 square meters)    Stage 4 Severe CKD (GFR = 15-29 mL/min/1.73 square meters)    Stage 5 End Stage CKD (GFR <15 mL/min/1.73 square meters)  Note: GFR calculation is accurate only with a steady state creatinine   LIPASE - Normal    Lipase 25  11 - 82 u/L Final    CT abdomen pelvis with contrast   Final Result      1. Sigmoid diverticulosis. Questionable minimal acute inflammatory change occurring at the junction of the sigmoid colon and descending colon which may represent very mild acute diverticulitis.  No extraluminal abscess, free fluid, or extraluminal air   2. No other acute inflammatory changes in the abdomen or pelvis   3. 0.3 cm right lower lobe pulmonary nodule. Based on current Fleischner Society 2017 Guidelines on incidental pulmonary nodule, no routine follow-up is needed if the patient is low risk. If the patient is high risk, optional follow-up chest CT at 12    months can be considered. Workstation performed: [id number]            Medications: Code Status:   Medications   multi-electrolyte (ISOLYTE-S PH 7.4) bolus 1,000 mL (0 mL Intravenous Stopped 11/28/23 1057)   HYDROmorphone (DILAUDID) injection 0.5 mg (0.5 mg Intravenous Given 11/28/23 0739)   iohexol (OMNIPAQUE) 350 MG/ML injection (MULTI-DOSE) 100 mL (100 mL Intravenous Given 11/28/23 1009)    Code Status: No Order  Advance Directive and Living Will:      Power of :    POLST:       Orders Placed This Encounter   Procedures    CT abdomen pelvis with contrast    CBC and differential    Comprehensive metabolic panel    Lipase    ECG 12 lead     Time reflects when diagnosis was documented in both MDM as applicable and the Disposition within this note       Time User Action Codes Description Comment    11/28/2023 10:47 AM Melissa Vazquez Add [K57.92] Diverticulitis           ED Disposition       ED Disposition   Discharge    Condition   Stable    Date/Time   Tue Nov 28, 2023 10:37 AM    Comment   [address] [facility] discharge to home/self care.                    Follow-up Information       Follow up With Specialties Details Why Contact Info Additional Information    Chris Benitez MD Family Medicine Schedule an appointment as soon as possible for a visit  for follow up [address] [facility] [id number]       [address] [place] [address] Emergency Department Emergency Medicine Go to  As needed, If symptoms worsen [address] [facility] Emergency Department, [phone] [facility], [place], [place], [place] [facility]    [facility]   [address] [facility] [id number] [phone] [facility] [place], [address], [address] [place] [address], [place], [place], [address]          Discharge Medication List as of 11/28/2023 10:47 AM        START taking these medications    Details   amoxicillin-clavulanate (AUGMENTIN) 875-125 mg per tablet Take 1 tablet by mouth every 12 (twelve) hours for 10 days, Starting Tue 11/28/2023, Until Fri 12/8/2023, Normal           CONTINUE these medications which have NOT CHANGED    Details   latanoprost (XALATAN) 0.005 % ophthalmic solution INSTILL 1 DROP INTO BOTH EYES AT BEDTIME, STOCK SIZE 2.5ML, Historical Med      pantoprazole (PROTONIX) 40 mg tablet TAKE 1 TABLET BY MOUTH EVERY DAY, Normal      rosuvastatin (CRESTOR) 10 MG tablet TAKE 1 TABLET BY MOUTH EVERY DAY, Normal           No discharge procedures on file. Prior to Admission Medications   Prescriptions Last Dose Informant Patient Reported? Taking?   latanoprost (XALATAN) 0.005 % ophthalmic solution  Self Yes No   Sig: INSTILL 1 DROP INTO BOTH EYES AT BEDTIME, STOCK SIZE 2.5ML   pantoprazole (PROTONIX) 40 mg tablet   No No   Sig: TAKE 1 TABLET BY MOUTH EVERY DAY      Facility-Administered Medications: None                        Portions of the record may have been created with voice recognition software. Occasional wrong word or "sound a like" substitutions may have occurred due to the inherent limitations of voice recognition software. Read the chart carefully and recognize, using context, where substitutions have occurred.     Electronically signed by:  Terrie Finn

## 2023-11-28 NOTE — ED PROVIDER NOTES
History  Chief Complaint   Patient presents with    Abdominal Pain     Pt reports sudden onset of gen abd pain starting this morning around 0400, denies n/v/d. Pt states he was admitted this past January with diverticulitis. Belly pain started at 2AM yesterday, went about his day and it got better, 4am this morning pain worsened and he decided to come in. Denies N/V/D/C. Notes less frequent bowel movement.s HX of diverticulitis with hospitalizations as well as barrets esophagus. Denies blood in stools. Denies fevers, chills, CP, SOB, dysuria, hematuria, no flank pain. Recently colonoscopy in last month. Prior to Admission Medications   Prescriptions Last Dose Informant Patient Reported? Taking?   latanoprost (XALATAN) 0.005 % ophthalmic solution  Self Yes No   Sig: INSTILL 1 DROP INTO BOTH EYES AT BEDTIME, STOCK SIZE 2.5ML   pantoprazole (PROTONIX) 40 mg tablet   No No   Sig: TAKE 1 TABLET BY MOUTH EVERY DAY      Facility-Administered Medications: None       Past Medical History:   Diagnosis Date    Allergic     Ho's esophagus     Cervical disc disorder     Diverticulitis of colon     Eczema     GERD (gastroesophageal reflux disease)     Hyperlipidemia     Migraine     Streptococcal pharyngitis        Past Surgical History:   Procedure Laterality Date    APPENDECTOMY      US GUIDED MSK PROCEDURE  7/31/2020       Family History   Problem Relation Age of Onset    Hyperlipidemia Mother     Sleep apnea Brother     Diverticulitis Brother      I have reviewed and agree with the history as documented. E-Cigarette/Vaping    E-Cigarette Use Never User      E-Cigarette/Vaping Substances     Social History     Tobacco Use    Smoking status: Some Days     Packs/day: 0.25     Years: 30.00     Total pack years: 7.50     Types: Cigarettes    Smokeless tobacco: Never   Vaping Use    Vaping Use: Never used   Substance Use Topics    Alcohol use:  Yes     Alcohol/week: 2.0 standard drinks of alcohol     Types: 2 Standard drinks or equivalent per week     Comment: occasional    Drug use: Yes     Comment: medical marijuana        Review of Systems   Constitutional:  Positive for activity change. Negative for chills and fever. HENT:  Negative for ear pain and sore throat. Eyes:  Negative for pain and visual disturbance. Respiratory:  Negative for cough and shortness of breath. Cardiovascular:  Negative for chest pain and palpitations. Gastrointestinal:  Positive for abdominal pain. Negative for constipation, diarrhea, nausea and vomiting. Genitourinary:  Negative for dysuria, flank pain and hematuria. Musculoskeletal:  Negative for arthralgias, back pain and neck pain. Skin:  Negative for color change and rash. Neurological:  Negative for dizziness, seizures, syncope, weakness, light-headedness and headaches. Psychiatric/Behavioral:  Negative for agitation and behavioral problems. All other systems reviewed and are negative. Physical Exam  ED Triage Vitals   Temperature Pulse Respirations Blood Pressure SpO2   11/28/23 0658 11/28/23 0658 11/28/23 0658 11/28/23 0658 11/28/23 0658   97.7 °F (36.5 °C) 57 20 (!) 181/109 100 %      Temp Source Heart Rate Source Patient Position - Orthostatic VS BP Location FiO2 (%)   11/28/23 0658 11/28/23 0658 11/28/23 0658 11/28/23 0658 --   Tympanic Monitor Lying Left arm       Pain Score       11/28/23 0739       10 - Worst Possible Pain             Orthostatic Vital Signs  Vitals:    11/28/23 0658 11/28/23 0800 11/28/23 1000   BP: (!) 181/109 161/59 160/94   Pulse: 57 84 56   Patient Position - Orthostatic VS: Lying Lying Lying       Physical Exam  Vitals and nursing note reviewed. Constitutional:       General: He is not in acute distress. Appearance: He is well-developed. HENT:      Head: Normocephalic and atraumatic. Mouth/Throat:      Mouth: Mucous membranes are moist.   Eyes:      Extraocular Movements: Extraocular movements intact. Conjunctiva/sclera: Conjunctivae normal.   Cardiovascular:      Rate and Rhythm: Normal rate and regular rhythm. Heart sounds: No murmur heard. Pulmonary:      Effort: Pulmonary effort is normal. No respiratory distress. Breath sounds: Normal breath sounds. Abdominal:      General: Abdomen is flat. Bowel sounds are normal.      Palpations: Abdomen is soft. Tenderness: There is abdominal tenderness in the left lower quadrant. Musculoskeletal:         General: No swelling. Cervical back: Neck supple. Skin:     General: Skin is warm and dry. Capillary Refill: Capillary refill takes less than 2 seconds. Neurological:      General: No focal deficit present. Mental Status: He is alert and oriented to person, place, and time.    Psychiatric:         Mood and Affect: Mood normal.         Behavior: Behavior normal.         ED Medications  Medications   multi-electrolyte (ISOLYTE-S PH 7.4) bolus 1,000 mL (0 mL Intravenous Stopped 11/28/23 1057)   HYDROmorphone (DILAUDID) injection 0.5 mg (0.5 mg Intravenous Given 11/28/23 0739)   iohexol (OMNIPAQUE) 350 MG/ML injection (MULTI-DOSE) 100 mL (100 mL Intravenous Given 11/28/23 1009)       Diagnostic Studies  Results Reviewed       Procedure Component Value Units Date/Time    Comprehensive metabolic panel [[id number]]  (Abnormal) Collected: 11/28/23 0740    Lab Status: Final result Specimen: Blood from Arm, Right Updated: 11/28/23 0921     Sodium 140 mmol/L      Potassium 3.8 mmol/L      Chloride 105 mmol/L      CO2 28 mmol/L      ANION GAP 7 mmol/L      BUN 18 mg/dL      Creatinine 0.41 mg/dL      Glucose 101 mg/dL      Calcium 9.2 mg/dL      AST 17 U/L      ALT 15 U/L      Alkaline Phosphatase 77 U/L      Total Protein 6.8 g/dL      Albumin 4.3 g/dL      Total Bilirubin 0.51 mg/dL      eGFR 138 ml/min/1.73sq m     Narrative:      [place] guidelines for Chronic Kidney Disease (CKD):     Stage 1 with normal or high GFR (GFR > 90 mL/min/1.73 square meters)    Stage 2 Mild CKD (GFR = 60-89 mL/min/1.73 square meters)    Stage 3A Moderate CKD (GFR = 45-59 mL/min/1.73 square meters)    Stage 3B Moderate CKD (GFR = 30-44 mL/min/1.73 square meters)    Stage 4 Severe CKD (GFR = 15-29 mL/min/1.73 square meters)    Stage 5 End Stage CKD (GFR <15 mL/min/1.73 square meters)  Note: GFR calculation is accurate only with a steady state creatinine    Lipase [[id number]]  (Normal) Collected: 11/28/23 0740    Lab Status: Final result Specimen: Blood from Arm, Right Updated: 11/28/23 0921     Lipase 25 u/L     CBC and differential [[id number]]  (Abnormal) Collected: 11/28/23 0740    Lab Status: Final result Specimen: Blood from Arm, Right Updated: 11/28/23 0806     WBC 11.13 Thousand/uL      RBC 4.63 Million/uL      Hemoglobin 14.7 g/dL      Hematocrit 43.2 %      MCV 93 fL      MCH 31.7 pg      MCHC 34.0 g/dL      RDW 12.6 %      MPV 11.9 fL      Platelets 169 Thousands/uL      nRBC 0 /100 WBCs      Neutrophils Relative 73 %      Immat GRANS % 0 %      Lymphocytes Relative 18 %      Monocytes Relative 8 %      Eosinophils Relative 1 %      Basophils Relative 0 %      Neutrophils Absolute 8.06 Thousands/µL      Immature Grans Absolute 0.04 Thousand/uL      Lymphocytes Absolute 2.02 Thousands/µL      Monocytes Absolute 0.85 Thousand/µL      Eosinophils Absolute 0.12 Thousand/µL      Basophils Absolute 0.04 Thousands/µL                    CT abdomen pelvis with contrast   Final Result by Eugenio Foy MD (11/28 1035)      1. Sigmoid diverticulosis. Questionable minimal acute inflammatory change occurring at the junction of the sigmoid colon and descending colon which may represent very mild acute diverticulitis. No extraluminal abscess, free fluid, or extraluminal air   2. No other acute inflammatory changes in the abdomen or pelvis   3. 0.3 cm right lower lobe pulmonary nodule.  Based on current Fleischner Society 2017 Guidelines on incidental pulmonary nodule, no routine follow-up is needed if the patient is low risk. If the patient is high risk, optional follow-up chest CT at 12    months can be considered. Workstation performed: [id number]               Procedures  Procedures      ED Course  ED Course as of 11/29/23 1218   Tue Nov 28, 2023   0704 Blood Pressure(!): 181/109   0704 Temperature: 97.7 °F (36.5 °C)   0704 Temp Source: Tympanic   0704 Pulse: 57   0704 Respirations: 20   0704 SpO2: 100 %   0723 - given the presentation, will check CBC for marked leukocytosis  - CMP for liver enzyme elevation that could signal cholecystitis, biliary obstructive disease. Check RFTs for CHRISTIN / markers of dehydration.  - Lipase given abdominal pain to evaluate specifically for pancreatitis. - Lastly, will consider abdominal imaging.  - CT AP w Contrast: r/o appendicitis, cholecystitis, bowel obstruction or other acute abdominal pathology. Would also demonstrate signs of pyelonephritis, cystitis. - Disposition per workup.      0806 WBC(!): 11.13   0940 Creatinine(!): 0.41   0940 BUN: 18   0940 Lipase: 25   1037    1. Sigmoid diverticulosis. Questionable minimal acute inflammatory change occurring at the junction of the sigmoid colon and descending colon which may represent very mild acute diverticulitis. No extraluminal abscess, free fluid, or extraluminal air  2. No other acute inflammatory changes in the abdomen or pelvis  3. 0.3 cm right lower lobe pulmonary nodule. Based on current Fleischner Society 2017 Guidelines on incidental pulmonary nodule, no routine follow-up is needed if the patient is low risk. If the patient is high risk, optional follow-up chest CT at 12   months can be considered. - Patient feeling better after medications. Patient treated for diverticulitis. Advised to follow-up with primary care in a few days for reevaluation. Advised to continue antibiotics as instructed.   Patient is advised to come back to the hospital with any new worsening or concerning symptoms patient is aware he has no questions or concerns he stable for discharge. Medical Decision Making  Amount and/or Complexity of Data Reviewed  Labs: ordered. Decision-making details documented in ED Course. Radiology: ordered. Risk  Prescription drug management. Disposition  Final diagnoses:   Diverticulitis     Time reflects when diagnosis was documented in both MDM as applicable and the Disposition within this note       Time User Action Codes Description Comment    11/28/2023 10:47 AM Maxcine Medal Add [K57.92] Diverticulitis           ED Disposition       ED Disposition   Discharge    Condition   Stable    Date/Time   Tue Nov 28, 2023 10:37 AM    Comment   [address] [facility] discharge to home/self care.                    Follow-up Information       Follow up With Specialties Details Why Contact Info Additional Information    Catherine Clark MD Family Medicine Schedule an appointment as soon as possible for a visit  for follow up [facility] [phone]       [address] [place] [address] Emergency Department Emergency Medicine Go to  As needed, If symptoms worsen [address] [phone]  [facility], [address] [place] [address], [facility] Gastroenterology SPECIALISTS [facility] Gastroenterology   [address] [facility] [id number]  [address] [facility] Gastroenterology Specialists [facility], [address] [place] [address], [address] [place] [address], [address] [facility]            Discharge Medication List as of 11/28/2023 10:47 AM        START taking these medications    Details   amoxicillin-clavulanate (AUGMENTIN) 875-125 mg per tablet Take 1 tablet by mouth every 12 (twelve) hours for 10 days, Starting Tue 11/28/2023, Until Fri 12/8/2023, Normal           CONTINUE these medications which have NOT CHANGED    Details   latanoprost (XALATAN) 0.005 % ophthalmic solution INSTILL 1 DROP INTO BOTH EYES AT BEDTIME, STOCK SIZE 2.5ML, Historical Med      pantoprazole (PROTONIX) 40 mg tablet TAKE 1 TABLET BY MOUTH EVERY DAY, Normal      rosuvastatin (CRESTOR) 10 MG tablet TAKE 1 TABLET BY MOUTH EVERY DAY, Normal           No discharge procedures on file. PDMP Review       None             ED Provider  Attending physically available and evaluated Johnney Bumpers. I managed the patient along with the ED Attending.     Electronically Signed by           Nadja Loaiza DO  11/29/23 6212

## 2024-02-21 PROBLEM — Z00.00 WELL ADULT EXAM: Status: RESOLVED | Noted: 2019-02-26 | Resolved: 2024-02-21

## 2024-04-17 LAB
BUN SERPL-MCNC: 17 MG/DL (ref 7–25)
BUN/CREAT SERPL: NORMAL (CALC) (ref 6–22)
CALCIUM SERPL-MCNC: 9.5 MG/DL (ref 8.6–10.3)
CHLORIDE SERPL-SCNC: 105 MMOL/L (ref 98–110)
CHOLEST SERPL-MCNC: 156 MG/DL
CHOLEST/HDLC SERPL: 3.3 (CALC)
CO2 SERPL-SCNC: 29 MMOL/L (ref 20–32)
CREAT SERPL-MCNC: 0.9 MG/DL (ref 0.6–1.29)
ERYTHROCYTE [DISTWIDTH] IN BLOOD BY AUTOMATED COUNT: 12.4 % (ref 11–15)
GFR/BSA.PRED SERPLBLD CYS-BASED-ARV: 105 ML/MIN/1.73M2
GLUCOSE SERPL-MCNC: 94 MG/DL (ref 65–99)
HCT VFR BLD AUTO: 46.9 % (ref 38.5–50)
HDLC SERPL-MCNC: 48 MG/DL
HGB BLD-MCNC: 15.1 G/DL (ref 13.2–17.1)
LDLC SERPL CALC-MCNC: 82 MG/DL (CALC)
MCH RBC QN AUTO: 30.2 PG (ref 27–33)
MCHC RBC AUTO-ENTMCNC: 32.2 G/DL (ref 32–36)
MCV RBC AUTO: 93.8 FL (ref 80–100)
NONHDLC SERPL-MCNC: 108 MG/DL (CALC)
PLATELET # BLD AUTO: 253 THOUSAND/UL (ref 140–400)
PMV BLD REES-ECKER: 11.8 FL (ref 7.5–12.5)
POTASSIUM SERPL-SCNC: 4.4 MMOL/L (ref 3.5–5.3)
RBC # BLD AUTO: 5 MILLION/UL (ref 4.2–5.8)
SODIUM SERPL-SCNC: 140 MMOL/L (ref 135–146)
TRIGL SERPL-MCNC: 162 MG/DL
WBC # BLD AUTO: 7.1 THOUSAND/UL (ref 3.8–10.8)

## 2024-04-20 PROBLEM — B34.9 VIRAL ILLNESS: Status: RESOLVED | Noted: 2023-08-08 | Resolved: 2024-04-20

## 2024-04-23 ENCOUNTER — OFFICE VISIT (OUTPATIENT)
Dept: FAMILY MEDICINE CLINIC | Facility: CLINIC | Age: 49
End: 2024-04-23
Payer: COMMERCIAL

## 2024-04-23 VITALS
BODY MASS INDEX: 25.3 KG/M2 | HEART RATE: 70 BPM | DIASTOLIC BLOOD PRESSURE: 78 MMHG | RESPIRATION RATE: 18 BRPM | SYSTOLIC BLOOD PRESSURE: 122 MMHG | TEMPERATURE: 98.2 F | WEIGHT: 170.8 LBS | OXYGEN SATURATION: 98 % | HEIGHT: 69 IN

## 2024-04-23 DIAGNOSIS — E78.1 PURE HYPERTRIGLYCERIDEMIA: ICD-10-CM

## 2024-04-23 DIAGNOSIS — G47.00 INSOMNIA, UNSPECIFIED TYPE: ICD-10-CM

## 2024-04-23 DIAGNOSIS — F17.210 CIGARETTE NICOTINE DEPENDENCE WITHOUT COMPLICATION: ICD-10-CM

## 2024-04-23 DIAGNOSIS — K22.70 BARRETT'S ESOPHAGUS WITHOUT DYSPLASIA: ICD-10-CM

## 2024-04-23 DIAGNOSIS — K21.9 GERD WITHOUT ESOPHAGITIS: Primary | ICD-10-CM

## 2024-04-23 PROCEDURE — 99213 OFFICE O/P EST LOW 20 MIN: CPT | Performed by: FAMILY MEDICINE

## 2024-04-23 RX ORDER — CHOLECALCIFEROL (VITAMIN D3) 125 MCG
CAPSULE ORAL
Qty: 30 TABLET | Refills: 5 | Status: SHIPPED | OUTPATIENT
Start: 2024-04-23

## 2024-04-23 NOTE — ASSESSMENT & PLAN NOTE
Discussed sleep hygiene with patient.    Recommended to start Melatonin 5 mg 1 tablet at bedtime.    Call office if continues with difficulty sleeping at night.

## 2024-04-23 NOTE — ASSESSMENT & PLAN NOTE
Symptoms controlled on Pantoprazole 40 mg daily.    Follow-up with his gastroenterologist Dr. Moralez.

## 2024-04-23 NOTE — ASSESSMENT & PLAN NOTE
Patient cut down on alcohol.  Triglycerides improved.    Recommended to continue Rosuvastatin 10 mg daily.    Follow a low-cholesterol, low-fat diet, regular exercise.  Recheck lipid panel in 6 months.

## 2024-04-23 NOTE — PROGRESS NOTES
Name: Hardy Calvillo      : 1975      MRN: [id number]  Encounter Provider: Tiarra Garcia MD  Encounter Date: 2024   Encounter department: [facility]    Chief Complaint   Patient presents with    Follow-up     6 month      Health Maintenance   Topic Date Due    Colorectal Cancer Screening  Never done    Pneumococcal Vaccine: Pediatrics (0 to 5 Years) and At-Risk Patients (6 to 64 Years) (2 of 2 - PCV) 2022    COVID-19 Vaccine (1 - - season) Never done    Zoster Vaccine (1 of 2) 10/06/2024    Annual Physical  10/10/2024    Depression Screening  2025    DTaP,Tdap,and Td Vaccines (2 - Td or Tdap) 2026    HIV Screening  Completed    Hepatitis C Screening  Completed    Influenza Vaccine  Completed    HIB Vaccine  Aged Out    IPV Vaccine  Aged Out    Hepatitis A Vaccine  Aged Out    Meningococcal ACWY Vaccine  Aged Out    HPV Vaccine  Aged Out       Assessment & Plan     1. GERD without esophagitis  Assessment & Plan:  Symptoms controlled on Pantoprazole 40 mg daily.    Follow-up with his gastroenterologist Dr. Moralez.        2. Ho's esophagus without dysplasia  Assessment & Plan:  EGD done in May 2022 by gastroenterology Dr. Moralez who recommended to continue PPI therapy, schedule EGD in 3 years.        3. Cigarette nicotine dependence without complication  Assessment & Plan:  Counseled patient on smoking cessation.      4. Pure hypertriglyceridemia  Assessment & Plan:  Patient cut down on alcohol.  Triglycerides improved.    Recommended to continue Rosuvastatin 10 mg daily.    Follow a low-cholesterol, low-fat diet, regular exercise.  Recheck lipid panel in 6 months.    Orders:  -     Comprehensive metabolic panel; Future; Expected date: 10/12/2024  -     Lipid panel; Future; Expected date: 10/12/2024  -     Comprehensive metabolic panel; Future; Expected date: 10/16/2024  -     Lipid panel; Future; Expected date: 10/16/2024    5. Insomnia, unspecified  type  Assessment & Plan:  Discussed sleep hygiene with patient.    Recommended to start Melatonin 5 mg 1 tablet at bedtime.    Call office if continues with difficulty sleeping at night.    Orders:  -     Melatonin 5 MG TABS; Take 1 tab. at bedtime        Depression Screening and Follow-up Plan: Patient was screened for depression during today's encounter. They screened negative with a PHQ-2 score of 0.    Tobacco Cessation Counseling: Tobacco cessation counseling was provided. The patient is sincerely urged to quit consumption of tobacco. He is not ready to quit tobacco.       Schedule follow-up visit, physical exam in 6 months.  Check labs prior to next visit.      Subjective      HPI    Patient presents for 6-month follow-up visit.      Last seen by STEWART Mehta in October 2023.    PMHx: GERD, Ho's esophagus, Hyperlipidemia, seasonal allergies, chronic neck pain, cervical spinal stenosis.       Reviewed current medications, blood test results from April 16, 2024.      CBC - within normal range.    Fasting blood sugar 94, creatinine 0.90.    Cholesterol 156, HDL 48, LDL 82, triglycerides 162 ( improved from 325 in 3/2023)    Hyperlipidemia - improved since last year.    Patient cut down on alcohol.    Currently taking Rosuvastatin 10 mg daily.    Reports no chest pain, shortness of breath, dizziness.      Patient c/o trouble sleeping at night.    He goes to bed at 9:30 - 10 pm,  wakes up at 3 am.    GERD / Ho's esophagus  - followed by gastroenterology Dr. Moralez, had EGD done in 5/ 2022 which showed small hiatal hernia, Ho's esophagus.  Dr. Moralez recommended to repeat: EGD in 3 years.    Continues taking Pantoprazole 40 mg daily.    Patient continues to smoke cigarettes 3 packs per week.    Denies family history of colon cancer, prostate cancer.    C/o small sin tag on left upper eyelid.    Review of Systems   Constitutional:  Negative for activity change, appetite change, chills, fatigue and  "fever.   HENT:  Negative for congestion, sore throat and trouble swallowing.    Eyes:  Negative for pain, discharge, redness, itching and visual disturbance.   Respiratory:  Negative for cough and shortness of breath.    Cardiovascular:  Negative for chest pain, palpitations and leg swelling.   Gastrointestinal:  Negative for abdominal pain, blood in stool, constipation, diarrhea, nausea and vomiting.        Denies heartburn   Genitourinary:  Negative for difficulty urinating, dysuria and hematuria.   Musculoskeletal:  Positive for back pain (chronic) and neck pain (chronic). Negative for arthralgias, gait problem and joint swelling.   Skin:  Negative for rash.   Neurological:  Negative for dizziness, syncope and headaches.   Hematological: Negative.    Psychiatric/Behavioral:  Positive for sleep disturbance. Negative for dysphoric mood. The patient is not nervous/anxious.        Current Outpatient Medications on File Prior to Visit   Medication Sig    pantoprazole (PROTONIX) 40 mg tablet TAKE 1 TABLET BY MOUTH EVERY DAY    rosuvastatin (CRESTOR) 10 MG tablet TAKE 1 TABLET BY MOUTH EVERY DAY    latanoprost (XALATAN) 0.005 % ophthalmic solution INSTILL 1 DROP INTO BOTH EYES AT BEDTIME, STOCK SIZE 2.5ML (Patient not taking: Reported on 4/23/2024)       Objective     /78   Pulse 70   Temp 98.2 °F (36.8 °C) (Tympanic)   Resp 18   Ht 5' 9\" (1.753 m)   Wt 77.5 kg (170 lb 12.8 oz)   SpO2 98%   BMI 25.22 kg/m²     Physical Exam  Vitals and nursing note reviewed.   Constitutional:       Appearance: Normal appearance.   HENT:      Head: Normocephalic and atraumatic.   Eyes:      Conjunctiva/sclera: Conjunctivae normal.      Pupils: Pupils are equal, round, and reactive to light.   Neck:      Vascular: No carotid bruit.   Cardiovascular:      Rate and Rhythm: Normal rate and regular rhythm.      Heart sounds: No murmur heard.  Pulmonary:      Effort: Pulmonary effort is normal.      Breath sounds: Normal breath " sounds.   Abdominal:      General: Bowel sounds are normal. There is no distension.      Palpations: Abdomen is soft.      Tenderness: There is no abdominal tenderness.   Musculoskeletal:         General: No swelling. Normal range of motion.      Cervical back: Normal range of motion and neck supple.      Right lower leg: No edema.      Left lower leg: No edema.   Skin:     General: Skin is warm and dry.      Findings: No rash.      Comments: Small skin tag on left upper eyelid   Neurological:      Mental Status: He is alert.   Psychiatric:         Mood and Affect: Mood normal.       Tiarra Garcia MD

## 2024-04-23 NOTE — ASSESSMENT & PLAN NOTE
EGD done in May 2022 by gastroenterology Dr. Moralez who recommended to continue PPI therapy, schedule EGD in 3 years.

## 2024-04-28 DIAGNOSIS — K21.9 GASTROESOPHAGEAL REFLUX DISEASE WITHOUT ESOPHAGITIS: ICD-10-CM

## 2024-04-29 RX ORDER — PANTOPRAZOLE SODIUM 40 MG/1
TABLET, DELAYED RELEASE ORAL
Qty: 30 TABLET | Refills: 5 | Status: SHIPPED | OUTPATIENT
Start: 2024-04-29

## 2024-05-29 ENCOUNTER — TELEPHONE (OUTPATIENT)
Age: 49
End: 2024-05-29

## 2024-05-29 DIAGNOSIS — J30.9 ALLERGIC RHINITIS, UNSPECIFIED SEASONALITY, UNSPECIFIED TRIGGER: Primary | ICD-10-CM

## 2024-05-29 DIAGNOSIS — E78.2 MIXED HYPERLIPIDEMIA: ICD-10-CM

## 2024-05-29 RX ORDER — AZELASTINE 1 MG/ML
2 SPRAY, METERED NASAL 2 TIMES DAILY
Qty: 30 ML | Refills: 3 | Status: SHIPPED | OUTPATIENT
Start: 2024-05-29

## 2024-05-29 NOTE — TELEPHONE ENCOUNTER
Patient calling requesting new Rx: Flonase to help with allergies.    Please review and advise.  Thank You.

## 2024-05-29 NOTE — TELEPHONE ENCOUNTER
Patient requesting a refill of azelastine nasal spray 137 mcg/spray. This medication is not listed in his chart. Contacted the clinical team at [facility] and made a warm transfer of the call.

## 2024-05-30 RX ORDER — ROSUVASTATIN CALCIUM 10 MG/1
TABLET, COATED ORAL
Qty: 90 TABLET | Refills: 1 | Status: SHIPPED | OUTPATIENT
Start: 2024-05-30

## 2024-06-26 PROCEDURE — 88305 TISSUE EXAM BY PATHOLOGIST: CPT | Performed by: PATHOLOGY

## 2024-06-27 ENCOUNTER — LAB REQUISITION (OUTPATIENT)
Dept: LAB | Facility: HOSPITAL | Age: 49
End: 2024-06-27
Payer: COMMERCIAL

## 2024-06-27 DIAGNOSIS — D23.121 OTHER BENIGN NEOPLASM OF SKIN OF LEFT UPPER EYELID, INCLUDING CANTHUS: ICD-10-CM

## 2024-07-02 PROCEDURE — 88305 TISSUE EXAM BY PATHOLOGIST: CPT | Performed by: PATHOLOGY

## 2024-10-19 DIAGNOSIS — K21.9 GASTROESOPHAGEAL REFLUX DISEASE WITHOUT ESOPHAGITIS: ICD-10-CM

## 2024-10-20 RX ORDER — PANTOPRAZOLE SODIUM 40 MG/1
TABLET, DELAYED RELEASE ORAL
Qty: 30 TABLET | Refills: 5 | Status: SHIPPED | OUTPATIENT
Start: 2024-10-20

## 2024-10-29 ENCOUNTER — TELEPHONE (OUTPATIENT)
Dept: FAMILY MEDICINE CLINIC | Facility: CLINIC | Age: 49
End: 2024-10-29

## 2024-11-15 ENCOUNTER — TELEPHONE (OUTPATIENT)
Dept: FAMILY MEDICINE CLINIC | Facility: CLINIC | Age: 49
End: 2024-11-15

## 2024-11-15 NOTE — TELEPHONE ENCOUNTER
Left voicemail to reschedule appointment for 11/5/24 with Phyllis Wise as provider is unavailable. Please connect to office when call is returned so that we may assist in rescheduling.

## 2024-11-15 NOTE — TELEPHONE ENCOUNTER
Pt returning call; rescheduled for same day, different time w/ Ann Mehta.    Did not see office encounter come up until after ending call w/ pt; please review and contact pt back if not a good date/time to reschedule.

## 2024-12-05 ENCOUNTER — OFFICE VISIT (OUTPATIENT)
Dept: FAMILY MEDICINE CLINIC | Facility: CLINIC | Age: 49
End: 2024-12-05
Payer: COMMERCIAL

## 2024-12-05 VITALS
TEMPERATURE: 98.4 F | BODY MASS INDEX: 27.99 KG/M2 | WEIGHT: 189 LBS | OXYGEN SATURATION: 96 % | DIASTOLIC BLOOD PRESSURE: 84 MMHG | HEART RATE: 72 BPM | SYSTOLIC BLOOD PRESSURE: 122 MMHG | RESPIRATION RATE: 16 BRPM | HEIGHT: 69 IN

## 2024-12-05 DIAGNOSIS — K22.70 BARRETT'S ESOPHAGUS WITHOUT DYSPLASIA: ICD-10-CM

## 2024-12-05 DIAGNOSIS — K21.9 GERD WITHOUT ESOPHAGITIS: ICD-10-CM

## 2024-12-05 DIAGNOSIS — Z23 ENCOUNTER FOR IMMUNIZATION: ICD-10-CM

## 2024-12-05 DIAGNOSIS — F17.210 CIGARETTE NICOTINE DEPENDENCE WITHOUT COMPLICATION: ICD-10-CM

## 2024-12-05 DIAGNOSIS — E78.1 PURE HYPERTRIGLYCERIDEMIA: Primary | ICD-10-CM

## 2024-12-05 LAB
ALBUMIN SERPL-MCNC: 4.6 G/DL (ref 3.6–5.1)
ALBUMIN/GLOB SERPL: 2.1 (CALC) (ref 1–2.5)
ALP SERPL-CCNC: 82 U/L (ref 35–144)
ALT SERPL-CCNC: 24 U/L (ref 9–46)
AST SERPL-CCNC: 20 U/L (ref 10–35)
BILIRUB SERPL-MCNC: 0.7 MG/DL (ref 0.2–1.2)
BUN SERPL-MCNC: 17 MG/DL (ref 7–25)
BUN/CREAT SERPL: NORMAL (CALC) (ref 6–22)
CALCIUM SERPL-MCNC: 9.1 MG/DL (ref 8.6–10.3)
CHLORIDE SERPL-SCNC: 106 MMOL/L (ref 98–110)
CHOLEST SERPL-MCNC: 187 MG/DL
CHOLEST/HDLC SERPL: 4.1 (CALC)
CO2 SERPL-SCNC: 30 MMOL/L (ref 20–32)
CREAT SERPL-MCNC: 0.89 MG/DL (ref 0.7–1.3)
GFR/BSA.PRED SERPLBLD CYS-BASED-ARV: 104 ML/MIN/1.73M2
GLOBULIN SER CALC-MCNC: 2.2 G/DL (CALC) (ref 1.9–3.7)
GLUCOSE SERPL-MCNC: 95 MG/DL (ref 65–99)
HDLC SERPL-MCNC: 46 MG/DL
LDLC SERPL CALC-MCNC: 101 MG/DL (CALC)
NONHDLC SERPL-MCNC: 141 MG/DL (CALC)
POTASSIUM SERPL-SCNC: 4.4 MMOL/L (ref 3.5–5.3)
PROT SERPL-MCNC: 6.8 G/DL (ref 6.1–8.1)
SODIUM SERPL-SCNC: 142 MMOL/L (ref 135–146)
TRIGL SERPL-MCNC: 278 MG/DL

## 2024-12-05 PROCEDURE — 90673 RIV3 VACCINE NO PRESERV IM: CPT

## 2024-12-05 PROCEDURE — 99396 PREV VISIT EST AGE 40-64: CPT | Performed by: NURSE PRACTITIONER

## 2024-12-05 PROCEDURE — 90471 IMMUNIZATION ADMIN: CPT

## 2024-12-05 NOTE — ASSESSMENT & PLAN NOTE
Lipid panel reviewed with the patient.  He is eating a heavy carb diet.  I did recommend he cut back on his carbohydrates.  He has decreased his alcohol intake.  He will continue on Crestor.  Repeat lipid panel in 6 months.  Component      Latest Ref Rng 12/4/2024   Cholesterol      <200 mg/dL 187    HDL      > OR = 40 mg/dL 46    Triglycerides      <150 mg/dL 278 (H)    LDL Calculated      mg/dL (calc) 101 (H)    Chol/HDL Ratio      <5.0 (calc) 4.1    Non-HDL Cholesterol      <130 mg/dL (calc) 141 (H)       Legend:  (H) High    Orders:    Lipid Panel with Direct LDL reflex; Future

## 2024-12-05 NOTE — PROGRESS NOTES
Adult Annual Physical  Name: Hardy Calvillo      : 1975      MRN: [id number]  Encounter Provider: STEWART Mejias  Encounter Date: 2024   Encounter department: [facility]    Assessment & Plan  Pure hypertriglyceridemia    Lipid panel reviewed with the patient.  He is eating a heavy carb diet.  I did recommend he cut back on his carbohydrates.  He has decreased his alcohol intake.  He will continue on Crestor.  Repeat lipid panel in 6 months.  Component      Latest Ref Rng 2024   Cholesterol      <200 mg/dL 187    HDL      > OR = 40 mg/dL 46    Triglycerides      <150 mg/dL 278 (H)    LDL Calculated      mg/dL (calc) 101 (H)    Chol/HDL Ratio      <5.0 (calc) 4.1    Non-HDL Cholesterol      <130 mg/dL (calc) 141 (H)       Legend:  (H) High    Orders:    Lipid Panel with Direct LDL reflex; Future    Encounter for immunization    Orders:    influenza vaccine, recombinant, PF, 0.5 mL IM (Flublok)    Ho's esophagus without dysplasia  Patient's last EGD was performed in .  He will be due next year.  He does follow with Dr. Moralez who will be retiring.  He continues Protonix.  He has decreased his alcohol consumption.  He has decreased his smoking.         GERD without esophagitis  Symptoms controlled with Protonix.  Watch diet for triggers         Cigarette nicotine dependence without complication  Patient has cut back on his smoking cessation.  He did smoke 1 cigarette yesterday.  He has not today.  Continue with smoking cessation.         Immunizations and preventive care screenings were discussed with patient today. Appropriate education was printed on patient's after visit summary.        Counseling:  Alcohol/drug use: discussed moderation in alcohol intake, the recommendations for healthy alcohol use, and avoidance of illicit drug use.  Dental Health: discussed importance of regular tooth brushing, flossing, and dental visits.  Injury prevention: discussed  safety/seat belts, safety helmets, smoke detectors, carbon monoxide detectors, and smoking near bedding or upholstery.  Sexual health: discussed sexually transmitted diseases, partner selection, use of condoms, avoidance of unintended pregnancy, and contraceptive alternatives.  Exercise: the importance of regular exercise/physical activity was discussed. Recommend exercise 3-5 times per week for at least 30 minutes.       Depression Screening and Follow-up Plan: Patient was screened for depression during today's encounter. They screened negative with a PHQ-2 score of 0.    Tobacco Cessation Counseling: Tobacco cessation counseling was provided. The patient is sincerely urged to quit consumption of tobacco. He is not ready to quit tobacco.         History of Present Illness     Adult Annual Physical:  Patient presents for annual physical. 19 pound weight gain since April  Several drinks per week. Not daily  Not smoking daily .     Diet and Physical Activity:  - Diet/Nutrition: frequent junk food and adequate fiber intake. high carb diet, limited fruits and vegetables  - Exercise: no formal exercise. fed ex ,    Depression Screening:  - PHQ-2 Score: 0    General Health:  - Sleep: 7-8 hours of sleep on average and sleeps well.  - Hearing: normal hearing bilateral ears.  - Vision: wears glasses, goes for regular eye exams and most recent eye exam < 1 year ago.  - Dental: brushes teeth twice daily and no dental visits for > 1 year.    /GYN Health:    - History of STDs: no     Health:  - History of STDs: no.     Review of Systems   Constitutional: Negative.  Negative for fatigue.   HENT: Negative.  Negative for congestion, postnasal drip, rhinorrhea and trouble swallowing.    Eyes: Negative.  Negative for visual disturbance.   Respiratory: Negative.  Negative for choking and shortness of breath.    Cardiovascular: Negative.  Negative for chest pain.   Gastrointestinal: Negative.    Endocrine: Negative.   "  Genitourinary: Negative.    Musculoskeletal:  Positive for back pain. Negative for arthralgias, myalgias and neck pain.   Skin: Negative.    Neurological:  Negative for dizziness and headaches.   Psychiatric/Behavioral: Negative.       Current Outpatient Medications on File Prior to Visit   Medication Sig Dispense Refill    Melatonin 5 MG TABS Take 1 tab. at bedtime 30 tablet 5    pantoprazole (PROTONIX) 40 mg tablet TAKE 1 TABLET BY MOUTH EVERY DAY 30 tablet 5    rosuvastatin (CRESTOR) 10 MG tablet TAKE 1 TABLET BY MOUTH EVERY DAY 90 tablet 1    [DISCONTINUED] azelastine (ASTELIN) 0.1 % nasal spray 2 sprays into each nostril 2 (two) times a day Use in each nostril as directed (Patient not taking: Reported on 12/5/2024) 30 mL 3    [DISCONTINUED] latanoprost (XALATAN) 0.005 % ophthalmic solution INSTILL 1 DROP INTO BOTH EYES AT BEDTIME, STOCK SIZE 2.5ML (Patient not taking: Reported on 4/23/2024)  3     No current facility-administered medications on file prior to visit.      Social History     Tobacco Use    Smoking status: Some Days     Current packs/day: 0.25     Average packs/day: 0.3 packs/day for 30.0 years (7.5 ttl pk-yrs)     Types: Cigarettes     Passive exposure: Current    Smokeless tobacco: Never   Vaping Use    Vaping status: Never Used   Substance and Sexual Activity    Alcohol use: Yes     Alcohol/week: 2.0 standard drinks of alcohol     Comment: occasional    Drug use: Not Currently     Types: Marijuana, Other     Comment: medical marijuana    Sexual activity: Not Currently     Partners: Female       Objective   /84   Pulse 72   Temp 98.4 °F (36.9 °C) (Tympanic)   Resp 16   Ht 5' 9\" (1.753 m)   Wt 85.7 kg (189 lb)   SpO2 96%   BMI 27.91 kg/m²     Physical Exam  Vitals and nursing note reviewed.   Constitutional:       Appearance: Normal appearance. He is well-developed and normal weight.   HENT:      Head: Normocephalic and atraumatic.      Right Ear: Tympanic membrane, ear canal and " external ear normal. There is no impacted cerumen.      Left Ear: Tympanic membrane, ear canal and external ear normal. There is no impacted cerumen.      Nose: Nose normal.      Mouth/Throat:      Mouth: Mucous membranes are moist.      Pharynx: Oropharynx is clear.   Eyes:      Conjunctiva/sclera: Conjunctivae normal.   Cardiovascular:      Rate and Rhythm: Normal rate and regular rhythm.      Pulses: Normal pulses.      Heart sounds: Normal heart sounds. No murmur heard.  Pulmonary:      Effort: Pulmonary effort is normal. No respiratory distress.      Breath sounds: Normal breath sounds.   Abdominal:      General: Bowel sounds are normal. There is no distension.      Palpations: Abdomen is soft. There is no mass.      Tenderness: There is no abdominal tenderness. There is no guarding or rebound.      Hernia: No hernia is present.   Musculoskeletal:         General: Normal range of motion.      Cervical back: Normal range of motion and neck supple.   Skin:     General: Skin is warm and dry.   Neurological:      General: No focal deficit present.      Mental Status: He is alert and oriented to person, place, and time. Mental status is at baseline.   Psychiatric:         Mood and Affect: Mood normal.         Behavior: Behavior normal.         Thought Content: Thought content normal.         Judgment: Judgment normal.

## 2024-12-05 NOTE — ASSESSMENT & PLAN NOTE
Patient's last EGD was performed in 2022.  He will be due next year.  He does follow with Dr. Moralez who will be retiring.  He continues Protonix.  He has decreased his alcohol consumption.  He has decreased his smoking.

## 2024-12-05 NOTE — ASSESSMENT & PLAN NOTE
Patient has cut back on his smoking cessation.  He did smoke 1 cigarette yesterday.  He has not today.  Continue with smoking cessation.

## 2024-12-05 NOTE — PATIENT INSTRUCTIONS
"Patient Education     DASH diet   The Basics   Written by the doctors and editors at [facility]   What is the DASH diet? -- DASH stands for \"dietary approaches to stop hypertension.\" It is an eating plan that can help lower blood pressure. It can also help prevent high blood pressure, which doctors call \"hypertension.\" You don't need special foods or recipes to follow the DASH diet. It is more about eating certain types of foods in certain amounts.  The DASH diet has lots of fruits and vegetables, whole grains, lean meats, healthy fats, and low-fat or fat-free dairy products (figure 1). It is low in saturated fats, trans fats, cholesterol, added sugars, and sodium (salt).  The standard DASH diet limits sodium to no more than 2300 mg a day. Your doctor or nurse can talk to you about what your specific goals should be.  Why do I need the DASH diet? -- The DASH diet can help you:   Lower your blood pressure and cholesterol   Lower your risk for cancer, heart disease, heart attack, and stroke. It might also lower your risk for heart failure, kidney stones, and diabetes.   Lose weight or keep a healthy weight  What can I eat and drink on the DASH diet? -- Below are some guidelines and examples for your daily and weekly nutrition goals. These are based on a 2000-calorie-per-day eating plan.  Daily goals:   Grains - Try to eat 6 to 8 servings of whole-grain, high-fiber foods each day. Examples of a serving include 1 slice of bread, 1 ounce (30 grams) of dry cereal, or 1/2 cup (120 grams) of cooked cereal, pasta, or brown rice.   Fruits - Try to eat 4 to 5 servings of fruit each day. Examples of a serving include 1 medium fruit or 1/2 cup (75 grams) of fresh, frozen, or canned fruit. Try to eat different kinds and colors. Frozen or canned fruit should not have added sugar. Look for frozen or canned fruits with 100 percent fruit juice or water.   Vegetables - Try to eat 4 to 5 servings of vegetables each day. Examples of a " "serving include 1 cup (40 grams) of leafy greens or 1/2 cup (75 grams) of fresh or cooked vegetables. Try to pick many kinds and colors. If you buy canned vegetables, look for \"low sodium\" or \"salt free.\" Buy plain, frozen vegetables to avoid added fat and sodium.   Dairy - Try to eat 2 to 3 servings of fat-free or low-fat milk products each day. Examples of a serving include 1 cup (240 mL) of milk or yogurt or 1.5 ounces (45 grams) of cheese.   Lean meats, poultry, and seafood - Try to eat 6 or fewer servings of lean meat, poultry, and seafood each day. Examples of a serving include 1 egg or 1 ounce (30 grams) of cooked meat, poultry, or fish. Try to choose more low-fat or lean meats like chicken, fish, or turkey. Eat less red meat.   Fats and oils - Try to eat 2 to 3 servings of fats and oils each day. Examples of a serving include 1 teaspoon (5 mL) of soft margarine or vegetable oil, or 1 tablespoon (18 grams) of mayonnaise. Eat healthy fats like those found in fish, nuts, and avocados. Try using olive oil or vegetable oils such as canola oil. You can also try corn, safflower, sunflower, or soybean oils. Use low-sodium and low-fat salad dressing and mayonnaise.  Weekly goals:   Nuts, seeds, and legumes (dry beans and peas) - Try to eat 4 to 5 servings each week. Examples of a serving include 1/3 cup (45 grams) of nuts, 2 tablespoons (50 grams) of nut butter or seeds, or 1/2 cup (75 grams) of cooked legumes. Try almonds and walnuts, sunflower seeds, peanut or other nut butters, soybeans, lentils, kidney beans, and split peas.   Sweets - Try to eat fewer than 5 servings each week. Examples of a serving include 1 tablespoon (14 grams) of sugar or jelly, or 1/2 cup (120 grams) of gelatin. Choose low-fat and trans fat-free desserts. These include fruit-flavored gelatin, sorbet, jellybeans, tiana crackers, animal crackers, low-fat fig bars, and dwain snaps. Eat fruit to satisfy the desire for sweets.  To add flavor, " use pepper, herbs, spices, vinegar, or lemon or lime juices. Choose low-sodium or salt-free products whenever you can. This is especially important for foods like broths, soups, or soy sauce.  What foods and drinks should I avoid on the DASH diet?    Grains to avoid - Salted breads, rolls, crackers, quick breads, self-rising flours, biscuit mixes, regular breadcrumbs, instant hot cereals, commercially prepared rice, pasta, stuffing mixes.   Fruits and vegetables to avoid - Store-bought prepared potatoes and vegetable mixes, regular canned vegetables and juices, vegetables frozen with sauce, pickled vegetables, processed fruits with salt or sodium.   Dairy products to avoid - Whole milk, malted milk, chocolate milk, buttermilk, full-fat cheese, ice cream.   Meats to avoid - Smoked, cured, salted, or canned fish such as sardines or anchovies. High-fat cuts of meat like beef, lamb, pork, carnes and sausage, and chicken with the skin on it.   Fats and oils to avoid - Eat fewer solid fats like butter, lard, and hard stick margarine. Eat less saturated fat, trans fat, and total fat.   Condiments and snacks to avoid - Salted and canned peas, beans, and olives. Salted snack foods, fried foods, soda, other sweetened drinks.   Sweets to avoid - High-fat baked goods such as muffins, donuts, pastries, and commercial baked goods. Candy bars.   Alcohol - If you choose to drink alcohol, limit the amount. Most doctors recommend limiting alcohol to no more than 1 drink a day (for females) or 2 drinks a day (for males).  What else do I need to know?    Get regular physical activity to make this diet help you even more. Even gentle forms of activity, like walking, are good for your health.   Try baking or broiling instead of frying foods.   Write down the foods that you eat. This will help you track what you have eaten each week.   When you go to the grocery store, have a list or a meal plan. Don't shop when you are hungry, since this  might lead you to buy more unhealthy foods.   Read food labels with care (figure 2). They show you how much is in a serving. The amount is given as a percentage of the total amount that you need each day. Reading labels helps you make healthy food choices.  All topics are updated as new evidence becomes available and our peer review process is complete.  This topic retrieved from [employer] on: Feb 26, 2024.  Topic 117222 Version 1.0  Release: 32.2.4 - C32.56  © 2024 UpToDate, Inc. and/or its affiliates. All rights reserved.  figure 1: DASH diet     Graphic 179784 Version 1.0  figure 2: Food label     Graphic 996806 Version 1.0  Consumer Information Use and Disclaimer   Disclaimer: This generalized information is a limited summary of diagnosis, treatment, and/or medication information. It is not meant to be comprehensive and should be used as a tool to help the user understand and/or assess potential diagnostic and treatment options. It does NOT include all information about conditions, treatments, medications, side effects, or risks that may apply to a specific patient. It is not intended to be medical advice or a substitute for the medical advice, diagnosis, or treatment of a health care provider based on the health care provider's examination and assessment of a patient's specific and unique circumstances. Patients must speak with a health care provider for complete information about their health, medical questions, and treatment options, including any risks or benefits regarding use of medications. This information does not endorse any treatments or medications as safe, effective, or approved for treating a specific patient. UpToDate, Inc. and its affiliates disclaim any warranty or liability relating to this information or the use thereof.The use of this information is governed by the Terms of Use, available at https://www.[employer].com/en/know/clinical-effectiveness-terms. 2024© UpToDate, Inc. and its  affiliates and/or licensors. All rights reserved.  Copyright   © 2024 [employer] and/or its affiliates. All rights reserved.

## 2024-12-10 ENCOUNTER — TELEPHONE (OUTPATIENT)
Dept: ADMINISTRATIVE | Facility: OTHER | Age: 49
End: 2024-12-10

## 2024-12-10 NOTE — TELEPHONE ENCOUNTER
----- Message from Shira GALINDO sent at 12/9/2024  9:31 AM EST -----  Regarding: care gap request  12/09/24 9:31 AM    Hello, our patient attached above has had CRC: Colonoscopy completed/performed. Please assist in updating the patient chart by pulling the document from the Media Tab. The date of service is 11/01/2023.     Thank you,  Shira Lauren  [facility]       11/1/2023-colonoscopy performed by Dr. Moralez. Pathology report under media. Please contact Dr. Moralez's office for full colonoscopy report and recommended follow up.

## 2024-12-10 NOTE — LETTER
Procedure Request Form: Colonoscopy  OP Report/Pathology Please we have a letter only!      Date Requested: 12/10/24  Patient: Hardy Calvillo  Patient : 1975   Referring Provider: Tiarra Garcia MD        Date of Procedure _____22-7-28_________________________       The above patient has informed us that they have completed their   most recent Colonoscopy at your facility. Please complete   this form and attach all corresponding procedure reports/results.    Comments __________________________________________________________  ____________________________________________________________________  ____________________________________________________________________  ____________________________________________________________________    Facility Completing Procedure _________________________________________    Form Completed By (print name) _______________________________________      Signature __________________________________________________________      These reports are needed for  compliance.    Please fax this completed form and a copy of the procedure report to our office located at [address] [place] [address] as soon as possible to Fax [phone] jase Reveles: Phone [phone]    We thank you for your assistance in treating our mutual patient.

## 2024-12-12 NOTE — TELEPHONE ENCOUNTER
Upon review of the In Basket request we were able to locate, review, and update the patient chart as requested for CRC: Colonoscopy.    Any additional questions or concerns should be emailed to the Practice Liaisons via the appropriate education email address, please do not reply via In Basket.    Thank you  Anushka Power MA   [facility]

## 2025-02-04 ENCOUNTER — TELEPHONE (OUTPATIENT)
Age: 50
End: 2025-02-04

## 2025-02-04 NOTE — TELEPHONE ENCOUNTER
Caller: Hardy     Doctor: Dr. TRIMBLE    Reason for call: pt asked if we can mail him a letter advising of what his diagnosis has been under the care of dr grady for insurance coverage purposes    Call back#: [phone]

## 2025-02-05 NOTE — TELEPHONE ENCOUNTER
All of his diagnoses are attached to his office notes.  Can he just obtain his office notes or at least his after visit summaries which have the information he is requesting?

## 2025-03-04 ENCOUNTER — OFFICE VISIT (OUTPATIENT)
Dept: PAIN MEDICINE | Facility: CLINIC | Age: 50
End: 2025-03-04
Payer: COMMERCIAL

## 2025-03-04 VITALS — HEIGHT: 69 IN | WEIGHT: 180 LBS | BODY MASS INDEX: 26.66 KG/M2

## 2025-03-04 DIAGNOSIS — M54.9 MID BACK PAIN: ICD-10-CM

## 2025-03-04 DIAGNOSIS — M54.2 NECK PAIN: ICD-10-CM

## 2025-03-04 DIAGNOSIS — M79.18 MYOFASCIAL PAIN SYNDROME: Primary | ICD-10-CM

## 2025-03-04 DIAGNOSIS — M47.812 CERVICAL SPONDYLOSIS: ICD-10-CM

## 2025-03-04 PROCEDURE — 99214 OFFICE O/P EST MOD 30 MIN: CPT | Performed by: NURSE PRACTITIONER

## 2025-03-04 RX ORDER — METHOCARBAMOL 750 MG/1
750 TABLET, FILM COATED ORAL EVERY 8 HOURS PRN
Qty: 90 TABLET | Refills: 1 | Status: SHIPPED | OUTPATIENT
Start: 2025-03-04

## 2025-03-04 NOTE — PROGRESS NOTES
Assessment:  1. Myofascial pain syndrome    2. Cervical spondylosis    3. Neck pain    4. Mid back pain        Plan:  Patient will be scheduled for repeat right C4-6 RFA.     The patient has been experiencing moderate to severe axial spine pain that is causing functional deficit.  The pain has been present for at least 3 months and is not improving with conservative care.  Currently the patient is not experiencing any radicular features nor neurogenic claudication.  Non-facet pathology has been ruled out on clinical evaluation. We will schedule the patient for left C4-6 medial branch blocks with intention of moving forward towards radiofrequency ablation if there is an appropriate diagnostic response. The initial blocks will be performed with 2% lidocaine and if an appropriate response is obtained upon review of the patient's pain diary, a confirmatory block will be scheduled.  I will prescribe methocarbamol 750 mg as needed myofascial pain.  I advised the patient that they should not drive or operate machinery while on this medication until they see how it affects them, as it could cause lethargy and mental cloudyness. I advised the patient to call our office if they experience any side effects or issues with the medication changes. The patient verbalized an understanding.  Physical therapy referral provided to patient today  Patient may continue Tylenol as needed  Will avoid NSAIDs secondary to history of GI upset  Follow-up after procedure or sooner if needed    History of Present Illness:    The patient is a 49 y.o. male last seen on 5/10/2023 who presents for a follow up office visit in regards to chronic neck pain that radiates into the scapular region.  He denies any upper extremity symptoms, bowel or bladder incontinence or balance issues.  Patient states his pain has intensified over the last month without inciting event or trauma.  He has not done any recent formal physical therapy or chiropractic therapy  for his current complaint.  He has been using Tylenol without much relief.  He is unable to take NSAIDs secondary to GI upset.  Patient did have a right C4-6 RFA completed in 2019 which resolved his right sided neck pain up until recently.  He has  also had cervical epidural steroid injection which resolved his radicular complaints into the left upper extremity at the time.    The patient rates his pain an 8 out of 10 on the numeric pain rating scale.  Pain is constant described as sharp, cramping and shooting    I have personally reviewed and/or updated the patient's past medical history, past surgical history, family history, social history, current medications, allergies, and vital signs today.       Review of Systems:    Review of Systems   Respiratory:  Negative for shortness of breath.    Cardiovascular:  Negative for chest pain.   Gastrointestinal:  Negative for constipation, diarrhea, nausea and vomiting.   Musculoskeletal:  Negative for arthralgias, gait problem, joint swelling and myalgias.   Skin:  Negative for rash.   Neurological:  Negative for dizziness, seizures and weakness.   All other systems reviewed and are negative.        Past Medical History:   Diagnosis Date    Allergic     Ho's esophagus     Cervical disc disorder     Diverticulitis of colon     Eczema     GERD (gastroesophageal reflux disease)     Hyperlipidemia     Migraine     Streptococcal pharyngitis        Past Surgical History:   Procedure Laterality Date    APPENDECTOMY      US GUIDED MSK PROCEDURE  7/31/2020       Family History   Problem Relation Age of Onset    Hyperlipidemia Mother     Sleep apnea Brother     Diverticulitis Brother        Social History     Occupational History    Not on file   Tobacco Use    Smoking status: Some Days     Current packs/day: 0.25     Average packs/day: 0.3 packs/day for 30.0 years (7.5 ttl pk-yrs)     Types: Cigarettes     Passive exposure: Current    Smokeless tobacco: Never   Vaping Use     "Vaping status: Never Used   Substance and Sexual Activity    Alcohol use: Yes     Alcohol/week: 2.0 standard drinks of alcohol     Comment: occasional    Drug use: Not Currently     Types: Marijuana, Other     Comment: medical marijuana    Sexual activity: Not Currently     Partners: Female         Current Outpatient Medications:     Melatonin 5 MG TABS, Take 1 tab. at bedtime, Disp: 30 tablet, Rfl: 5    methocarbamol (Robaxin-750) 750 mg tablet, Take 1 tablet (750 mg total) by mouth every 8 (eight) hours as needed for muscle spasms, Disp: 90 tablet, Rfl: 1    pantoprazole (PROTONIX) 40 mg tablet, TAKE 1 TABLET BY MOUTH EVERY DAY, Disp: 30 tablet, Rfl: 5    rosuvastatin (CRESTOR) 10 MG tablet, TAKE 1 TABLET BY MOUTH EVERY DAY, Disp: 90 tablet, Rfl: 1    No Known Allergies    Physical Exam:    Ht 5' 9\" (1.753 m)   Wt 81.6 kg (180 lb)   BMI 26.58 kg/m²     Constitutional:normal, well developed, well nourished, alert, in no distress and non-toxic and no overt pain behavior.  Eyes:anicteric  HEENT:grossly intact  Neck:supple, symmetric, trachea midline and no masses   Pulmonary:even and unlabored  Cardiovascular:No edema or pitting edema present  Skin:Normal without rashes or lesions and well hydrated  Psychiatric:Mood and affect appropriate  Neurologic:Cranial Nerves II-XII grossly intact  Musculoskeletal:normal gait.  Bilateral cervical paraspinal and trapezii musculature tender to palpation.  Limited range of motion in all planes of the cervical spine.  Bilateral upper extremity strength 5 out of 5 all groups.  Sensation intact to light touch in C5-T1 dermatomes bilaterally.  Negative Waters's reflex.  Negative Spurling's.  Positive facet loading maneuvers      Imaging  FL spine and pain procedure    (Results Pending)   FL spine and pain procedure    (Results Pending)         Orders Placed This Encounter   Procedures    FL spine and pain procedure    FL spine and pain procedure    Ambulatory referral to Physical " Therapy

## 2025-03-12 ENCOUNTER — EVALUATION (OUTPATIENT)
Dept: PHYSICAL THERAPY | Age: 50
End: 2025-03-12
Payer: COMMERCIAL

## 2025-03-12 DIAGNOSIS — M79.18 MYOFASCIAL PAIN SYNDROME: Primary | ICD-10-CM

## 2025-03-12 PROCEDURE — 97110 THERAPEUTIC EXERCISES: CPT

## 2025-03-12 PROCEDURE — 97161 PT EVAL LOW COMPLEX 20 MIN: CPT

## 2025-03-12 NOTE — PROGRESS NOTES
PT Evaluation     Today's date: 3/12/2025  Patient name: Hardy Calvillo  : 1975  MRN: [id number]  Referring provider: Aurelia Weller CRNP  Dx: No diagnosis found.               Assessment  Impairments: abnormal or restricted ROM, impaired physical strength, lacks appropriate home exercise program, pain with function and poor posture   Symptom irritability: moderate    Assessment details: Pt is a 49 y.o. male who presents to IE with chief c/o neck pain. Signs and symptoms confirm referring dx of myofascial pain syndrome and acute on chronic neck apin. he has primary impairments of decreased cervical ROM, decreased thoracic mobility, decreased b/l shoulder ROM, and increased pain. 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 Had pt perform postural re-correction and thoracic mobility exercises which he tolerated well and noted a slight improvement in symptoms. Provided pt with HEP handout of these exercises and educate pt on proper completion of HEP, normal response to exercises, diagnosis, and POC. he verbalizes understanding of all education provided. All questions answered. Pt would benefit from skilled OP PT in order to improve upon impairments and return to PLOF.   Understanding of Dx/Px/POC: good     Prognosis: good    Goals  STG  Pt will be able to reach overhead with a 50% reduction in symptoms within 2-3 weeks  Pt will be able perform grooming ADL's with a 50% reduction in symptoms within 2-3 weeks    LTG  Pt will be independent with HEP by d/c  Pt will improve FOTO by ELLEN by d/c  Pt will be able to tolerate normal leisure/recreational activities by d/c  Pt will be able to lift 8-10 lb container to overhead cabinet by d/c         Plan    Planned therapy interventions: patient education, therapeutic  exercise, graded exercise, functional ROM exercises, flexibility, home exercise program, manual therapy, activity modification, strengthening, stretching, joint mobilization, massage, therapeutic activities, IASTM, neuromuscular re-education and patient/caregiver education    Frequency: 1-2x week  Duration in weeks: 4  Treatment plan discussed with: patient        Subjective Evaluation    History of Present Illness  Mechanism of injury: Pt reports that he has had PT before and he had an RFA which helped significantly. Flared up 3-4 weeks ago. 2 weeks ago he tried to go up the stairs, could lift his leg up because of the back pain that radiate into his neck. Pt reports that symptoms are in neck and midback and off to the R side and down past mid back. Mostly in neck and b/l shoulder area. Denies n/t and weakness in his arms. When he originally had this he did have those symptoms in his LUE. He has difficulty getting into a comfortable position. Wakes up 2-3 times during the night. Denies increased frequency in headaches. Denies dizziness. Tends to get worse throughout the day. Typically worse after work. He drives for [employer]    Agg factors: sitting in the car (any kind of bumps and compression on the spine), lifting, carrying, reaching.           Recurrent probem    Quality of life: good    Patient Goals  Patient goals for therapy: decreased pain    Pain  Current pain ratin  At best pain ratin  At worst pain ratin  Quality: sharp, radiating and discomfort  Relieving factors: medications and rest  Aggravating factors: sitting    Social Support    Employment status: working    Diagnostic Tests  Abnormal MRI: Most recent was .  Treatments  Previous treatment: injection treatment and physical therapy  Current treatment: injection treatment and physical therapy      Objective     Active Range of Motion   Cervical/Thoracic Spine       Cervical    Flexion: 35 degrees  with pain  Extension: 40 degrees     with  pain  Left lateral flexion: 25 degrees     with pain  Right lateral flexion: 28 degrees     with pain  Left rotation: 60 degrees with pain  Right rotation: 60 degrees    with pain    Thoracic    Flexion:  Restriction level: moderate  Extension:  Restriction level: moderate  Left lateral flexion:  Restriction level: moderate  Right lateral flexion:  Restriction level: moderate  Left rotation:  Restriction level: moderate  Right rotation:  Restriction level: moderate  Left Shoulder   Flexion: WFL and with pain  Abduction: 100 degrees with pain  External rotation BTH: C2 with pain  Internal rotation BTB: T11 with pain    Right Shoulder   Flexion: WFL and with pain  Abduction: 100 degrees with pain  External rotation BTH: C2 with pain  Internal rotation BTB: T11 with pain    Joint Play     Comments: Hypomobile t/o cervical and thoracic spine. Spring testing of T4-6 reproduced symptoms into neck.     Strength/Myotome Testing     Left Shoulder     Planes of Motion   Flexion: 4   Abduction: 4   External rotation at 0°: 4   Internal rotation at 0°: 4     Right Shoulder     Planes of Motion   Flexion: 4   Abduction: 4   External rotation at 0°: 4   Internal rotation at 0°: 4     Left Elbow   Flexion: 4  Extension: 4    Right Elbow   Flexion: 4  Extension: 4    Left Wrist/Hand   Wrist extension: WFL  Wrist flexion: WFL  Thumb extension: WFL    Right Wrist/Hand   Wrist extension: WFL  Wrist flexion: WFL  Thumb extension: WFL    Tests   Cervical   Positive vertical compression.  Negative cervical distraction.     Left   Negative Spurling's Test A.     Right   Negative Spurling's Test A.     Lumbar   Positive vertical compression .              Precautions: Chronic neck pain      Manuals 3/12            Cervical/thoracic CPA             SOR                                       Neuro Re-Ed                                                                                                        Ther Ex             Corner stretch  "30\"x3            Seated thoracic ext x15            Scap retractions x10            Open books x10            Cat/camel             Prone shoulder ext w/ scap ret             Rows/ext caty             ER and horz abd w/ TB                                                                              Ther Activity                                       Gait Training                                       Modalities                                            "

## 2025-03-18 ENCOUNTER — HOSPITAL ENCOUNTER (OUTPATIENT)
Dept: RADIOLOGY | Facility: CLINIC | Age: 50
Discharge: HOME | End: 2025-03-18
Payer: COMMERCIAL

## 2025-03-18 ENCOUNTER — TELEPHONE (OUTPATIENT)
Dept: PAIN MEDICINE | Facility: CLINIC | Age: 50
End: 2025-03-18

## 2025-03-18 VITALS
TEMPERATURE: 97.5 F | OXYGEN SATURATION: 100 % | HEART RATE: 58 BPM | SYSTOLIC BLOOD PRESSURE: 174 MMHG | RESPIRATION RATE: 18 BRPM | DIASTOLIC BLOOD PRESSURE: 119 MMHG

## 2025-03-18 DIAGNOSIS — M47.812 CERVICAL SPONDYLOSIS: ICD-10-CM

## 2025-03-18 PROCEDURE — 64634 DESTROY C/TH FACET JNT ADDL: CPT | Performed by: ANESTHESIOLOGY

## 2025-03-18 PROCEDURE — 64633 DESTROY CERV/THOR FACET JNT: CPT | Performed by: ANESTHESIOLOGY

## 2025-03-18 RX ORDER — BUPIVACAINE HYDROCHLORIDE 5 MG/ML
3 INJECTION, SOLUTION EPIDURAL; INTRACAUDAL; PERINEURAL ONCE
Status: COMPLETED | OUTPATIENT
Start: 2025-03-18 | End: 2025-03-18

## 2025-03-18 RX ORDER — LIDOCAINE HYDROCHLORIDE 10 MG/ML
8 INJECTION, SOLUTION EPIDURAL; INFILTRATION; INTRACAUDAL; PERINEURAL ONCE
Status: COMPLETED | OUTPATIENT
Start: 2025-03-18 | End: 2025-03-18

## 2025-03-18 RX ADMIN — BUPIVACAINE HYDROCHLORIDE 3 ML: 5 INJECTION, SOLUTION EPIDURAL; INTRACAUDAL; PERINEURAL at 08:45

## 2025-03-18 RX ADMIN — LIDOCAINE HYDROCHLORIDE 8 ML: 10 INJECTION, SOLUTION EPIDURAL; INFILTRATION; INTRACAUDAL; PERINEURAL at 08:41

## 2025-03-18 RX ADMIN — LIDOCAINE HYDROCHLORIDE 3 ML: 20 INJECTION, SOLUTION EPIDURAL; INFILTRATION; INTRACAUDAL at 08:45

## 2025-03-18 NOTE — H&P
History of Present Illness: The patient is a 49 y.o. male who presents with complaints of neck pain.    Past Medical History:   Diagnosis Date    Allergic     Ho's esophagus     Cervical disc disorder     Diverticulitis of colon     Eczema     GERD (gastroesophageal reflux disease)     Hyperlipidemia     Migraine     Streptococcal pharyngitis        Past Surgical History:   Procedure Laterality Date    APPENDECTOMY      US GUIDED MSK PROCEDURE  7/31/2020         Current Outpatient Medications:     Melatonin 5 MG TABS, Take 1 tab. at bedtime, Disp: 30 tablet, Rfl: 5    methocarbamol (Robaxin-750) 750 mg tablet, Take 1 tablet (750 mg total) by mouth every 8 (eight) hours as needed for muscle spasms, Disp: 90 tablet, Rfl: 1    pantoprazole (PROTONIX) 40 mg tablet, TAKE 1 TABLET BY MOUTH EVERY DAY, Disp: 30 tablet, Rfl: 5    rosuvastatin (CRESTOR) 10 MG tablet, TAKE 1 TABLET BY MOUTH EVERY DAY, Disp: 90 tablet, Rfl: 1    No Known Allergies    Physical Exam:   Vitals:    03/18/25 0820   BP: 141/93   Pulse: 56   Resp: 20   Temp: 97.5 °F (36.4 °C)   SpO2: 99%     General: Awake, Alert, Oriented x 3, Mood and affect appropriate  Respiratory: Respirations even and unlabored  Cardiovascular: Peripheral pulses intact; no edema  Musculoskeletal Exam: normal gait    ASA Score: 2    Patient/Chart Verification  Patient ID Verified: Verbal  ID Band Applied: No  Consents Confirmed: To be obtained in the Procedural area  H&P( within 30 days) Verified: To be obtained in the Procedural area  Interval H&P(within 24 hr) Complete (required for Outpatients and Surgery Admit only): To be obtained in the Procedural area  Allergies Reviewed: Yes  Anticoag/NSAID held?: NA  Currently on antibiotics?: No    Assessment:   1. Cervical spondylosis        Plan: Left C4-6 RFA (repeat)

## 2025-03-18 NOTE — DISCHARGE INSTR - LAB

## 2025-03-19 NOTE — TELEPHONE ENCOUNTER
Caller: Patient     Doctor: Dr. King     Reason for call: Returning call from nurse     Please assist     Call back#: [phone]

## 2025-03-19 NOTE — TELEPHONE ENCOUNTER
S/w pt who reports that he had pain yesterday that was relieved by taking a muscle relaxant.Pain level 0/10. Pt aware that he can take routine pain meds or use ice and or heat 20 mins off and on. Pt aware that it may take 4-6 weeks for the full effect of the procedure. Pt denies fever, sunburn like sensation or signs of infection.    Confirmed next procedure

## 2025-03-21 NOTE — TELEPHONE ENCOUNTER
Caller: Patient    Doctor: Dr. TRIMBEL    Reason for call: Patient  is still feeling numbness on shoulder and neck, would like to know if that's a normal side effect. Procedure was on 3/18        Call back#:

## 2025-03-21 NOTE — TELEPHONE ENCOUNTER
S/w the patient and reviewed that it could take some time for the numbness to go away because of the local used at the time of the ablation. He is able to use his UE's. He stated it feel numb at the site and around that area. Reviewed that it is normal. He denies a sunburn sensation. He appreciated the call.

## 2025-04-01 ENCOUNTER — TELEPHONE (OUTPATIENT)
Dept: PAIN MEDICINE | Facility: CLINIC | Age: 50
End: 2025-04-01

## 2025-04-01 ENCOUNTER — HOSPITAL ENCOUNTER (OUTPATIENT)
Dept: RADIOLOGY | Facility: CLINIC | Age: 50
Discharge: HOME | End: 2025-04-01
Payer: COMMERCIAL

## 2025-04-01 VITALS
HEART RATE: 65 BPM | OXYGEN SATURATION: 98 % | SYSTOLIC BLOOD PRESSURE: 153 MMHG | DIASTOLIC BLOOD PRESSURE: 99 MMHG | TEMPERATURE: 97.7 F | RESPIRATION RATE: 20 BRPM

## 2025-04-01 DIAGNOSIS — M47.812 CERVICAL SPONDYLOSIS: ICD-10-CM

## 2025-04-01 PROCEDURE — 64633 DESTROY CERV/THOR FACET JNT: CPT | Performed by: ANESTHESIOLOGY

## 2025-04-01 PROCEDURE — 64634 DESTROY C/TH FACET JNT ADDL: CPT | Performed by: ANESTHESIOLOGY

## 2025-04-01 RX ORDER — BUPIVACAINE HYDROCHLORIDE 5 MG/ML
3 INJECTION, SOLUTION EPIDURAL; INTRACAUDAL; PERINEURAL ONCE
Status: COMPLETED | OUTPATIENT
Start: 2025-04-01 | End: 2025-04-01

## 2025-04-01 RX ORDER — LIDOCAINE HYDROCHLORIDE 10 MG/ML
7 INJECTION, SOLUTION EPIDURAL; INFILTRATION; INTRACAUDAL; PERINEURAL ONCE
Status: COMPLETED | OUTPATIENT
Start: 2025-04-01 | End: 2025-04-01

## 2025-04-01 RX ADMIN — BUPIVACAINE HYDROCHLORIDE 3 ML: 5 INJECTION, SOLUTION EPIDURAL; INTRACAUDAL; PERINEURAL at 08:49

## 2025-04-01 RX ADMIN — LIDOCAINE HYDROCHLORIDE 7 ML: 10 INJECTION, SOLUTION EPIDURAL; INFILTRATION; INTRACAUDAL; PERINEURAL at 08:45

## 2025-04-01 RX ADMIN — LIDOCAINE HYDROCHLORIDE 3 ML: 20 INJECTION, SOLUTION EPIDURAL; INFILTRATION; INTRACAUDAL at 08:49

## 2025-04-01 NOTE — TELEPHONE ENCOUNTER
Patient is S/P a Right C4-C6 RFA c/ JW on 4/1/25.  He will need a 6 week F/U to be scheduled.  Please call on 4/2/25 post RFA. Thanks

## 2025-04-01 NOTE — H&P
History of Present Illness: The patient is a 49 y.o. male who presents with complaints of neck pain.    Past Medical History:   Diagnosis Date    Allergic     Ho's esophagus     Cervical disc disorder     Diverticulitis of colon     Eczema     GERD (gastroesophageal reflux disease)     Hyperlipidemia     Migraine     Streptococcal pharyngitis        Past Surgical History:   Procedure Laterality Date    APPENDECTOMY      US GUIDED MSK PROCEDURE  7/31/2020         Current Outpatient Medications:     Melatonin 5 MG TABS, Take 1 tab. at bedtime, Disp: 30 tablet, Rfl: 5    methocarbamol (Robaxin-750) 750 mg tablet, Take 1 tablet (750 mg total) by mouth every 8 (eight) hours as needed for muscle spasms, Disp: 90 tablet, Rfl: 1    pantoprazole (PROTONIX) 40 mg tablet, TAKE 1 TABLET BY MOUTH EVERY DAY, Disp: 30 tablet, Rfl: 5    rosuvastatin (CRESTOR) 10 MG tablet, TAKE 1 TABLET BY MOUTH EVERY DAY, Disp: 90 tablet, Rfl: 1    No Known Allergies    Physical Exam:   Vitals:    04/01/25 0815   BP: 130/90   Pulse: 67   Resp: 18   Temp: 97.7 °F (36.5 °C)   SpO2: 98%     General: Awake, Alert, Oriented x 3, Mood and affect appropriate  Respiratory: Respirations even and unlabored  Cardiovascular: Peripheral pulses intact; no edema  Musculoskeletal Exam: normal gait    ASA Score: 2         Assessment:   1. Cervical spondylosis        Plan: Repeat right C4-6 RFA

## 2025-04-08 NOTE — TELEPHONE ENCOUNTER
Caller: marcial Dash    Doctor: Dr. King    Reason for call: pt returning the nurse's call.  Pt asked if someone can call him back on Thursday 4/10 as he will be off of work that day    Call back#: [phone]

## 2025-04-08 NOTE — TELEPHONE ENCOUNTER
Caller: Patient     Doctor: Dr. King     Reason for call: Patient returning call, advised he was in a bit of pain last week. Did a bit of heavy lifting yesterday / last night and today is in a lot of pain.     Please advise     6 week f/u scheduled     Call back#: [phone]

## 2025-04-11 NOTE — TELEPHONE ENCOUNTER
S/w pt who reports good days and bad days since procedure. Pt aware it can take 4-6 weeks for the full effect of the procedure. Pt is using ice for pain relief and is aware he can use heat and routine pain meds, if needed. Pt denies signs of infection, fever or sunburn like sensation.     Confirmed f/u appt.

## 2025-04-11 NOTE — TELEPHONE ENCOUNTER
Caller: Hardy    Doctor: Dr. King    Reason for call: returning nurses phone call he has flare ups from time to time pain gets bad he uses ice he is scheduled for next office visit on 5/9    Call back#: [phone]

## 2025-04-30 DIAGNOSIS — K21.9 GASTROESOPHAGEAL REFLUX DISEASE WITHOUT ESOPHAGITIS: ICD-10-CM

## 2025-04-30 RX ORDER — PANTOPRAZOLE SODIUM 40 MG/1
40 TABLET, DELAYED RELEASE ORAL DAILY
Qty: 30 TABLET | Refills: 5 | Status: SHIPPED | OUTPATIENT
Start: 2025-04-30

## 2025-06-03 NOTE — ASSESSMENT & PLAN NOTE
Component      Latest Ref Rng 12/4/2024   Cholesterol      <200 mg/dL 187    HDL      > OR = 40 mg/dL 46    Triglycerides      <150 mg/dL 278 (H)    LDL Calculated      mg/dL (calc) 101 (H)    Chol/HDL Ratio      <5.0 (calc) 4.1    Non-HDL Cholesterol      <130 mg/dL (calc) 141 (H)       Legend:  (H) High    The 10-year ASCVD risk score (Wendy OLMEDO, et al., 2019) is: 7.4%    Values used to calculate the score:      Age: 49 years      Clincally relevant sex: Male      Is Non- : No      Diabetic: No      Tobacco smoker: Yes      Systolic Blood Pressure: 126 mmHg      Is BP treated: No      HDL Cholesterol: 46 mg/dL      Total Cholesterol: 187 mg/dL

## 2025-06-03 NOTE — PROGRESS NOTES
Name: Hardy Calvillo      : 1975      MRN: [id number]  Encounter Provider: STEWART Mejias  Encounter Date: 2025   Encounter department: [facility]  :  Assessment & Plan  Mixed hyperlipidemia  Lipid panel reviewed with the patient.  He is eating a heavy carb diet.  I did recommend he cut back on his carbohydrates.  He has decreased his alcohol intake.  He will continue on Crestor.  Repeat lipid panel in 6 months.    Component      Latest Ref Rng 2024   Cholesterol      <200 mg/dL 187    HDL      > OR = 40 mg/dL 46    Triglycerides      <150 mg/dL 278 (H)    LDL Calculated      mg/dL (calc) 101 (H)    Chol/HDL Ratio      <5.0 (calc) 4.1    Non-HDL Cholesterol      <130 mg/dL (calc) 141 (H)       Legend:  (H) High    The 10-year ASCVD risk score (Wendy OLMEDO, et al., 2019) is: 7.4%    Values used to calculate the score:      Age: 49 years      Clincally relevant sex: Male      Is Non- : No      Diabetic: No      Tobacco smoker: Yes      Systolic Blood Pressure: 126 mmHg      Is BP treated: No      HDL Cholesterol: 46 mg/dL      Total Cholesterol: 187 mg/dL        Orders:    rosuvastatin (CRESTOR) 10 MG tablet; Take 1 tablet (10 mg total) by mouth daily    Cigarette nicotine dependence without complication  Patient continues to smoke.  Smoking cessation recommended.  Orders:    CBC    Basic metabolic panel    Ho's esophagus without dysplasia  Patient was following with Dr. Moralez who recently retired.  His last EGD for Ho's was in May 2022.  He is due this year for a 3-year surveillance EGD.  I will refer him to a new gastroenterologist.  Orders:    Ambulatory Referral to Gastroenterology; Future    Cervical radiculopathy  Recent ablation procedure performed.  Pain improved.         Cervical spondylosis  Pain improved with recent ablation         Myofascial pain syndrome  Continues with muscle relaxant as needed         Neural foraminal stenosis  "of cervical spine  Improved with RFA               Depression Screening and Follow-up Plan: Patient was screened for depression during today's encounter. They screened negative with a PHQ-2 score of 1.        History of Present Illness   Patient presents to the office today for follow-up.  He did not get his blood work done prior to this visit and will get it done in the near future.  He continues to follow with [facility] gastroenterologist for his GERD.  He recently had an RFA of C4-C5 with improvement in his symptoms.  He follows with pain management.  Continues on Crestor.  No longer drinking in excess.          Review of Systems   Constitutional: Negative.  Negative for fatigue.   HENT: Negative.  Negative for congestion, postnasal drip, rhinorrhea and trouble swallowing.    Eyes: Negative.  Negative for visual disturbance.   Respiratory: Negative.  Negative for choking and shortness of breath.    Cardiovascular: Negative.  Negative for chest pain.   Gastrointestinal: Negative.    Endocrine: Negative.    Genitourinary: Negative.    Musculoskeletal:  Positive for arthralgias. Negative for back pain, myalgias and neck pain.   Skin: Negative.    Neurological:  Negative for dizziness and headaches.   Psychiatric/Behavioral: Negative.         Objective   /78   Pulse 65   Temp 97.8 °F (36.6 °C) (Tympanic)   Resp 18   Ht 5' 9\" (1.753 m)   Wt 80.2 kg (176 lb 12.8 oz)   SpO2 98%   BMI 26.11 kg/m²      Physical Exam  Vitals reviewed.   Constitutional:       Appearance: Normal appearance.   HENT:      Head: Normocephalic and atraumatic.      Nose: Nose normal.      Mouth/Throat:      Mouth: Mucous membranes are moist.     Eyes:      Extraocular Movements: Extraocular movements intact.      Pupils: Pupils are equal, round, and reactive to light.       Cardiovascular:      Rate and Rhythm: Normal rate and regular rhythm.      Pulses: Normal pulses.      Heart sounds: Normal heart sounds. "   Pulmonary:      Effort: Pulmonary effort is normal.      Breath sounds: Normal breath sounds.     Musculoskeletal:         General: Normal range of motion.     Skin:     General: Skin is warm.     Neurological:      General: No focal deficit present.      Mental Status: He is alert and oriented to person, place, and time. Mental status is at baseline.     Psychiatric:         Mood and Affect: Mood normal.         Behavior: Behavior normal.         Thought Content: Thought content normal.         Judgment: Judgment normal.

## 2025-06-04 ENCOUNTER — OFFICE VISIT (OUTPATIENT)
Dept: FAMILY MEDICINE CLINIC | Facility: CLINIC | Age: 50
End: 2025-06-04
Payer: COMMERCIAL

## 2025-06-04 VITALS
SYSTOLIC BLOOD PRESSURE: 126 MMHG | TEMPERATURE: 97.8 F | DIASTOLIC BLOOD PRESSURE: 78 MMHG | HEART RATE: 65 BPM | BODY MASS INDEX: 26.19 KG/M2 | WEIGHT: 176.8 LBS | OXYGEN SATURATION: 98 % | HEIGHT: 69 IN | RESPIRATION RATE: 18 BRPM

## 2025-06-04 DIAGNOSIS — F17.210 CIGARETTE NICOTINE DEPENDENCE WITHOUT COMPLICATION: ICD-10-CM

## 2025-06-04 DIAGNOSIS — E78.2 MIXED HYPERLIPIDEMIA: Primary | ICD-10-CM

## 2025-06-04 DIAGNOSIS — K22.70 BARRETT'S ESOPHAGUS WITHOUT DYSPLASIA: ICD-10-CM

## 2025-06-04 DIAGNOSIS — M54.12 CERVICAL RADICULOPATHY: ICD-10-CM

## 2025-06-04 DIAGNOSIS — M47.812 CERVICAL SPONDYLOSIS: ICD-10-CM

## 2025-06-04 DIAGNOSIS — M48.02 NEURAL FORAMINAL STENOSIS OF CERVICAL SPINE: ICD-10-CM

## 2025-06-04 DIAGNOSIS — M79.18 MYOFASCIAL PAIN SYNDROME: ICD-10-CM

## 2025-06-04 PROBLEM — R03.0 BLOOD PRESSURE INCREASE DIASTOLIC: Status: RESOLVED | Noted: 2022-02-22 | Resolved: 2025-06-04

## 2025-06-04 LAB
BUN SERPL-MCNC: 18 MG/DL (ref 7–25)
BUN/CREAT SERPL: NORMAL (CALC) (ref 6–22)
CALCIUM SERPL-MCNC: 9.3 MG/DL (ref 8.6–10.3)
CHLORIDE SERPL-SCNC: 105 MMOL/L (ref 98–110)
CHOLEST SERPL-MCNC: 232 MG/DL
CHOLEST/HDLC SERPL: 5 (CALC)
CO2 SERPL-SCNC: 28 MMOL/L (ref 20–32)
CREAT SERPL-MCNC: 0.81 MG/DL (ref 0.7–1.3)
ERYTHROCYTE [DISTWIDTH] IN BLOOD BY AUTOMATED COUNT: 12.6 % (ref 11–15)
GFR/BSA.PRED SERPLBLD CYS-BASED-ARV: 107 ML/MIN/1.73M2
GLUCOSE SERPL-MCNC: 89 MG/DL (ref 65–99)
HCT VFR BLD AUTO: 46.9 % (ref 38.5–50)
HDLC SERPL-MCNC: 46 MG/DL
HGB BLD-MCNC: 15.3 G/DL (ref 13.2–17.1)
LDLC SERPL CALC-MCNC: 135 MG/DL (CALC)
MCH RBC QN AUTO: 30.7 PG (ref 27–33)
MCHC RBC AUTO-ENTMCNC: 32.6 G/DL (ref 32–36)
MCV RBC AUTO: 94.2 FL (ref 80–100)
NONHDLC SERPL-MCNC: 186 MG/DL (CALC)
PLATELET # BLD AUTO: 235 THOUSAND/UL (ref 140–400)
PMV BLD REES-ECKER: 12.1 FL (ref 7.5–12.5)
POTASSIUM SERPL-SCNC: 4.4 MMOL/L (ref 3.5–5.3)
RBC # BLD AUTO: 4.98 MILLION/UL (ref 4.2–5.8)
SODIUM SERPL-SCNC: 140 MMOL/L (ref 135–146)
TRIGL SERPL-MCNC: 358 MG/DL
WBC # BLD AUTO: 8.3 THOUSAND/UL (ref 3.8–10.8)

## 2025-06-04 PROCEDURE — 99214 OFFICE O/P EST MOD 30 MIN: CPT | Performed by: NURSE PRACTITIONER

## 2025-06-04 RX ORDER — ROSUVASTATIN CALCIUM 10 MG/1
10 TABLET, COATED ORAL DAILY
Qty: 90 TABLET | Refills: 1 | Status: SHIPPED | OUTPATIENT
Start: 2025-06-04

## 2025-06-04 NOTE — ASSESSMENT & PLAN NOTE
Patient continues to smoke.  Smoking cessation recommended.  Orders:    CBC    Basic metabolic panel

## 2025-06-04 NOTE — ASSESSMENT & PLAN NOTE
Patient was following with Dr. Moralez who recently retired.  His last EGD for Ho's was in May 2022.  He is due this year for a 3-year surveillance EGD.  I will refer him to a new gastroenterologist.  Orders:    Ambulatory Referral to Gastroenterology; Future

## 2025-06-04 NOTE — ASSESSMENT & PLAN NOTE
Lipid panel reviewed with the patient.  He is eating a heavy carb diet.  I did recommend he cut back on his carbohydrates.  He has decreased his alcohol intake.  He will continue on Crestor.  Repeat lipid panel in 6 months.    Component      Latest Ref Rng 12/4/2024   Cholesterol      <200 mg/dL 187    HDL      > OR = 40 mg/dL 46    Triglycerides      <150 mg/dL 278 (H)    LDL Calculated      mg/dL (calc) 101 (H)    Chol/HDL Ratio      <5.0 (calc) 4.1    Non-HDL Cholesterol      <130 mg/dL (calc) 141 (H)       Legend:  (H) High    The 10-year ASCVD risk score (Wendy OLMEDO, et al., 2019) is: 7.4%    Values used to calculate the score:      Age: 49 years      Clincally relevant sex: Male      Is Non- : No      Diabetic: No      Tobacco smoker: Yes      Systolic Blood Pressure: 126 mmHg      Is BP treated: No      HDL Cholesterol: 46 mg/dL      Total Cholesterol: 187 mg/dL        Orders:    rosuvastatin (CRESTOR) 10 MG tablet; Take 1 tablet (10 mg total) by mouth daily

## 2025-06-19 NOTE — TELEPHONE ENCOUNTER
Upon review of the In Basket request and the patient's chart, initial outreach has been made via fax to facility. Please see Contacts section for details.     Thank you  Anushka Power MA    Patient/Caregiver provided printed discharge information.

## 2025-06-30 NOTE — PROGRESS NOTES
Name: Hardy Calvillo      : 1975      MRN: [id number]  Encounter Provider: STEWART Ashley  Encounter Date: 2025   [facility]  :  Assessment & Plan  Cervical spondylosis         Neck pain         Mid back pain         Myofascial pain syndrome         We can repeat cervical RFA as needed.  I discussed with the patient that since there has been moderate to significant improvement in the pain symptoms, we will hold off on any repeat injections at this point in time. However, I reviewed with the patient that if their symptoms should return or worsen,  they should call our office to schedule to discuss repeating the injection.  Patient may continue methocarbamol as prescribed.  He does not require refills today  Patient may continue Tylenol as needed  Will avoid NSAIDs secondary to GI upset  Discussed trigger point injections for mid back pain however he declines  Continue with home exercise program  Follow-up as needed the pain    My impressions and treatment recommendations were discussed in detail with the patient who verbalized understanding and had no further questions.  Discharge instructions were provided. I personally saw and examined the patient and I agree with the above discussed plan of care.    History of Present Illness     Hardy Calvillo is a 49 y.o. male who presents to [facility] for interval re-evaluation in regards to pain in the Neck and Mid back. The patients last office visit was 2025 .  Patient is status post bilateral C4-6 RFA completed in 2025 with 50% ongoing relief at this time.  He does continue with some mid back pain however it is tolerable.  He uses methocarbamol some 150 mg on a as needed basis and Tylenol as needed with relief.  On the numeric pain scale of 1-10, the pain typically increases to max of 5 out of 10.  He offers no new complaints today    Review of Systems   Respiratory:   "Negative for shortness of breath.    Cardiovascular:  Negative for chest pain.   Gastrointestinal:  Negative for constipation, diarrhea, nausea and vomiting.   Musculoskeletal:  Negative for arthralgias, gait problem, joint swelling and myalgias.   Skin:  Negative for rash.   Neurological:  Negative for dizziness, seizures and weakness.   All other systems reviewed and are negative.      Medical History Reviewed by provider this encounter:     .       Objective   Ht 5' 9\" (1.753 m)   Wt 79.8 kg (176 lb)   BMI 25.99 kg/m²      Pain Score:   5  Physical Exam  Constitutional: normal, well developed, well nourished, alert, in no distress and non-toxic and no overt pain behavior.  Eyes: anicteric  HEENT: grossly intact  Neck: supple, symmetric, trachea midline and no masses   Pulmonary: even and unlabored  Cardiovascular: No edema or pitting edema present  Skin: Normal without rashes or lesions and well hydrated  Psychiatric: Mood and affect appropriate  Neurologic: Cranial Nerves II-XII grossly intact  Musculoskeletal: normal gait  "

## 2025-07-01 ENCOUNTER — OFFICE VISIT (OUTPATIENT)
Dept: PAIN MEDICINE | Facility: CLINIC | Age: 50
End: 2025-07-01
Payer: COMMERCIAL

## 2025-07-01 VITALS — BODY MASS INDEX: 26.07 KG/M2 | WEIGHT: 176 LBS | HEIGHT: 69 IN

## 2025-07-01 DIAGNOSIS — M79.18 MYOFASCIAL PAIN SYNDROME: ICD-10-CM

## 2025-07-01 DIAGNOSIS — M54.2 NECK PAIN: ICD-10-CM

## 2025-07-01 DIAGNOSIS — M54.9 MID BACK PAIN: ICD-10-CM

## 2025-07-01 DIAGNOSIS — M47.812 CERVICAL SPONDYLOSIS: Primary | ICD-10-CM

## 2025-07-01 PROCEDURE — 99214 OFFICE O/P EST MOD 30 MIN: CPT | Performed by: NURSE PRACTITIONER

## 2025-07-30 ENCOUNTER — OFFICE VISIT (OUTPATIENT)
Dept: FAMILY MEDICINE CLINIC | Facility: CLINIC | Age: 50
End: 2025-07-30
Payer: COMMERCIAL

## 2025-07-30 VITALS
SYSTOLIC BLOOD PRESSURE: 138 MMHG | HEIGHT: 69 IN | WEIGHT: 178.4 LBS | BODY MASS INDEX: 26.42 KG/M2 | DIASTOLIC BLOOD PRESSURE: 82 MMHG | OXYGEN SATURATION: 97 % | TEMPERATURE: 98 F | HEART RATE: 72 BPM

## 2025-07-30 DIAGNOSIS — L84 CORNS AND CALLUS: Primary | ICD-10-CM

## 2025-07-30 PROCEDURE — 99213 OFFICE O/P EST LOW 20 MIN: CPT | Performed by: FAMILY MEDICINE

## 2025-07-31 ENCOUNTER — OFFICE VISIT (OUTPATIENT)
Dept: PODIATRY | Facility: CLINIC | Age: 50
End: 2025-07-31
Payer: COMMERCIAL

## 2025-07-31 VITALS — RESPIRATION RATE: 18 BRPM | HEIGHT: 69 IN | BODY MASS INDEX: 26.07 KG/M2 | WEIGHT: 176 LBS

## 2025-07-31 DIAGNOSIS — L84 CORNS AND CALLUS: ICD-10-CM

## 2025-07-31 DIAGNOSIS — M79.671 RIGHT FOOT PAIN: ICD-10-CM

## 2025-07-31 DIAGNOSIS — B07.0 PLANTAR WARTS: Primary | ICD-10-CM

## 2025-07-31 PROCEDURE — 99243 OFF/OP CNSLTJ NEW/EST LOW 30: CPT | Performed by: PODIATRIST

## 2025-07-31 PROCEDURE — 17110 DESTRUCTION B9 LES UP TO 14: CPT | Performed by: PODIATRIST

## 2025-08-12 ENCOUNTER — OFFICE VISIT (OUTPATIENT)
Dept: PODIATRY | Facility: CLINIC | Age: 50
End: 2025-08-12
Payer: COMMERCIAL